# Patient Record
Sex: FEMALE | Race: BLACK OR AFRICAN AMERICAN | Employment: FULL TIME | URBAN - METROPOLITAN AREA
[De-identification: names, ages, dates, MRNs, and addresses within clinical notes are randomized per-mention and may not be internally consistent; named-entity substitution may affect disease eponyms.]

---

## 2017-01-03 ENCOUNTER — OFFICE VISIT (OUTPATIENT)
Dept: OBGYN CLINIC | Age: 42
End: 2017-01-03

## 2017-01-03 VITALS
BODY MASS INDEX: 31.01 KG/M2 | WEIGHT: 175 LBS | SYSTOLIC BLOOD PRESSURE: 136 MMHG | HEART RATE: 78 BPM | HEIGHT: 63 IN | DIASTOLIC BLOOD PRESSURE: 87 MMHG

## 2017-01-03 DIAGNOSIS — D25.1 INTRAMURAL LEIOMYOMA OF UTERUS: Primary | ICD-10-CM

## 2017-01-03 NOTE — MR AVS SNAPSHOT
Visit Information Date & Time Provider Department Dept. Phone Encounter #  
 1/3/2017  2:15 PM Jerome Bone, [facility] [phone] Follow-up Instructions Return in about 1 week (around 1/10/2017) for endometrial biopsy. Your Appointments 2/22/2017  8:20 AM  
[place] [address] [address]) Appt Note: 6 month follow up; Return in 6 months 3rd Hep, breast exam, nurse visit. [address] [facility] 83 [address] [place]  
  
   
 [address] [place] [address] [address] Upcoming Health Maintenance Date Due INFLUENZA AGE 9 TO ADULT 8/1/2016 PAP AKA CERVICAL CYTOLOGY 12/27/2019 Allergies as of 1/3/2017  Review Complete On: 1/3/2017 By: Jerome Bone MD  
 No Known Allergies Current Immunizations  Reviewed on 8/16/2016 Name Date Hep B Vaccine (Adult) 8/22/2016, 7/22/2016 MMR 8/16/2016 TB Skin Test (PPD) Intradermal 7/22/2016 Tdap 7/22/2016 Not reviewed this visit Vitals BP Pulse Height(growth percentile) Weight(growth percentile) LMP BMI  
 136/87 78 5' 3\" (1.6 m) 175 lb (79.4 kg) 12/11/2016 (Exact Date) 31 kg/m2 OB Status Smoking Status Having regular periods Never Smoker BMI and BSA Data Body Mass Index Body Surface Area  
 31 kg/m 2 1.88 m 2 Preferred Pharmacy Pharmacy Name Phone [facility] [address], [address] [place] [address] [phone] Your Updated Medication List  
  
   
This list is accurate as of: 1/3/17  3:58 PM.  Always use your most recent med list.  
  
  
  
  
 multivitamin with iron tablet Take 1 Tab by mouth daily. Follow-up Instructions Return in about 1 week (around 1/10/2017) for endometrial biopsy. Patient Instructions Endometrial Biopsy: About This Test 
What is it? An endometrial biopsy is a way for your doctor to take a small sample of the lining of the uterus (endometrium). The sample is looked at under a microscope for abnormal cells. An endometrial biopsy helps your doctor find problems in the endometrium. Why is this test done? An endometrial biopsy is done to check for cancer of the uterus. The test is also done if you have abnormal bleeding from your uterus or are having problems getting pregnant. The test results show how your body's hormones are affecting the lining of the uterus. How can you prepare for the test? 
Talk to your doctor about all your health conditions before the test. For example, tell your doctor if you: · Are or might be pregnant. An endometrial biopsy is not done during pregnancy. · Are taking any medicines. · Are allergic to any medicines. · Have had bleeding problems, or if you take aspirin or some other blood thinner. · Have been treated for an infection in your pelvic area. · Have any heart or lung problems. Other ways to prepare: · Do not douche, use tampons, or use vaginal medicines for 24 hours before the test. 
· Ask your doctor if you should take a pain reliever, such as ibuprofen (Advil or Motrin), 30 to 60 minutes before the test. This can help reduce any cramping pain that the test can cause. · Talk to your doctor if you have concerns about the need for the test, its risks, how it will be done, or what the results may mean. What happens before the test? 
· You will empty your bladder just before the test. 
· You will be asked to sign a consent form that says you understand the risks of the test and agree to have it done. What happens during the test? 
· You will lie on an exam table. Your feet will be in stirrups. · The doctor may use a spray or injection to numb your cervix. The cervix is the opening to the uterus. · The doctor will use a tool called a speculum to see the cervix. · Then the doctor will pass a thin tube through the cervix to take a sample of the uterus lining. You may feel a sharp cramp when the doctor collects the sample. · The sample is sent to a lab. How long does the test take? The test will take about 5 to 15 minutes. What happens after the test? 
· You will probably be able to go home right away. · You likely will have mild vaginal bleeding and may have cramps for a few days after the test. The cramps may feel like bad menstrual cramps. · Ask your doctor if you can take an over-the-counter pain medicine, such as acetaminophen (Tylenol), ibuprofen (Advil, Motrin), or naproxen (Aleve). Be safe with medicines. Read and follow all instructions on the label. · Do not have sex, use tampons, or douche until the spotting stops. Use pads for vaginal bleeding or discharge. · Do not do strenuous exercise or heavy lifting for one day after your biopsy. Follow-up care is a key part of your treatment and safety. Be sure to make and go to all appointments, and call your doctor if you are having problems. It's also a good idea to keep a list of the medicines you take. Ask your doctor when you can expect to have your test results. Where can you learn more? Go to http://cesia-spenser.info/. Enter [phone] in the search box to learn more about \"Endometrial Biopsy: About This Test.\" Current as of: May 2, 2016 Content Version: 11.1 © 2525-5815 [employer]. Care instructions adapted under license by Sustainatopia.[employer] (which disclaims liability or warranty for this information). If you have questions about a medical condition or this instruction, always ask your healthcare professional. [address] any warranty or liability for your use of this information. Introducing [facility] HEALTH SERVICES! Dear April: 
Thank you for requesting a Formotus account.   Our records indicate that you already have an active ExactCost account. You can access your account anytime at [email or web address] Did you know that you can access your hospital and ER discharge instructions at any time in ExactCost? You can also review all of your test results from your hospital stay or ER visit. Additional Information If you have questions, please visit the Frequently Asked Questions section of the ExactCost website at [email or web address] Remember, ExactCost is NOT to be used for urgent needs. For medical emergencies, dial 911. Now available from your iPhone and Android! Please provide this summary of care documentation to your next provider. Your primary care clinician is listed as Karin Esquivel. If you have any questions after today's visit, please call [phone].

## 2017-01-03 NOTE — PATIENT INSTRUCTIONS
Endometrial Biopsy: About This Test  What is it? An endometrial biopsy is a way for your doctor to take a small sample of the lining of the uterus (endometrium). The sample is looked at under a microscope for abnormal cells. An endometrial biopsy helps your doctor find problems in the endometrium. Why is this test done? An endometrial biopsy is done to check for cancer of the uterus. The test is also done if you have abnormal bleeding from your uterus or are having problems getting pregnant. The test results show how your body's hormones are affecting the lining of the uterus. How can you prepare for the test?  Talk to your doctor about all your health conditions before the test. For example, tell your doctor if you:  · Are or might be pregnant. An endometrial biopsy is not done during pregnancy. · Are taking any medicines. · Are allergic to any medicines. · Have had bleeding problems, or if you take aspirin or some other blood thinner. · Have been treated for an infection in your pelvic area. · Have any heart or lung problems. Other ways to prepare:  · Do not douche, use tampons, or use vaginal medicines for 24 hours before the test.  · Ask your doctor if you should take a pain reliever, such as ibuprofen (Advil or Motrin), 30 to 60 minutes before the test. This can help reduce any cramping pain that the test can cause. · Talk to your doctor if you have concerns about the need for the test, its risks, how it will be done, or what the results may mean. What happens before the test?  · You will empty your bladder just before the test.  · You will be asked to sign a consent form that says you understand the risks of the test and agree to have it done. What happens during the test?  · You will lie on an exam table. Your feet will be in stirrups. · The doctor may use a spray or injection to numb your cervix. The cervix is the opening to the uterus.   · The doctor will use a tool called a speculum to see the cervix. · Then the doctor will pass a thin tube through the cervix to take a sample of the uterus lining. You may feel a sharp cramp when the doctor collects the sample. · The sample is sent to a lab. How long does the test take? The test will take about 5 to 15 minutes. What happens after the test?  · You will probably be able to go home right away. · You likely will have mild vaginal bleeding and may have cramps for a few days after the test. The cramps may feel like bad menstrual cramps. · Ask your doctor if you can take an over-the-counter pain medicine, such as acetaminophen (Tylenol), ibuprofen (Advil, Motrin), or naproxen (Aleve). Be safe with medicines. Read and follow all instructions on the label. · Do not have sex, use tampons, or douche until the spotting stops. Use pads for vaginal bleeding or discharge. · Do not do strenuous exercise or heavy lifting for one day after your biopsy. Follow-up care is a key part of your treatment and safety. Be sure to make and go to all appointments, and call your doctor if you are having problems. It's also a good idea to keep a list of the medicines you take. Ask your doctor when you can expect to have your test results. Where can you learn more? Go to http://cesai-spenser.info/. Enter [phone] in the search box to learn more about \"Endometrial Biopsy: About This Test.\"  Current as of: May 2, 2016  Content Version: 11.1  © 4189-5816 Foodini, Incorporated. Care instructions adapted under license by OwnEnergy (which disclaims liability or warranty for this information). If you have questions about a medical condition or this instruction, always ask your healthcare professional. [address] any warranty or liability for your use of this information.

## 2017-01-03 NOTE — PROGRESS NOTES
40 y/o  presents to the office for results. She has abnormal bleeding with known fibroids. She describes the bleeding as heavy and she soaks pads hourly. She desires definitive management. Visit Vitals    /87    Pulse 78    Ht 5' 3\" (1.6 m)    Wt 175 lb (79.4 kg)    LMP 2016 (Exact Date)    BMI 31 kg/m2     Exam: deferred. Labs: pap normal.    Ultrasound: Findings: A real time sonographic examination of the pelvis was performed. Both  transabdominal and endovaginal images were obtained. The uterus is normal in  size. The uterus measured 12.7 x 6.1 x 7.2 cm. Two uterine masses are  measured. One measures 3.2 x 3.0 x 3.3 cm. The other measures 4.0 x 3.0 x 3.3  cm. Masses are characteristic of uterine leiomyoma. The endometrial stripe was  normal in thickness and measured 11 mm in double wall thickness. The right  ovary has been previously removed. The left ovary measured 3.8 x 4.0 x 2.1 cm. Doppler flow is demonstrated to the left ovary. There was a 1.6 cm follicular  cyst demonstrated. No adnexal masses or fluid collections were present. A/P:  Symptomatic uterine fibroids. Desires surgical managment. Will not need clearance. EMB at next visit. RTO 1 week or not on her cycle.

## 2017-01-17 ENCOUNTER — OFFICE VISIT (OUTPATIENT)
Dept: OBGYN CLINIC | Age: 42
End: 2017-01-17

## 2017-01-17 VITALS
HEIGHT: 63 IN | WEIGHT: 175 LBS | BODY MASS INDEX: 31.01 KG/M2 | DIASTOLIC BLOOD PRESSURE: 76 MMHG | HEART RATE: 84 BPM | SYSTOLIC BLOOD PRESSURE: 123 MMHG

## 2017-01-17 DIAGNOSIS — D25.9 UTERINE LEIOMYOMA, UNSPECIFIED LOCATION: ICD-10-CM

## 2017-01-17 DIAGNOSIS — N92.1 MENORRHAGIA WITH IRREGULAR CYCLE: Primary | ICD-10-CM

## 2017-01-17 NOTE — PROCEDURES
Pre-procedure   Pain: 0:10    Time out: 0940    Endometrial Biopsy Procedure Note    Endometrial biopsy was performed today. Indications: enlarged uterus    Procedure Details   Urine pregnancy test was done and result is negative. The risks (including infection, bleeding, pain, and uterine perforation) and benefits of the procedure were explained to the her and written informed consent was obtained. Antibiotic prophylaxis against endocarditis was not indicated. She was placed in the dorsal lithotomy position. Bimanual exam showed the uterus to be in the anteroflexed position. A speculum inserted in the vagina, and the cervix prepped with povidone iodine. A \"Pipelle endometrial aspirator\" was used to sample the endometrium. Sample was sent for pathologic examination. The patient tolerated the procedure well and there were no complications. Post procedure pain: 5:10    Plan:  Ms. Allison Martínez was advised to call for any fever or for prolonged or severe pain or bleeding. She was advised to use OTC ibuprofen as needed for mild to moderate pain. She was advised to avoid vaginal intercourse for 48 hours or until the bleeding has completely stopped. My office will get in touch with her as soon as we have the pathology report.

## 2017-01-17 NOTE — MR AVS SNAPSHOT
Visit Information Date & Time Provider Department Dept. Phone Encounter #  
 1/17/2017  9:15 AM [phone] [id number] Follow-up Instructions Return in about 8 days (around 1/25/2017) for pre-op exam.  
  
Your Appointments 1/25/2017 10:30 AM  
PRE OP CPE with Rex Deshpande MD  
[address] [place] [address] ([facility]) Appt Note: pre-op [facility] 83 [phone]  
[phone]  
  
   
 [facility] [phone]  
  
    
 2/16/2017 10:40 AM  
Follow Up with Eliel Jimenez DO [address] [place] [address] Appt Note: 6 month follow up; Return in 6 months 3rd Hep, breast exam, nurse visit. ; LM w/ lady to call office to r/s 1/11 de; r/s from 2/22 de  
 [address] [facility] [address] [facility] 83 222 [facility]  
  
   
 [address] [facility] [address] Upcoming Health Maintenance Date Due INFLUENZA AGE 9 TO ADULT 8/1/2016 PAP AKA CERVICAL CYTOLOGY 12/27/2019 Allergies as of 1/17/2017  Review Complete On: 1/3/2017 By: Rex Deshpande MD  
 No Known Allergies Current Immunizations  Reviewed on 8/16/2016 Name Date Hep B Vaccine (Adult) 8/22/2016, 7/22/2016 MMR 8/16/2016 TB Skin Test (PPD) Intradermal 7/22/2016 Tdap 7/22/2016 Not reviewed this visit You Were Diagnosed With   
  
 Codes Comments Menorrhagia with irregular cycle    -  Primary ICD-10-CM: N92.1 ICD-9-CM: 626.2 Uterine leiomyoma, unspecified location     ICD-10-CM: D25.9 ICD-9-CM: 218.9 Vitals BP Pulse Height(growth percentile) Weight(growth percentile) LMP BMI  
 123/76 84 5' 3\" (1.6 m) 175 lb (79.4 kg) 12/11/2016 (Exact Date) 31 kg/m2 OB Status Smoking Status Having regular periods Never Smoker BMI and BSA Data Body Mass Index Body Surface Area  
 31 kg/m 2 1.88 m 2 Preferred Pharmacy Pharmacy Name Phone [facility] [address], [place] [phone] Your Updated Medication List  
  
   
This list is accurate as of: 1/17/17 10:09 AM.  Always use your most recent med list.  
  
  
  
  
 multivitamin with iron tablet Take 1 Tab by mouth daily. Follow-up Instructions Return in about 8 days (around 1/25/2017) for pre-op exam.  
  
  
Patient Instructions Laparoscopically Assisted Vaginal Hysterectomy: Before Your Surgery What is a laparoscopically assisted vaginal hysterectomy? A hysterectomy is surgery to take out the uterus. In some cases, the ovaries and fallopian tubes are taken out at the same time. The doctor makes one or more small cuts in the belly. These cuts are called incisions. They let the doctor insert tools to do the surgery. One of these tools is a tube with a light on it. It's called a laparoscope, or scope. The scope and the other tools allow the doctor to free the uterus. Then the doctor makes a small cut in the vagina. The uterus is taken out through this cut. After the surgery, you will not have periods. You will not be able to get pregnant. If there is a chance that you want to have a baby, talk to your doctor about treatment options. Some women go home the day of surgery and some will stay in the hospital 1 to 2 days after surgery. You will need about 4 to 6 weeks to fully recover. The recovery time may be less for some patients. Follow-up care is a key part of your treatment and safety. Be sure to make and go to all appointments, and call your doctor if you are having problems. It's also a good idea to know your test results and keep a list of the medicines you take. What happens before surgery? Surgery can be stressful. This information will help you understand what you can expect. And it will help you safely prepare for surgery. Preparing for surgery · Understand exactly what surgery is planned, along with the risks, benefits, and other options. · Tell your doctors ALL the medicines, vitamins, supplements, and herbal remedies you take. Some of these can increase the risk of bleeding or interact with anesthesia. · If you take blood thinners, such as warfarin (Coumadin), clopidogrel (Plavix), or aspirin, be sure to talk to your doctor. He or she will tell you if you should stop taking these medicines before your surgery. Make sure that you understand exactly what your doctor wants you to do. · Your doctor will tell you which medicines to take or stop before your surgery. You may need to stop taking certain medicines a week or more before surgery. So talk to your doctor as soon as you can. · If you have an advance directive, let your doctor know. It may include a living will and a durable power of  for health care. Bring a copy to the hospital. If you don't have one, you may want to prepare one. It lets your doctor and loved ones know your health care wishes. Doctors advise that everyone prepare these papers before any type of surgery or procedure. What happens on the day of surgery? · Follow the instructions exactly about when to stop eating and drinking. If you don't, your surgery may be canceled. If your doctor told you to take your medicines on the day of surgery, please take them with only a sip of water. · Take a bath or shower before you come in for your surgery. Do not apply lotions, perfumes, deodorants, or nail polish. · Do not shave the surgical site yourself. · Take off all jewelry and piercings. And take out contact lenses, if you wear them. At the hospital or surgery center · Bring a picture ID. · You will be kept comfortable and safe by your anesthesia provider. You will be asleep during the surgery. · The surgery will take about 2 to 4 hours. Going home · Be sure you have someone to drive you home.  Anesthesia and pain medicine make it unsafe for you to drive. · You will be given more specific instructions about recovering from your surgery. They will cover things like diet, wound care, follow-up care, driving, and getting back to your normal routine. When should you call your doctor? · You have questions or concerns. · You do not understand how to prepare for your surgery. · You become ill before surgery (such as fever, flu, or a cold). · You need to reschedule or have changed your mind about having the surgery. Where can you learn more? Go to http://cesia-spenser.info/. Enter D669 in the search box to learn more about \"Laparoscopically Assisted Vaginal Hysterectomy: Before Your Surgery. \" Current as of: February 25, 2016 Content Version: 11.1 © 8415-3378 Healthwise, Incorporated. Care instructions adapted under license by [employer]tan (which disclaims liability or warranty for this information). If you have questions about a medical condition or this instruction, always ask your healthcare professional. [place] [address] any warranty or liability for your use of this information. Introducing [facility] & HEALTH SERVICES! Dear April: 
Thank you for requesting a GetMaid account. Our records indicate that you already have an active GetMaid account. You can access your account anytime at [email or web address] "[email or web address] [email or web address] Medical Did you know that you can access your hospital and ER discharge instructions at any time in GetMaid? You can also review all of your test results from your hospital stay or ER visit. Additional Information If you have questions, please visit the Frequently Asked Questions section of the GetMaid website at [email or web address] Remember, GetMaid is NOT to be used for urgent needs. For medical emergencies, dial 911. Now available from your iPhone and Android! Please provide this summary of care documentation to your next provider. Your primary care clinician is listed as Jeremie Sheikh. If you have any questions after today's visit, please call [phone].

## 2017-01-17 NOTE — PATIENT INSTRUCTIONS
Laparoscopically Assisted Vaginal Hysterectomy: Before Your Surgery  What is a laparoscopically assisted vaginal hysterectomy? A hysterectomy is surgery to take out the uterus. In some cases, the ovaries and fallopian tubes are taken out at the same time. The doctor makes one or more small cuts in the belly. These cuts are called incisions. They let the doctor insert tools to do the surgery. One of these tools is a tube with a light on it. It's called a laparoscope, or scope. The scope and the other tools allow the doctor to free the uterus. Then the doctor makes a small cut in the vagina. The uterus is taken out through this cut. After the surgery, you will not have periods. You will not be able to get pregnant. If there is a chance that you want to have a baby, talk to your doctor about treatment options. Some women go home the day of surgery and some will stay in the hospital 1 to 2 days after surgery. You will need about 4 to 6 weeks to fully recover. The recovery time may be less for some patients. Follow-up care is a key part of your treatment and safety. Be sure to make and go to all appointments, and call your doctor if you are having problems. It's also a good idea to know your test results and keep a list of the medicines you take. What happens before surgery? Surgery can be stressful. This information will help you understand what you can expect. And it will help you safely prepare for surgery. Preparing for surgery  · Understand exactly what surgery is planned, along with the risks, benefits, and other options. · Tell your doctors ALL the medicines, vitamins, supplements, and herbal remedies you take. Some of these can increase the risk of bleeding or interact with anesthesia. · If you take blood thinners, such as warfarin (Coumadin), clopidogrel (Plavix), or aspirin, be sure to talk to your doctor. He or she will tell you if you should stop taking these medicines before your surgery. Make sure that you understand exactly what your doctor wants you to do. · Your doctor will tell you which medicines to take or stop before your surgery. You may need to stop taking certain medicines a week or more before surgery. So talk to your doctor as soon as you can. · If you have an advance directive, let your doctor know. It may include a living will and a durable power of  for health care. Bring a copy to the hospital. If you don't have one, you may want to prepare one. It lets your doctor and loved ones know your health care wishes. Doctors advise that everyone prepare these papers before any type of surgery or procedure. What happens on the day of surgery? · Follow the instructions exactly about when to stop eating and drinking. If you don't, your surgery may be canceled. If your doctor told you to take your medicines on the day of surgery, please take them with only a sip of water. · Take a bath or shower before you come in for your surgery. Do not apply lotions, perfumes, deodorants, or nail polish. · Do not shave the surgical site yourself. · Take off all jewelry and piercings. And take out contact lenses, if you wear them. At the hospital or surgery center  · Bring a picture ID. · You will be kept comfortable and safe by your anesthesia provider. You will be asleep during the surgery. · The surgery will take about 2 to 4 hours. Going home  · Be sure you have someone to drive you home. Anesthesia and pain medicine make it unsafe for you to drive. · You will be given more specific instructions about recovering from your surgery. They will cover things like diet, wound care, follow-up care, driving, and getting back to your normal routine. When should you call your doctor? · You have questions or concerns. · You do not understand how to prepare for your surgery. · You become ill before surgery (such as fever, flu, or a cold).   · You need to reschedule or have changed your mind about having the surgery. Where can you learn more? Go to http://cesia-spenser.info/. Enter D669 in the search box to learn more about \"Laparoscopically Assisted Vaginal Hysterectomy: Before Your Surgery. \"  Current as of: February 25, 2016  Content Version: 11.1  © 2612-2543 [employer], [employer]. Care instructions adapted under license by [employer] (which disclaims liability or warranty for this information). If you have questions about a medical condition or this instruction, always ask your healthcare professional. [address] any warranty or liability for your use of this information.

## 2017-01-17 NOTE — PROGRESS NOTES
42 y/o  presents to the office for EMB. She has symptomatic fibroids and is being evaluated for hysterectomy. Her cycle was -12. She has no complaints at this time. Visit Vitals    /76    Pulse 84    Ht 5' 3\" (1.6 m)    Wt 175 lb (79.4 kg)    LMP 2016 (Exact Date)    BMI 31 kg/m2     See full procedure note for details of EMB. A/P:  EMB done today. Surgery scheduled for 17.

## 2017-01-19 LAB
DX ICD CODE: NORMAL
PATH REPORT.FINAL DX SPEC: NORMAL
PATH REPORT.GROSS SPEC: NORMAL
PATH REPORT.SITE OF ORIGIN SPEC: NORMAL
PATHOLOGIST NAME: NORMAL
PAYMENT PROCEDURE: NORMAL

## 2017-01-20 ENCOUNTER — OFFICE VISIT (OUTPATIENT)
Dept: OBGYN CLINIC | Age: 42
End: 2017-01-20

## 2017-01-25 ENCOUNTER — OFFICE VISIT (OUTPATIENT)
Dept: OBGYN CLINIC | Age: 42
End: 2017-01-25

## 2017-01-25 VITALS — HEIGHT: 63 IN

## 2017-01-25 DIAGNOSIS — D25.1 INTRAMURAL LEIOMYOMA OF UTERUS: Primary | ICD-10-CM

## 2017-01-25 DIAGNOSIS — N92.0 MENORRHAGIA WITH REGULAR CYCLE: ICD-10-CM

## 2017-01-25 NOTE — PROGRESS NOTES
Preoperative Evaluation    Date of Exam: 2017 FRANSISCA Prado is a 43 y.o. female (:1975) who presents for preoperative evaluation. Procedure/Surgery:Davinci Laparoscopic Hysterectomy/Salpingectomy; Ovarian preservation  Date of Procedure/Surgery: 2017  Surgeon: An Marrero MD  Hospital/Surgical Facility: [facility]  Primary Physician: Kyle Moeller DO  Latex Allergy: no    Problem List:     Patient Active Problem List    Diagnosis Date Noted    Fibroids 2016    H/O excision of dermoid cyst 2016    Anemia 10/13/2014    Menorrhagia 10/03/2014     Medical History:     Past Medical History   Diagnosis Date    Anemia 10/13/2014    Breast lump 08/15/2016     Right braest lump 2 oclock per MD    Fibroids 2016    H/O excision of dermoid cyst 2016     Allergies:   No Known Allergies   Medications:     Current Outpatient Prescriptions   Medication Sig    multivitamin with iron tablet Take 1 Tab by mouth daily. No current facility-administered medications for this visit. Surgical History:     Past Surgical History   Procedure Laterality Date    Hx cyst removal       right ovary    Hx  section  2001    Hx tubal ligation  2001     Social History:     Social History     Social History    Marital status: SINGLE     Spouse name: N/A    Number of children: N/A    Years of education: N/A     Social History Main Topics    Smoking status: Never Smoker    Smokeless tobacco: Never Used    Alcohol use Yes      Comment: occ once a month.      Drug use: No    Sexual activity: Yes     Partners: Male     Birth control/ protection: None     Other Topics Concern    Not on file     Social History Narrative       Recent use of: No recent use of aspirin (ASA), NSAIDS or steroids    Tetanus up to date: tetanus status unknown to the patient      Anesthesia Complications: None  History of abnormal bleeding : None  History of Blood Transfusions: no  Health Care Directive or Living Will: no    REVIEW OF SYSTEMS:  A comprehensive review of systems was negative except for that written in the HPI. EXAM:   Visit Vitals    Ht 5' 3\" (1.6 m)     General appearance - alert, well appearing, and in no distress and normal appearing weight  Mental status - alert, oriented to person, place, and time  Chest - clear to auscultation, no wheezes, rales or rhonchi, symmetric air entry  Heart - normal rate, regular rhythm, normal S1, S2, no murmurs, rubs, clicks or gallops  Abdomen - soft, nontender, nondistended, no masses or organomegaly  Pelvic - 12-14 week sized uterus. Mobile      DIAGNOSTICS:   1. EKG: EKG FINDINGS - pending  2. CXR: not indicated  3. Labs: pending     Ultrasound: 12 x 8x 6 cm multi-fibroid uterus. IMPRESSION:   44 y/o with symptomatic uterine fibroids. Desires definitive management. Pt. Doesn't have any known chronic health problems- labs are pending with EKG. Risks, benefits, and alternatives to this procedure were explained in detail, including risks of infection, damage to surrounding structures, and bleeding. No contraindications to planned surgery pending normal labs.     Jf Giron MD   1/25/2017

## 2017-01-27 ENCOUNTER — HOSPITAL ENCOUNTER (OUTPATIENT)
Dept: PREADMISSION TESTING | Age: 42
Discharge: HOME | End: 2017-01-27
Payer: COMMERCIAL

## 2017-01-27 ENCOUNTER — HOSPITAL ENCOUNTER (OUTPATIENT)
Dept: LAB | Age: 42
Discharge: HOME | End: 2017-01-27
Payer: COMMERCIAL

## 2017-01-27 ENCOUNTER — ANESTHESIA EVENT (OUTPATIENT)
Dept: SURGERY | Age: 42
End: 2017-01-27
Payer: COMMERCIAL

## 2017-01-27 DIAGNOSIS — N92.0 EXCESSIVE OR FREQUENT MENSTRUATION: ICD-10-CM

## 2017-01-27 DIAGNOSIS — D25.9 LEIOMYOMA OF UTERUS, UNSPECIFIED: ICD-10-CM

## 2017-01-27 DIAGNOSIS — Z01.818 PRE-OP TESTING: ICD-10-CM

## 2017-01-27 DIAGNOSIS — D64.9 ANEMIA, UNSPECIFIED: ICD-10-CM

## 2017-01-27 LAB
ALBUMIN SERPL BCP-MCNC: 3.7 G/DL (ref 3.4–5)
ALBUMIN/GLOB SERPL: 0.9 {RATIO} (ref 0.8–1.7)
ALP SERPL-CCNC: 82 U/L (ref 45–117)
ALT SERPL-CCNC: 56 U/L (ref 13–56)
ANION GAP BLD CALC-SCNC: 8 MMOL/L (ref 3–18)
AST SERPL W P-5'-P-CCNC: 17 U/L (ref 15–37)
BILIRUB SERPL-MCNC: 0.2 MG/DL (ref 0.2–1)
BUN SERPL-MCNC: 9 MG/DL (ref 7–18)
BUN/CREAT SERPL: 10 (ref 12–20)
CALCIUM SERPL-MCNC: 8.7 MG/DL (ref 8.5–10.1)
CHLORIDE SERPL-SCNC: 102 MMOL/L (ref 100–108)
CO2 SERPL-SCNC: 28 MMOL/L (ref 21–32)
CREAT SERPL-MCNC: 0.87 MG/DL (ref 0.6–1.3)
ERYTHROCYTE [DISTWIDTH] IN BLOOD BY AUTOMATED COUNT: 13.8 % (ref 11.6–14.5)
GLOBULIN SER CALC-MCNC: 3.9 G/DL (ref 2–4)
GLUCOSE SERPL-MCNC: 110 MG/DL (ref 74–99)
HBA1C MFR BLD: 5.2 % (ref 4.2–5.6)
HCG SERPL-ACNC: <2 MIU/ML (ref 0–10)
HCT VFR BLD AUTO: 38.2 % (ref 35–45)
HGB BLD-MCNC: 12.2 G/DL (ref 12–16)
MCH RBC QN AUTO: 29 PG (ref 24–34)
MCHC RBC AUTO-ENTMCNC: 31.9 G/DL (ref 31–37)
MCV RBC AUTO: 91 FL (ref 74–97)
PLATELET # BLD AUTO: 324 K/UL (ref 135–420)
PMV BLD AUTO: 10.2 FL (ref 9.2–11.8)
POTASSIUM SERPL-SCNC: 4.1 MMOL/L (ref 3.5–5.5)
PROT SERPL-MCNC: 7.6 G/DL (ref 6.4–8.2)
RBC # BLD AUTO: 4.2 M/UL (ref 4.2–5.3)
SODIUM SERPL-SCNC: 138 MMOL/L (ref 136–145)
WBC # BLD AUTO: 5.9 K/UL (ref 4.6–13.2)

## 2017-01-27 PROCEDURE — 83036 HEMOGLOBIN GLYCOSYLATED A1C: CPT | Performed by: OBSTETRICS & GYNECOLOGY

## 2017-01-27 PROCEDURE — 80053 COMPREHEN METABOLIC PANEL: CPT | Performed by: OBSTETRICS & GYNECOLOGY

## 2017-01-27 PROCEDURE — 93005 ELECTROCARDIOGRAM TRACING: CPT

## 2017-01-27 PROCEDURE — 36415 COLL VENOUS BLD VENIPUNCTURE: CPT | Performed by: OBSTETRICS & GYNECOLOGY

## 2017-01-27 PROCEDURE — 84702 CHORIONIC GONADOTROPIN TEST: CPT | Performed by: OBSTETRICS & GYNECOLOGY

## 2017-01-27 PROCEDURE — 85027 COMPLETE CBC AUTOMATED: CPT | Performed by: OBSTETRICS & GYNECOLOGY

## 2017-01-28 LAB
ATRIAL RATE: 73 BPM
CALCULATED P AXIS, ECG09: 17 DEGREES
CALCULATED R AXIS, ECG10: 65 DEGREES
CALCULATED T AXIS, ECG11: 48 DEGREES
DIAGNOSIS, 93000: NORMAL
P-R INTERVAL, ECG05: 128 MS
Q-T INTERVAL, ECG07: 366 MS
QRS DURATION, ECG06: 84 MS
QTC CALCULATION (BEZET), ECG08: 403 MS
VENTRICULAR RATE, ECG03: 73 BPM

## 2017-01-30 ENCOUNTER — HOSPITAL ENCOUNTER (OUTPATIENT)
Age: 42
Discharge: HOME | End: 2017-01-31
Attending: OBSTETRICS & GYNECOLOGY | Admitting: OBSTETRICS & GYNECOLOGY
Payer: COMMERCIAL

## 2017-01-30 ENCOUNTER — ANESTHESIA (OUTPATIENT)
Dept: SURGERY | Age: 42
End: 2017-01-30
Payer: COMMERCIAL

## 2017-01-30 LAB
ABO + RH BLD: NORMAL
BLOOD GROUP ANTIBODIES SERPL: NORMAL
HCG UR QL: NEGATIVE
SPECIMEN EXP DATE BLD: NORMAL

## 2017-01-30 PROCEDURE — 77030010517 HC APPL SEAL FLOSEL BAXT -B: Performed by: OBSTETRICS & GYNECOLOGY

## 2017-01-30 PROCEDURE — 76210000017 HC OR PH I REC 1.5 TO 2 HR: Performed by: OBSTETRICS & GYNECOLOGY

## 2017-01-30 PROCEDURE — 77030016151 HC PROTCTR LNS DFOG COVD -B: Performed by: OBSTETRICS & GYNECOLOGY

## 2017-01-30 PROCEDURE — 77030005520 HC CATH URETH FOL38 BARD -A: Performed by: OBSTETRICS & GYNECOLOGY

## 2017-01-30 PROCEDURE — 77030028268: Performed by: OBSTETRICS & GYNECOLOGY

## 2017-01-30 PROCEDURE — 77030014647 HC SEAL FBRN TISSL BAXT -D: Performed by: OBSTETRICS & GYNECOLOGY

## 2017-01-30 PROCEDURE — 88307 TISSUE EXAM BY PATHOLOGIST: CPT | Performed by: OBSTETRICS & GYNECOLOGY

## 2017-01-30 PROCEDURE — 88311 DECALCIFY TISSUE: CPT | Performed by: OBSTETRICS & GYNECOLOGY

## 2017-01-30 PROCEDURE — 76010000878 HC OR TIME 3 TO 3.5HR INTENSV - TIER 2: Performed by: OBSTETRICS & GYNECOLOGY

## 2017-01-30 PROCEDURE — 77030013079 HC BLNKT BAIR HGGR 3M -A: Performed by: ANESTHESIOLOGY

## 2017-01-30 PROCEDURE — 77030020255 HC SOL INJ LR 1000ML BG

## 2017-01-30 PROCEDURE — 74011250636 HC RX REV CODE- 250/636: Performed by: OBSTETRICS & GYNECOLOGY

## 2017-01-30 PROCEDURE — 77030034850: Performed by: OBSTETRICS & GYNECOLOGY

## 2017-01-30 PROCEDURE — 74011250636 HC RX REV CODE- 250/636

## 2017-01-30 PROCEDURE — 74011250636 HC RX REV CODE- 250/636: Performed by: NURSE ANESTHETIST, CERTIFIED REGISTERED

## 2017-01-30 PROCEDURE — 77030010545: Performed by: OBSTETRICS & GYNECOLOGY

## 2017-01-30 PROCEDURE — 77030018842 HC SOL IRR SOD CL 9% BAXT -A: Performed by: OBSTETRICS & GYNECOLOGY

## 2017-01-30 PROCEDURE — 86900 BLOOD TYPING SEROLOGIC ABO: CPT | Performed by: NURSE ANESTHETIST, CERTIFIED REGISTERED

## 2017-01-30 PROCEDURE — 77030035048 HC TRCR ENDOSC OPTCL COVD -B: Performed by: OBSTETRICS & GYNECOLOGY

## 2017-01-30 PROCEDURE — 77030021678 HC GLIDESCP STAT DISP VERT -B: Performed by: ANESTHESIOLOGY

## 2017-01-30 PROCEDURE — 77030029357 HC DEV CLSR FAC SYS EFX TELE -C: Performed by: OBSTETRICS & GYNECOLOGY

## 2017-01-30 PROCEDURE — 74011250637 HC RX REV CODE- 250/637: Performed by: NURSE ANESTHETIST, CERTIFIED REGISTERED

## 2017-01-30 PROCEDURE — C1727 CATH, BAL TIS DIS, NON-VAS: HCPCS | Performed by: OBSTETRICS & GYNECOLOGY

## 2017-01-30 PROCEDURE — 36415 COLL VENOUS BLD VENIPUNCTURE: CPT | Performed by: NURSE ANESTHETIST, CERTIFIED REGISTERED

## 2017-01-30 PROCEDURE — 77030035277 HC OBTRTR BLDELSS DISP INTU -B: Performed by: OBSTETRICS & GYNECOLOGY

## 2017-01-30 PROCEDURE — 77030032490 HC SLV COMPR SCD KNE COVD -B: Performed by: OBSTETRICS & GYNECOLOGY

## 2017-01-30 PROCEDURE — 77030002986 HC SUT PROL J&J -A: Performed by: OBSTETRICS & GYNECOLOGY

## 2017-01-30 PROCEDURE — 77030008518 HC TBNG INSUF ENDO STRY -B: Performed by: OBSTETRICS & GYNECOLOGY

## 2017-01-30 PROCEDURE — 77030008683 HC TU ET CUF COVD -A: Performed by: ANESTHESIOLOGY

## 2017-01-30 PROCEDURE — 77030003028 HC SUT VCRL J&J -A: Performed by: OBSTETRICS & GYNECOLOGY

## 2017-01-30 PROCEDURE — 77030011640 HC PAD GRND REM COVD -A: Performed by: OBSTETRICS & GYNECOLOGY

## 2017-01-30 PROCEDURE — 88312 SPECIAL STAINS GROUP 1: CPT | Performed by: OBSTETRICS & GYNECOLOGY

## 2017-01-30 PROCEDURE — 76060000037 HC ANESTHESIA 3 TO 3.5 HR: Performed by: OBSTETRICS & GYNECOLOGY

## 2017-01-30 PROCEDURE — 81025 URINE PREGNANCY TEST: CPT

## 2017-01-30 PROCEDURE — 74011000250 HC RX REV CODE- 250

## 2017-01-30 PROCEDURE — 77030022704 HC SUT VLOC COVD -B: Performed by: OBSTETRICS & GYNECOLOGY

## 2017-01-30 PROCEDURE — 74011000250 HC RX REV CODE- 250: Performed by: OBSTETRICS & GYNECOLOGY

## 2017-01-30 PROCEDURE — 77030027491 HC SHR ENDOSC COVD -B: Performed by: OBSTETRICS & GYNECOLOGY

## 2017-01-30 PROCEDURE — 77030010507 HC ADH SKN DERMBND J&J -B: Performed by: OBSTETRICS & GYNECOLOGY

## 2017-01-30 PROCEDURE — 77030002933 HC SUT MCRYL J&J -A: Performed by: OBSTETRICS & GYNECOLOGY

## 2017-01-30 PROCEDURE — 77030018778 HC MANIP UTER VCAR CNMD -B: Performed by: OBSTETRICS & GYNECOLOGY

## 2017-01-30 PROCEDURE — 77030035044 HC TRCR ENDOSC VRSPRT BLDLSS COVD -C: Performed by: OBSTETRICS & GYNECOLOGY

## 2017-01-30 PROCEDURE — 77030008771 HC TU NG SALEM SUMP -A: Performed by: ANESTHESIOLOGY

## 2017-01-30 PROCEDURE — 74011000272 HC RX REV CODE- 272: Performed by: OBSTETRICS & GYNECOLOGY

## 2017-01-30 RX ORDER — ONDANSETRON 2 MG/ML
INJECTION INTRAMUSCULAR; INTRAVENOUS AS NEEDED
Status: DISCONTINUED | OUTPATIENT
Start: 2017-01-30 | End: 2017-01-30 | Stop reason: HOSPADM

## 2017-01-30 RX ORDER — FENTANYL CITRATE 50 UG/ML
INJECTION, SOLUTION INTRAMUSCULAR; INTRAVENOUS AS NEEDED
Status: DISCONTINUED | OUTPATIENT
Start: 2017-01-30 | End: 2017-01-30 | Stop reason: HOSPADM

## 2017-01-30 RX ORDER — ONDANSETRON 2 MG/ML
4 INJECTION INTRAMUSCULAR; INTRAVENOUS ONCE
Status: COMPLETED | OUTPATIENT
Start: 2017-01-30 | End: 2017-01-30

## 2017-01-30 RX ORDER — SODIUM CHLORIDE 0.9 % (FLUSH) 0.9 %
5-10 SYRINGE (ML) INJECTION EVERY 8 HOURS
Status: DISCONTINUED | OUTPATIENT
Start: 2017-01-30 | End: 2017-01-31 | Stop reason: HOSPADM

## 2017-01-30 RX ORDER — MIDAZOLAM HYDROCHLORIDE 1 MG/ML
INJECTION, SOLUTION INTRAMUSCULAR; INTRAVENOUS AS NEEDED
Status: DISCONTINUED | OUTPATIENT
Start: 2017-01-30 | End: 2017-01-30 | Stop reason: HOSPADM

## 2017-01-30 RX ORDER — FAMOTIDINE 20 MG/1
TABLET, FILM COATED ORAL
Status: DISPENSED
Start: 2017-01-30 | End: 2017-01-30

## 2017-01-30 RX ORDER — CEFAZOLIN SODIUM 2 G/50ML
2 SOLUTION INTRAVENOUS
Status: DISCONTINUED | OUTPATIENT
Start: 2017-01-30 | End: 2017-01-30 | Stop reason: HOSPADM

## 2017-01-30 RX ORDER — KETOROLAC TROMETHAMINE 30 MG/ML
30 INJECTION, SOLUTION INTRAMUSCULAR; INTRAVENOUS
Status: ACTIVE | OUTPATIENT
Start: 2017-01-30 | End: 2017-01-31

## 2017-01-30 RX ORDER — DEXAMETHASONE SODIUM PHOSPHATE 4 MG/ML
INJECTION, SOLUTION INTRA-ARTICULAR; INTRALESIONAL; INTRAMUSCULAR; INTRAVENOUS; SOFT TISSUE AS NEEDED
Status: DISCONTINUED | OUTPATIENT
Start: 2017-01-30 | End: 2017-01-30 | Stop reason: HOSPADM

## 2017-01-30 RX ORDER — SODIUM CHLORIDE, SODIUM LACTATE, POTASSIUM CHLORIDE, CALCIUM CHLORIDE 600; 310; 30; 20 MG/100ML; MG/100ML; MG/100ML; MG/100ML
125 INJECTION, SOLUTION INTRAVENOUS CONTINUOUS
Status: DISCONTINUED | OUTPATIENT
Start: 2017-01-30 | End: 2017-01-30 | Stop reason: HOSPADM

## 2017-01-30 RX ORDER — SODIUM CHLORIDE, SODIUM LACTATE, POTASSIUM CHLORIDE, CALCIUM CHLORIDE 600; 310; 30; 20 MG/100ML; MG/100ML; MG/100ML; MG/100ML
125 INJECTION, SOLUTION INTRAVENOUS CONTINUOUS
Status: DISCONTINUED | OUTPATIENT
Start: 2017-01-30 | End: 2017-01-31 | Stop reason: HOSPADM

## 2017-01-30 RX ORDER — HYDROMORPHONE HYDROCHLORIDE 1 MG/ML
INJECTION, SOLUTION INTRAMUSCULAR; INTRAVENOUS; SUBCUTANEOUS AS NEEDED
Status: DISCONTINUED | OUTPATIENT
Start: 2017-01-30 | End: 2017-01-30 | Stop reason: HOSPADM

## 2017-01-30 RX ORDER — HYDROCODONE BITARTRATE AND ACETAMINOPHEN 5; 325 MG/1; MG/1
1 TABLET ORAL
Status: DISCONTINUED | OUTPATIENT
Start: 2017-01-30 | End: 2017-01-31 | Stop reason: HOSPADM

## 2017-01-30 RX ORDER — PROPOFOL 10 MG/ML
INJECTION, EMULSION INTRAVENOUS AS NEEDED
Status: DISCONTINUED | OUTPATIENT
Start: 2017-01-30 | End: 2017-01-30 | Stop reason: HOSPADM

## 2017-01-30 RX ORDER — KETOROLAC TROMETHAMINE 30 MG/ML
INJECTION, SOLUTION INTRAMUSCULAR; INTRAVENOUS AS NEEDED
Status: DISCONTINUED | OUTPATIENT
Start: 2017-01-30 | End: 2017-01-30 | Stop reason: HOSPADM

## 2017-01-30 RX ORDER — LIDOCAINE HYDROCHLORIDE 20 MG/ML
INJECTION, SOLUTION EPIDURAL; INFILTRATION; INTRACAUDAL; PERINEURAL AS NEEDED
Status: DISCONTINUED | OUTPATIENT
Start: 2017-01-30 | End: 2017-01-30 | Stop reason: HOSPADM

## 2017-01-30 RX ORDER — HYDROMORPHONE HYDROCHLORIDE 1 MG/ML
1 INJECTION, SOLUTION INTRAMUSCULAR; INTRAVENOUS; SUBCUTANEOUS
Status: DISCONTINUED | OUTPATIENT
Start: 2017-01-30 | End: 2017-01-31 | Stop reason: HOSPADM

## 2017-01-30 RX ORDER — BUPIVACAINE HYDROCHLORIDE AND EPINEPHRINE 5; 5 MG/ML; UG/ML
INJECTION, SOLUTION EPIDURAL; INTRACAUDAL; PERINEURAL AS NEEDED
Status: DISCONTINUED | OUTPATIENT
Start: 2017-01-30 | End: 2017-01-30 | Stop reason: HOSPADM

## 2017-01-30 RX ORDER — ROCURONIUM BROMIDE 10 MG/ML
INJECTION, SOLUTION INTRAVENOUS AS NEEDED
Status: DISCONTINUED | OUTPATIENT
Start: 2017-01-30 | End: 2017-01-30 | Stop reason: HOSPADM

## 2017-01-30 RX ORDER — GLYCOPYRROLATE 0.2 MG/ML
INJECTION INTRAMUSCULAR; INTRAVENOUS AS NEEDED
Status: DISCONTINUED | OUTPATIENT
Start: 2017-01-30 | End: 2017-01-30 | Stop reason: HOSPADM

## 2017-01-30 RX ORDER — ONDANSETRON 2 MG/ML
4 INJECTION INTRAMUSCULAR; INTRAVENOUS
Status: DISCONTINUED | OUTPATIENT
Start: 2017-01-30 | End: 2017-01-31 | Stop reason: HOSPADM

## 2017-01-30 RX ORDER — SCOLOPAMINE TRANSDERMAL SYSTEM 1 MG/1
1.5 PATCH, EXTENDED RELEASE TRANSDERMAL
Status: DISCONTINUED | OUTPATIENT
Start: 2017-01-30 | End: 2017-01-31 | Stop reason: HOSPADM

## 2017-01-30 RX ORDER — NEOSTIGMINE METHYLSULFATE 5 MG/5 ML
SYRINGE (ML) INTRAVENOUS AS NEEDED
Status: DISCONTINUED | OUTPATIENT
Start: 2017-01-30 | End: 2017-01-30 | Stop reason: HOSPADM

## 2017-01-30 RX ORDER — FAMOTIDINE 20 MG/1
20 TABLET, FILM COATED ORAL ONCE
Status: COMPLETED | OUTPATIENT
Start: 2017-01-31 | End: 2017-01-30

## 2017-01-30 RX ORDER — SUCCINYLCHOLINE CHLORIDE 20 MG/ML
INJECTION INTRAMUSCULAR; INTRAVENOUS AS NEEDED
Status: DISCONTINUED | OUTPATIENT
Start: 2017-01-30 | End: 2017-01-30 | Stop reason: HOSPADM

## 2017-01-30 RX ORDER — HYDROMORPHONE HYDROCHLORIDE 2 MG/ML
0.5 INJECTION, SOLUTION INTRAMUSCULAR; INTRAVENOUS; SUBCUTANEOUS
Status: DISCONTINUED | OUTPATIENT
Start: 2017-01-30 | End: 2017-01-30 | Stop reason: HOSPADM

## 2017-01-30 RX ORDER — FENTANYL CITRATE 50 UG/ML
50 INJECTION, SOLUTION INTRAMUSCULAR; INTRAVENOUS AS NEEDED
Status: DISCONTINUED | OUTPATIENT
Start: 2017-01-30 | End: 2017-01-30 | Stop reason: HOSPADM

## 2017-01-30 RX ORDER — SODIUM CHLORIDE 0.9 % (FLUSH) 0.9 %
5-10 SYRINGE (ML) INJECTION AS NEEDED
Status: DISCONTINUED | OUTPATIENT
Start: 2017-01-30 | End: 2017-01-31 | Stop reason: HOSPADM

## 2017-01-30 RX ORDER — SODIUM CHLORIDE, SODIUM LACTATE, POTASSIUM CHLORIDE, CALCIUM CHLORIDE 600; 310; 30; 20 MG/100ML; MG/100ML; MG/100ML; MG/100ML
INJECTION, SOLUTION INTRAVENOUS
Status: DISCONTINUED | OUTPATIENT
Start: 2017-01-30 | End: 2017-01-30 | Stop reason: HOSPADM

## 2017-01-30 RX ORDER — SODIUM CHLORIDE, SODIUM LACTATE, POTASSIUM CHLORIDE, CALCIUM CHLORIDE 600; 310; 30; 20 MG/100ML; MG/100ML; MG/100ML; MG/100ML
50 INJECTION, SOLUTION INTRAVENOUS CONTINUOUS
Status: DISCONTINUED | OUTPATIENT
Start: 2017-01-31 | End: 2017-01-30 | Stop reason: HOSPADM

## 2017-01-30 RX ADMIN — HYDROMORPHONE HYDROCHLORIDE 0.4 MG: 1 INJECTION, SOLUTION INTRAMUSCULAR; INTRAVENOUS; SUBCUTANEOUS at 12:03

## 2017-01-30 RX ADMIN — ROCURONIUM BROMIDE 10 MG: 10 INJECTION, SOLUTION INTRAVENOUS at 12:53

## 2017-01-30 RX ADMIN — FENTANYL CITRATE 50 MCG: 50 INJECTION, SOLUTION INTRAMUSCULAR; INTRAVENOUS at 12:06

## 2017-01-30 RX ADMIN — MIDAZOLAM HYDROCHLORIDE 2 MG: 1 INJECTION, SOLUTION INTRAMUSCULAR; INTRAVENOUS at 11:18

## 2017-01-30 RX ADMIN — ONDANSETRON 4 MG: 2 INJECTION INTRAMUSCULAR; INTRAVENOUS at 19:15

## 2017-01-30 RX ADMIN — SODIUM CHLORIDE, SODIUM LACTATE, POTASSIUM CHLORIDE, AND CALCIUM CHLORIDE 50 ML/HR: 600; 310; 30; 20 INJECTION, SOLUTION INTRAVENOUS at 11:10

## 2017-01-30 RX ADMIN — HYDROMORPHONE HYDROCHLORIDE 0.6 MG: 1 INJECTION, SOLUTION INTRAMUSCULAR; INTRAVENOUS; SUBCUTANEOUS at 12:36

## 2017-01-30 RX ADMIN — DEXAMETHASONE SODIUM PHOSPHATE 4 MG: 4 INJECTION, SOLUTION INTRA-ARTICULAR; INTRALESIONAL; INTRAMUSCULAR; INTRAVENOUS; SOFT TISSUE at 11:51

## 2017-01-30 RX ADMIN — FENTANYL CITRATE 50 MCG: 50 INJECTION, SOLUTION INTRAMUSCULAR; INTRAVENOUS at 13:49

## 2017-01-30 RX ADMIN — SODIUM CHLORIDE, SODIUM LACTATE, POTASSIUM CHLORIDE, CALCIUM CHLORIDE: 600; 310; 30; 20 INJECTION, SOLUTION INTRAVENOUS at 11:40

## 2017-01-30 RX ADMIN — FENTANYL CITRATE 50 MCG: 50 INJECTION, SOLUTION INTRAMUSCULAR; INTRAVENOUS at 12:36

## 2017-01-30 RX ADMIN — HYDROMORPHONE HYDROCHLORIDE 0.5 MG: 2 INJECTION INTRAMUSCULAR; INTRAVENOUS; SUBCUTANEOUS at 15:38

## 2017-01-30 RX ADMIN — ONDANSETRON 4 MG: 2 INJECTION INTRAMUSCULAR; INTRAVENOUS at 13:42

## 2017-01-30 RX ADMIN — Medication 3 MG: at 14:08

## 2017-01-30 RX ADMIN — HYDROMORPHONE HYDROCHLORIDE 1 MG: 1 INJECTION, SOLUTION INTRAMUSCULAR; INTRAVENOUS; SUBCUTANEOUS at 19:14

## 2017-01-30 RX ADMIN — ROCURONIUM BROMIDE 5 MG: 10 INJECTION, SOLUTION INTRAVENOUS at 11:25

## 2017-01-30 RX ADMIN — FAMOTIDINE 20 MG: 20 TABLET ORAL at 10:59

## 2017-01-30 RX ADMIN — FENTANYL CITRATE 50 MCG: 50 INJECTION, SOLUTION INTRAMUSCULAR; INTRAVENOUS at 13:06

## 2017-01-30 RX ADMIN — HYDROMORPHONE HYDROCHLORIDE 1 MG: 1 INJECTION, SOLUTION INTRAMUSCULAR; INTRAVENOUS; SUBCUTANEOUS at 23:31

## 2017-01-30 RX ADMIN — ROCURONIUM BROMIDE 25 MG: 10 INJECTION, SOLUTION INTRAVENOUS at 11:30

## 2017-01-30 RX ADMIN — SODIUM CHLORIDE, SODIUM LACTATE, POTASSIUM CHLORIDE, AND CALCIUM CHLORIDE 125 ML/HR: 600; 310; 30; 20 INJECTION, SOLUTION INTRAVENOUS at 18:15

## 2017-01-30 RX ADMIN — ONDANSETRON 4 MG: 2 INJECTION INTRAMUSCULAR; INTRAVENOUS at 15:03

## 2017-01-30 RX ADMIN — ROCURONIUM BROMIDE 10 MG: 10 INJECTION, SOLUTION INTRAVENOUS at 12:25

## 2017-01-30 RX ADMIN — ROCURONIUM BROMIDE 10 MG: 10 INJECTION, SOLUTION INTRAVENOUS at 11:40

## 2017-01-30 RX ADMIN — FENTANYL CITRATE 50 MCG: 50 INJECTION, SOLUTION INTRAMUSCULAR; INTRAVENOUS at 11:47

## 2017-01-30 RX ADMIN — KETOROLAC TROMETHAMINE 4 MG: 30 INJECTION, SOLUTION INTRAMUSCULAR; INTRAVENOUS at 14:06

## 2017-01-30 RX ADMIN — SUCCINYLCHOLINE CHLORIDE 100 MG: 20 INJECTION INTRAMUSCULAR; INTRAVENOUS at 11:25

## 2017-01-30 RX ADMIN — GLYCOPYRROLATE 0.4 MG: 0.2 INJECTION INTRAMUSCULAR; INTRAVENOUS at 14:08

## 2017-01-30 RX ADMIN — FENTANYL CITRATE 100 MCG: 50 INJECTION, SOLUTION INTRAMUSCULAR; INTRAVENOUS at 11:25

## 2017-01-30 RX ADMIN — LIDOCAINE HYDROCHLORIDE 50 MG: 20 INJECTION, SOLUTION EPIDURAL; INFILTRATION; INTRACAUDAL; PERINEURAL at 11:25

## 2017-01-30 RX ADMIN — Medication 10 ML: at 21:15

## 2017-01-30 RX ADMIN — PROPOFOL 150 MG: 10 INJECTION, EMULSION INTRAVENOUS at 11:25

## 2017-01-30 NOTE — PROGRESS NOTES
conducted an initial consultation and Spiritual Assessment for Kanwal Yen, who is a 43 y.o.,female. Patients Primary Language is: Rom Santos. According to the patients EMR [employer] Affiliation is: Karson Gauthier. The reason the Patient came to the hospital is:   Patient Active Problem List    Diagnosis Date Noted    Fibroids 12/20/2016    S/P laparoscopic assisted vaginal hysterectomy (LAVH) 12/20/2016    Anemia 10/13/2014    Menorrhagia 10/03/2014        The  provided the following Interventions:  Initiated a relationship of care and support. Explored issues of aye, belief, spirituality and Orthodox/ritual needs while hospitalized. Listened empathically. Provided information about Spiritual Care Services. Offered prayer and assurance of continued prayers on patient's behalf. Chart reviewed. The following outcomes were achieved:  Patient shared limited information about both their medical narrative and spiritual journey/beliefs. Patient processed feeling about current hospitalization. Patient expressed gratitude for 's visit. Assessment:  Patient does not have any Orthodox/cultural needs that will affect patients preferences in health care. There are no further spiritual or Orthodox issues which require intervention at this time. Plan:  Chaplains will continue to follow and will provide pastoral care on an as needed/requested basis.  recommends bedside caregivers page  on duty if patient shows signs of acute spiritual or emotional distress. Laura Chavez M.Div.   [facility] [address]  [phone]

## 2017-01-30 NOTE — OP NOTES
OPERATIVE REPORT    Preop Diagnosis:   Patient Active Problem List   Diagnosis Code    Menorrhagia N92.0    Anemia D64.9    Fibroids D25.9    H/O excision of dermoid cyst Z98.890, Z86.018       Postop Diagnosis: Same  Procedure: 1. Robotic Assisted Total Laparoscopic Hysterectomy. 2.  Bilateral salpingectomy. 3.  Observational Cystoscopy. Surgeon: Corina Saha MD  Assistant: Harjit Schultz SA  Anesthesia: GETA  EBL: 50 mL  UO:  200 mL  IVF:  1200 mL  Indication:  Ms. Kanwal Beck is a 43 y. o. BLACK OR  with history of   Patient Active Problem List   Diagnosis Code    Menorrhagia N92.0    Anemia D64.9    Fibroids D25.9    H/O excision of dermoid cyst Z98.890, Z86.018     After considering all her therapeutic options, she opted to proceed with the above-mentioned procedure. Risks, benefits, and alternatives were reviewed preoperatively. Findings:  12 wk size, multifibroid, bulky uterus. Normal appearing cervix. Moderate left uterine/bladder peritoneum adhesions noted requiring lysis of adhesions. Left ovary adherent to posterior right lower uterine segment. Right ovary previously removed. Noted left fallopian tube ligation. Paratubal cysts on right. Intact bladder and normal right ureteral efflux of urine. Sluggish left ureteral jets noted. Specimens: Fibroid uterus and fallopian tube to permanent pathology. Intra-op consult done with Dr. Linda Cruz who was not worried about the sluggish left ureteral jet. Procedure: Ms. Kanwal Beck was brought to the operating room where patient and surgery identification were completed with the patient and the OR team. She was placed in dorsal supine position and general anesthesia was administered. Both arms were padded and tucked. SCD's were on and activated. She was placed in what was felt to be neurologically safe lithotomy position in yellow fin stirrups and prepped in the normal sterile fashion. Exam under anesthesia was performed. Indwelling solis catheter was inserted into the bladder. A speculum was placed vaginally and the anterior lip of the cervix was grasped with a single toothed tenaculum. The cervix was serially dilated and the uterus sounded to [address] [place]. An appropriately sized uterine manipulator was selected and inserted without difficulty. Once secured in place, all other instruments were removed. Gloves were changed. Attention was turned to the abdomen. Entry to the abdomen was via optiview utilizing a 10 mm umbilical incision. The abdomen insufflated. The lateral trocars were inserted under direct vision. Inspection of the abdomen and pelvis showed findings as above. The patient was placed in appropriate amount of [place] to allow retraction of the bowels from the operative field. The vLexi robotic system was then docked without difficulty. Hysterectomy proceeded in the usual manner using the surgeon's console. The pelvis was examined with the above noted findings. The course of the ureters was identified. The left fallopian tube, uteroovarian ligament and round ligament were cautarized and transected. There were moderated adhesions of the left lower uterine segment that required lysis for approximately 30 minutes. The posterior and anterior broad ligament was serially dissected toward the colpotomizer cup. The uterine vessels were skeletonized. The bladder flap was carefully created. There was also moderate adhesive disease at the lower uterine segment. The bladder was back filled to locate the margins. The uterine vessels were serially cautarized and transected. The same steps performed contralaterally. Colpotomy was initiated posteriory and carried circumferentially until completion. The specimen was removed vaginally without difficulty. Vaginal cuff closure was completed using V-loc suture. The left ureter was 1 cm distal to the vaginal cuff. Careful sutures were placed.   The pelvis was copiously irrigated and suctioned. Floseal was used at the margin of the bladder and cuff. Hemostasis was noted under low pressure. Observational cystoscopy was performed revealing intact bladder and normal ureteral efflux of urine on the right. The left was again noted to be sluggish. The umbilical incision closed using endoclosure device. The incisions were closed with subcuticular 3-0 Monocryl suture and dermabond applied. Sponge, lap, needle and instrument counts were correct x 2. The patient was transferred to recovery extubated and in stable condition.    Alveta Blizzard, MD  January 30, 2017

## 2017-01-30 NOTE — PROGRESS NOTES
Paged Dr. Ellis Gruber. Received telephone orders for LR at 125mL/hr for Dr. Ellis Gruber. Updated on pt's nausea and what is being done for it. 1935 Pt states that she is not nauseous and would like to eat. Changed diet to regular.

## 2017-01-30 NOTE — ANESTHESIA POSTPROCEDURE EVALUATION
Post-Anesthesia Evaluation and Assessment    Patient: April C Paulino Rodriguez MRN: [id number]  SSN: [id number]    YOB: 1975  Age: 43 y.o. Sex: female      Data from PACU flowsheet    Cardiovascular Function/Vital Signs  Visit Vitals    /62    Pulse (!) 102    Temp 36.4 °C (97.5 °F)    Resp 18    Ht 5' 2\" (1.575 m)    Wt 78 kg (172 lb)    SpO2 100%    BMI 31.46 kg/m2       Patient is status post general anesthesia for Procedure(s):  davinci total laparoscopic HYSTERECTOMY, left salpingectomy observational cystoscopy ROBOTIC ASSISTED. Nausea/Vomiting: controlled    Postoperative hydration reviewed and adequate. Pain:  Pain Scale 1: Numeric (0 - 10) (01/30/17 1107)  Pain Intensity 1: 0 (01/30/17 1107)   Managed      Mental Status and Level of Consciousness: Alert and oriented     Pulmonary Status:   O2 Device: Oxygen mask (01/30/17 1424)   Adequate oxygenation and airway patent    Complications related to anesthesia: None    Post-anesthesia assessment completed.  No concerns    Signed By: Liliya Lundy MD     January 30, 2017

## 2017-01-30 NOTE — PROGRESS NOTES
Pt arrived to room 2219 on stretcher. IVF running,Gutierrez's present,incision c/d/i,SCDs ordered,IV site c/d/i,explain call bell,white board filled out,bed in low position. 1700 Skin assessment done with MOLLY Prieto. Skin is intact,no drainage. 1800 Family at bedside,having nausea and pain,explain her when she can have her next pain and nausea med. 1900 Pain and nausea med given,pt resting in bed. Bedside and Verbal shift change report given to MOLLY Bhatt (oncoming nurse) by Markell Gray (offgoing nurse). Report included the following information SBAR, Kardex, Intake/Output, MAR and Recent Results.

## 2017-01-30 NOTE — IP AVS SNAPSHOT
Doe Becker 
 
 
 [address] [place] [address] Patient: Eliane Saucedo MRN: [id number] XXD:[id number]/23/[id number] You are allergic to the following No active allergies Recent Documentation Height Weight Breastfeeding? BMI OB Status Smoking Status 1.575 m 78 kg No 31.46 kg/m2 Having regular periods Never Smoker Emergency Contacts Name Discharge Info Relation Home Work [employer] [facility] DISCHARGE CAREGIVER [3] Son [22] [phone] About your hospitalization You were admitted on:  January 30, 2017 You last received care in the:  [address] [place] [address],[address] You were discharged on:  January 31, 2017 Unit phone number:  [phone] Why you were hospitalized Your primary diagnosis was:  Not on File Providers Seen During Your Hospitalizations Provider Role Specialty Primary office phone Alveta Blizzard, MD Attending Provider Obstetrics & Gynecology [phone] Your Primary Care Physician (PCP) Primary Care Physician Office Phone Office Fax [facility] [phone] Follow-up Information Follow up With Details Comments Contact Info Belle Saleh DO   [id number] [place] [address] [address] [address] [address] [address] [phone] Your Appointments Thursday February 16, 2017 10:40 AM EST Follow Up with Belle Saleh DO [address] [place] [address]) [id number] [place] [address] [address] [facility] [phone] Current Discharge Medication List  
  
START taking these medications Dose & Instructions Dispensing Information Comments Morning Noon Evening Bedtime HYDROcodone-acetaminophen 5-325 mg per tablet Commonly known as:  Opal Lisbeth Your next dose is: Today, Tomorrow Other:  _________ Dose:  1 Tab Take 1 Tab by mouth every four (4) hours as needed. Max Daily Amount: 6 Tabs. Indications: PAIN Quantity:  40 Tab Refills:  0  
     
   
   
   
  
 ibuprofen 800 mg tablet Commonly known as:  MOTRIN Your next dose is: Today, Tomorrow Other:  _________ Dose:  800 mg Take 1 Tab by mouth every eight (8) hours as needed for Pain. Indications: PAIN Quantity:  30 Tab Refills:  0  
     
   
   
   
  
 simethicone 80 mg chewable tablet Commonly known as:  Duard Lessen Your next dose is: Today, Tomorrow Other:  _________ Dose:  80 mg Take 1 Tab by mouth every six (6) hours as needed for Flatulence. Indications: FLATULENCE Quantity:  40 Tab Refills:  1 CONTINUE these medications which have NOT CHANGED Dose & Instructions Dispensing Information Comments Morning Noon Evening Bedtime  
 multivitamin with iron tablet Your next dose is: Today, Tomorrow Other:  _________ Dose:  1 Tab Take 1 Tab by mouth daily. Refills:  0 Where to Get Your Medications These medications were sent to [facility], [address] [place] [address]  [address] [facility], [address] [place] [address] Hours:  24-hours Phone:  [phone]  
  ibuprofen 800 mg tablet  
 simethicone 80 mg chewable tablet Information on where to get these meds will be given to you by the nurse or doctor. ! Ask your nurse or doctor about these medications HYDROcodone-acetaminophen 5-325 mg per tablet Discharge Instructions DISCHARGE SUMMARY from Nurse The following personal items are in your possession at time of discharge: 
 
Dental Appliances: None Visual Aid: None Home Medications: None Jewelry: None Clothing: None Other Valuables: None PATIENT INSTRUCTIONS: 
 
 
F-face looks uneven A-arms unable to move or move unevenly S-speech slurred or non-existent T-time-call 911 as soon as signs and symptoms begin-DO NOT go Back to bed or wait to see if you get better-TIME IS BRAIN. Warning Signs of HEART ATTACK Call 911 if you have these symptoms: 
? Chest discomfort. Most heart attacks involve discomfort in the center of the chest that lasts more than a few minutes, or that goes away and comes back. It can feel like uncomfortable pressure, squeezing, fullness, or pain. ? Discomfort in other areas of the upper body. Symptoms can include pain or discomfort in one or both arms, the back, neck, jaw, or stomach. ? Shortness of breath with or without chest discomfort. ? Other signs may include breaking out in a cold sweat, nausea, or lightheadedness. Don't wait more than five minutes to call [address]! Fast action can save your life. Calling 911 is almost always the fastest way to get lifesaving treatment. Emergency Medical Services staff can begin treatment when they arrive  up to an hour sooner than if someone gets to the hospital by car. Patient armband removed and shredded MyChart Activation Thank you for requesting access to Weiju. Please follow the instructions below to securely access and download your online medical record. Weiju allows you to send messages to your doctor, view your test results, renew your prescriptions, schedule appointments, and more. How Do I Sign Up? 1. In your internet browser, go to [email or web address] 
2. Click on the First Time User? Click Here link in the Sign In box. You will be redirect to the New Member Sign Up page. 3. Enter your Eduorat Access Code exactly as it appears below. You will not need to use this code after youve completed the sign-up process. If you do not sign up before the expiration date, you must request a new code. Antrad Medicalhart Access Code: Activation code not generated Current Breezie Status: Active (This is the date your Eduorat access code will ) 4. Enter the last four digits of your Social Security Number (xxxx) and Date of Birth (mm/dd/yyyy) as indicated and click Submit. You will be taken to the next sign-up page. 5. Create a Eduorat ID. This will be your Breezie login ID and cannot be changed, so think of one that is secure and easy to remember. 6. Create a Eduorat password. You can change your password at any time. 7. Enter your Password Reset Question and Answer. This can be used at a later time if you forget your password. 8. Enter your e-mail address. You will receive e-mail notification when new information is available in [address] 9. Click Sign Up. You can now view and download portions of your medical record. 10. Click the Download Summary menu link to download a portable copy of your medical information. Additional Information If you have questions, please visit the Frequently Asked Questions section of the Breezie website at [email or web address] Remember, Breezie is NOT to be used for urgent needs. For medical emergencies, dial 911. The discharge information has been reviewed with the patient and spouse. The patient and spouse verbalized understanding. Discharge medications reviewed with the patient and spouse and appropriate educational materials and side effects teaching were provided. Laparoscopically Assisted Vaginal Hysterectomy: What to Expect at [facility] Your Recovery You can expect to feel better and stronger each day, although you may need pain medicine for a week or two.  You may get tired easily or have less energy than usual. This may last for several weeks after surgery. You will probably notice that your belly is swollen and puffy. This is common. The swelling will take several weeks to go down. It may take about 4 to 6 weeks to fully recover. The recovery time may be less for some patients. You may have some light vaginal bleeding. Don't have sex until the doctor says it is okay. Don't douche or put anything into your vagina, such as a tampon, until your doctor says it is okay. It is important to avoid lifting while you are recovering so that you can heal. 
This care sheet gives you a general idea about how long it will take for you to recover. But each person recovers at a different pace. Follow the steps below to get better as quickly as possible. How can you care for yourself at home? Activity · Rest when you feel tired. Getting enough sleep will help you recover. · Try to walk each day. Start by walking a little more than you did the day before. Bit by bit, increase the amount you walk. Walking boosts blood flow and helps prevent pneumonia and constipation. · Avoid lifting anything that would make you strain. This may include heavy grocery bags and milk containers, a heavy briefcase or backpack, cat litter or dog food bags, a vacuum , or a child. · Avoid strenuous activities, such as biking, jogging, weight lifting, or aerobic exercise, until your doctor says it is okay. · You may shower. Pat the cut (incision) dry. Do not take a bath for the first 2 weeks, or until your doctor tells you it is okay. · Ask your doctor when you can drive again. · You will probably need to take about 2 weeks off from work. It depends on the type of work you do and how you feel. · Your doctor will tell you when you can have sex again. Diet · You can eat your normal diet. If your stomach is upset, try bland, low-fat foods like plain rice, broiled chicken, toast, and yogurt. · Drink plenty of fluids (unless your doctor tells you not to). · You may notice that your bowel movements are not regular right after your surgery. This is common. Try to avoid constipation and straining with bowel movements. You may want to take a fiber supplement every day. If you have not had a bowel movement after a couple of days, ask your doctor about taking a mild laxative. Medicines · Your doctor will tell you if and when you can restart your medicines. He or she will also give you instructions about taking any new medicines. · If you take blood thinners, such as warfarin (Coumadin), clopidogrel (Plavix), or aspirin, be sure to talk to your doctor. He or she will tell you if and when to start taking those medicines again. Make sure that you understand exactly what your doctor wants you to do. · Be safe with medicines. Take pain medicines exactly as directed. ¨ If the doctor gave you a prescription medicine for pain, take it as prescribed. ¨ If you are not taking a prescription pain medicine, ask your doctor if you can take an over-the-counter medicine. · If you think your pain medicine is making you sick to your stomach: 
¨ Take your medicine after meals (unless your doctor has told you not to). ¨ Ask your doctor for a different pain medicine. · If your doctor prescribed antibiotics, take them as directed. Do not stop taking them just because you feel better. You need to take the full course of antibiotics. Incision care · You may have stitches over the cuts (incisions) the doctor made in your belly. If you have strips of tape on the incisions the doctor made, leave the tape on for a week or until it falls off. Or follow your doctor's instructions for removing the tape. · Wash the area daily with warm, soapy water, and pat it dry. Don't use hydrogen peroxide or alcohol, which can slow healing. You may cover the area with a gauze bandage if it weeps or rubs against clothing.  Change the bandage every day. · Keep the area clean and dry. Other instructions · You may have some light vaginal bleeding. Wear sanitary pads if needed. Do not douche or use tampons. Follow-up care is a key part of your treatment and safety. Be sure to make and go to all appointments, and call your doctor if you are having problems. It's also a good idea to know your test results and keep a list of the medicines you take. When should you call for help? Call 911 anytime you think you may need emergency care. For example, call if: 
· You passed out (lost consciousness). · You have severe trouble breathing. · You have sudden chest pain and shortness of breath, or you cough up blood. Call your doctor now or seek immediate medical care if: 
· You have bright red vaginal bleeding that soaks one or more pads in an hour, or you have large clots. · You have foul-smelling discharge from your vagina. · You are sick to your stomach or cannot keep fluids down. · You have pain that does not get better after you take pain medicine. · You have loose stitches, or your incision comes open. · You have signs of infection, such as: 
¨ Increased pain, swelling, warmth, or redness. ¨ Red streaks leading from the incision. ¨ Pus draining from the incision. ¨ A fever. · You have signs of a blood clot, such as: 
¨ Pain in your calf, back of the knee, thigh, or groin. ¨ Redness and swelling in your leg or groin. · You have trouble passing urine or stool, especially if you have pain or swelling in your lower belly. Watch closely for changes in your health, and be sure to contact your doctor if: 
· You do not have a bowel movement after taking a laxative. · You have hot flashes, sweating, flushing, or a fast heartbeat, but no fever. Where can you learn more? Go to http://cesia-spenser.info/.  
Enter L535 in the search box to learn more about \"Laparoscopically Assisted Vaginal Hysterectomy: What to Expect at Home. \" Current as of: February 25, 2016 Content Version: 11.1 © 8194-3493 [employer]. Care instructions adapted under license by PromoJam (which disclaims liability or warranty for this information). If you have questions about a medical condition or this instruction, always ask your healthcare professional. [address] any warranty or liability for your use of this information. Discharge Orders None i.Sec Announcement We are excited to announce that we are making your provider's discharge notes available to you in i.Sec. You will see these notes when they are completed and signed by the physician that discharged you from your recent hospital stay. If you have any questions or concerns about any information you see in i.Sec, please call the Health Information Department where you were seen or reach out to your Primary Care Provider for more information about your plan of care. Introducing [facility] & HEALTH SERVICES! Dear April: 
Thank you for requesting a i.Sec account. Our records indicate that you already have an active i.Sec account. You can access your account anytime at [email or web address] Did you know that you can access your hospital and ER discharge instructions at any time in i.Sec? You can also review all of your test results from your hospital stay or ER visit. Additional Information If you have questions, please visit the Frequently Asked Questions section of the i.Sec website at [email or web address] Remember, i.Sec is NOT to be used for urgent needs. For medical emergencies, dial 911. Now available from your iPhone and Android! General Information Please provide this summary of care documentation to your next provider.  
  
  
    
    
 Patient Signature: ____________________________________________________________ Date:  ____________________________________________________________  
  
Earnstine Reece Provider Signature:  ____________________________________________________________ Date:  ____________________________________________________________

## 2017-01-30 NOTE — IP AVS SNAPSHOT
Current Discharge Medication List  
  
Take these medications at their scheduled times Dose & Instructions Dispensing Information Comments Morning Noon Evening Bedtime  
 multivitamin with iron tablet Your next dose is: Today, Tomorrow Other:  ____________ Dose:  1 Tab Take 1 Tab by mouth daily. Refills:  0 Take these medications as needed Dose & Instructions Dispensing Information Comments Morning Noon Evening Bedtime HYDROcodone-acetaminophen 5-325 mg per tablet Commonly known as:  Phil Phillip Your next dose is: Today, Tomorrow Other:  ____________ Dose:  1 Tab Take 1 Tab by mouth every four (4) hours as needed. Max Daily Amount: 6 Tabs. Indications: PAIN Quantity:  40 Tab Refills:  0  
     
   
   
   
  
 ibuprofen 800 mg tablet Commonly known as:  MOTRIN Your next dose is: Today, Tomorrow Other:  ____________ Dose:  800 mg Take 1 Tab by mouth every eight (8) hours as needed for Pain. Indications: PAIN Quantity:  30 Tab Refills:  0  
     
   
   
   
  
 simethicone 80 mg chewable tablet Commonly known as:  Anahy Rich Your next dose is: Today, Tomorrow Other:  ____________ Dose:  80 mg Take 1 Tab by mouth every six (6) hours as needed for Flatulence. Indications: FLATULENCE Quantity:  40 Tab Refills:  1 Where to Get Your Medications These medications were sent to [address] [place] [address]  [address] [facility], [address] [place] [address] Hours:  24-hours Phone:  [phone]  
  ibuprofen 800 mg tablet  
 simethicone 80 mg chewable tablet Information about where to get these medications is not yet available ! Ask your nurse or doctor about these medications HYDROcodone-acetaminophen 5-325 mg per tablet

## 2017-01-30 NOTE — H&P (VIEW-ONLY)
Preoperative Evaluation    Date of Exam: 2017 C Marko  is a 43 y.o. female (:1975) who presents for preoperative evaluation. Procedure/Surgery:Davinci Laparoscopic Hysterectomy/Salpingectomy; Ovarian preservation  Date of Procedure/Surgery: 2017  Surgeon: Madalyn Ferguson MD  Hospital/Surgical Facility: [facility]  Primary Physician: Jennifer Ibanez DO  Latex Allergy: no    Problem List:     Patient Active Problem List    Diagnosis Date Noted    Fibroids 2016    H/O excision of dermoid cyst 2016    Anemia 10/13/2014    Menorrhagia 10/03/2014     Medical History:     Past Medical History   Diagnosis Date    Anemia 10/13/2014    Breast lump 08/15/2016     Right braest lump 2 oclock per MD    Fibroids 2016    H/O excision of dermoid cyst 2016     Allergies:   No Known Allergies   Medications:     Current Outpatient Prescriptions   Medication Sig    multivitamin with iron tablet Take 1 Tab by mouth daily. No current facility-administered medications for this visit. Surgical History:     Past Surgical History   Procedure Laterality Date    Hx cyst removal       right ovary    Hx  section  2001    Hx tubal ligation  2001     Social History:     Social History     Social History    Marital status: SINGLE     Spouse name: N/A    Number of children: N/A    Years of education: N/A     Social History Main Topics    Smoking status: Never Smoker    Smokeless tobacco: Never Used    Alcohol use Yes      Comment: occ once a month.      Drug use: No    Sexual activity: Yes     Partners: Male     Birth control/ protection: None     Other Topics Concern    Not on file     Social History Narrative       Recent use of: No recent use of aspirin (ASA), NSAIDS or steroids    Tetanus up to date: tetanus status unknown to the patient      Anesthesia Complications: None  History of abnormal bleeding : None  History of Blood Transfusions: no  Health Care Directive or Living Will: no    REVIEW OF SYSTEMS:  A comprehensive review of systems was negative except for that written in the HPI. EXAM:   Visit Vitals    Ht 5' 3\" (1.6 m)     General appearance - alert, well appearing, and in no distress and normal appearing weight  Mental status - alert, oriented to person, place, and time  Chest - clear to auscultation, no wheezes, rales or rhonchi, symmetric air entry  Heart - normal rate, regular rhythm, normal S1, S2, no murmurs, rubs, clicks or gallops  Abdomen - soft, nontender, nondistended, no masses or organomegaly  Pelvic - 12-14 week sized uterus. Mobile      DIAGNOSTICS:   1. EKG: EKG FINDINGS - pending  2. CXR: not indicated  3. Labs: pending     Ultrasound: 12 x 8x 6 cm multi-fibroid uterus. IMPRESSION:   44 y/o with symptomatic uterine fibroids. Desires definitive management. Pt. Doesn't have any known chronic health problems- labs are pending with EKG. Risks, benefits, and alternatives to this procedure were explained in detail, including risks of infection, damage to surrounding structures, and bleeding. No contraindications to planned surgery pending normal labs.     Saira Winters MD   1/25/2017

## 2017-01-30 NOTE — INTERVAL H&P NOTE
H&P Update:  April C Laura Christianson was seen and examined. Reviewed recent labs and EKG- all within normal limits. History and physical has been reviewed. The patient has been examined.  There have been no significant clinical changes since the completion of the originally dated History and Physical.    Signed By: Yu Murrell MD     January 30, 2017 10:48 AM

## 2017-01-30 NOTE — ANESTHESIA PREPROCEDURE EVALUATION
Anesthetic History               Review of Systems / Medical History  Patient summary reviewed, nursing notes reviewed and pertinent labs reviewed    Pulmonary                   Neuro/Psych              Cardiovascular                       GI/Hepatic/Renal                Endo/Other        Anemia    Comments: Uterine fibroids, menorrhagia Other Findings   Comments: Current Smoker? NO       Elective Surgery? Yes       Abstained from smoking 24 hours prior to anesthesia? N/A    Risk Factors for Postoperative nausea/vomiting:       History of postoperative nausea/vomiting? NO       Female? NO       Motion sickness? NO       Intended opioid administration for postoperative analgesia?   YES           Physical Exam    Airway  Mallampati: II  TM Distance: > 6 cm  Neck ROM: normal range of motion   Mouth opening: Normal     Cardiovascular    Rhythm: regular  Rate: normal         Dental    Dentition: Poor dentition and Upper partial plate     Pulmonary  Breath sounds clear to auscultation               Abdominal  GI exam deferred       Other Findings            Anesthetic Plan    ASA: 2  Anesthesia type: general          Induction: Intravenous  Anesthetic plan and risks discussed with: Patient

## 2017-01-31 VITALS
WEIGHT: 172 LBS | OXYGEN SATURATION: 100 % | DIASTOLIC BLOOD PRESSURE: 73 MMHG | RESPIRATION RATE: 14 BRPM | TEMPERATURE: 98.8 F | BODY MASS INDEX: 31.65 KG/M2 | HEART RATE: 73 BPM | HEIGHT: 62 IN | SYSTOLIC BLOOD PRESSURE: 119 MMHG

## 2017-01-31 LAB
BASOPHILS # BLD AUTO: 0 K/UL (ref 0–0.06)
BASOPHILS # BLD: 0 % (ref 0–2)
DIFFERENTIAL METHOD BLD: ABNORMAL
EOSINOPHIL # BLD: 0 K/UL (ref 0–0.4)
EOSINOPHIL NFR BLD: 0 % (ref 0–5)
ERYTHROCYTE [DISTWIDTH] IN BLOOD BY AUTOMATED COUNT: 13.5 % (ref 11.6–14.5)
HCT VFR BLD AUTO: 32.1 % (ref 35–45)
HGB BLD-MCNC: 10.3 G/DL (ref 12–16)
LYMPHOCYTES # BLD AUTO: 14 % (ref 21–52)
LYMPHOCYTES # BLD: 1.6 K/UL (ref 0.9–3.6)
MCH RBC QN AUTO: 28.5 PG (ref 24–34)
MCHC RBC AUTO-ENTMCNC: 32.1 G/DL (ref 31–37)
MCV RBC AUTO: 88.7 FL (ref 74–97)
MONOCYTES # BLD: 1.2 K/UL (ref 0.05–1.2)
MONOCYTES NFR BLD AUTO: 10 % (ref 3–10)
NEUTS SEG # BLD: 8.5 K/UL (ref 1.8–8)
NEUTS SEG NFR BLD AUTO: 76 % (ref 40–73)
PLATELET # BLD AUTO: 269 K/UL (ref 135–420)
PMV BLD AUTO: 9.8 FL (ref 9.2–11.8)
RBC # BLD AUTO: 3.62 M/UL (ref 4.2–5.3)
WBC # BLD AUTO: 11.4 K/UL (ref 4.6–13.2)

## 2017-01-31 PROCEDURE — 74011250636 HC RX REV CODE- 250/636: Performed by: OBSTETRICS & GYNECOLOGY

## 2017-01-31 PROCEDURE — 85025 COMPLETE CBC W/AUTO DIFF WBC: CPT | Performed by: OBSTETRICS & GYNECOLOGY

## 2017-01-31 PROCEDURE — 74011250637 HC RX REV CODE- 250/637: Performed by: OBSTETRICS & GYNECOLOGY

## 2017-01-31 PROCEDURE — 77030020255 HC SOL INJ LR 1000ML BG

## 2017-01-31 PROCEDURE — 36415 COLL VENOUS BLD VENIPUNCTURE: CPT | Performed by: OBSTETRICS & GYNECOLOGY

## 2017-01-31 RX ORDER — SIMETHICONE 80 MG
80 TABLET,CHEWABLE ORAL
Qty: 40 TAB | Refills: 1 | Status: SHIPPED | OUTPATIENT
Start: 2017-01-31 | End: 2017-02-16 | Stop reason: ALTCHOICE

## 2017-01-31 RX ORDER — IBUPROFEN 800 MG/1
800 TABLET ORAL
Qty: 30 TAB | Refills: 0 | Status: SHIPPED | OUTPATIENT
Start: 2017-01-31 | End: 2017-07-17 | Stop reason: ALTCHOICE

## 2017-01-31 RX ORDER — IBUPROFEN 800 MG/1
800 TABLET ORAL
Qty: 30 TAB | Refills: 0 | Status: SHIPPED | OUTPATIENT
Start: 2017-01-31 | End: 2017-01-31

## 2017-01-31 RX ORDER — SIMETHICONE 80 MG
80 TABLET,CHEWABLE ORAL
Status: DISCONTINUED | OUTPATIENT
Start: 2017-01-31 | End: 2017-01-31 | Stop reason: HOSPADM

## 2017-01-31 RX ORDER — SIMETHICONE 80 MG
80 TABLET,CHEWABLE ORAL
Qty: 30 TAB | Refills: 0 | Status: SHIPPED | OUTPATIENT
Start: 2017-01-31 | End: 2017-01-31

## 2017-01-31 RX ORDER — HYDROCODONE BITARTRATE AND ACETAMINOPHEN 5; 325 MG/1; MG/1
1 TABLET ORAL
Qty: 40 TAB | Refills: 0 | Status: SHIPPED | OUTPATIENT
Start: 2017-01-31 | End: 2017-07-17 | Stop reason: ALTCHOICE

## 2017-01-31 RX ADMIN — SODIUM CHLORIDE, SODIUM LACTATE, POTASSIUM CHLORIDE, AND CALCIUM CHLORIDE 125 ML/HR: 600; 310; 30; 20 INJECTION, SOLUTION INTRAVENOUS at 02:45

## 2017-01-31 RX ADMIN — HYDROMORPHONE HYDROCHLORIDE 1 MG: 1 INJECTION, SOLUTION INTRAMUSCULAR; INTRAVENOUS; SUBCUTANEOUS at 04:06

## 2017-01-31 RX ADMIN — Medication 10 ML: at 05:18

## 2017-01-31 RX ADMIN — SIMETHICONE CHEW TAB 80 MG 80 MG: 80 TABLET ORAL at 14:01

## 2017-01-31 RX ADMIN — HYDROCODONE BITARTRATE AND ACETAMINOPHEN 1 TABLET: 5; 325 TABLET ORAL at 09:27

## 2017-01-31 RX ADMIN — SODIUM CHLORIDE, SODIUM LACTATE, POTASSIUM CHLORIDE, AND CALCIUM CHLORIDE 125 ML/HR: 600; 310; 30; 20 INJECTION, SOLUTION INTRAVENOUS at 02:49

## 2017-01-31 RX ADMIN — HYDROCODONE BITARTRATE AND ACETAMINOPHEN 1 TABLET: 5; 325 TABLET ORAL at 19:35

## 2017-01-31 RX ADMIN — SODIUM CHLORIDE, SODIUM LACTATE, POTASSIUM CHLORIDE, AND CALCIUM CHLORIDE 125 ML/HR: 600; 310; 30; 20 INJECTION, SOLUTION INTRAVENOUS at 09:27

## 2017-01-31 NOTE — ACP (ADVANCE CARE PLANNING)
Patient has designated ________________________ to participate in his/her discharge plan and to receive any needed information.      Name:   Address:  Phone number:    Lucita Lennox

## 2017-01-31 NOTE — PROGRESS NOTES
[facility]   Discharge Planning/ Assessment    Reasons for Intervention: Chart reviewed. Met with pt., verified all demographics. [facility] has BC ins. [facility] Dr. Derrick Puentes is her PCP. NOK: Baldemar Savage, son: [phone]. [facility] lives with fiancee & 2 children, ages 25 & 13. Uses no DME. Independent with ADL's prior to admit. PLAN: home. Available as needed. Pat [address] [facility] [address]. [address].       High Risk Criteria  [] Yes  [x]No   Physician Referral  [] Yes  [x]No        Date    Nursing Referral  [] Yes  [x]No        Date    Patient/Family Request  [] Yes  [x]No        Date       Resources:    Medicare  [] Yes  [x]No   Medicaid  [] Yes  [x]No   No Resources  [] Yes  [x]No   Private Insurance  [x] Yes  []No    Name/Phone Number    Other  [] Yes  [x]No        (i.e. Workman's Comp)         Prior Services:    Prior Services  [] Yes  [x]No   Home Health  [] Yes  [x]No   [facility]  [] Yes  [x]No        Number of [address] Program  [] Yes  [x]No       Meals on Wheels  [] Yes  [x]No   Office on Aging  [] Yes  [x]No   Transportation Services  [] Yes  [x]No   Nursing Home  [] Yes  [x]No        Nursing Home Name    [facility]  [] Yes  [x]No        [address] Name    Other       Information Source:      Information obtained from  [x] Patient  [] Parent   [] [address]   [] Child  [] Spouse   [] Significant Other/Partner   [] Friend      [] EMS    [] Nursing Home Chart          [] Other:   Chart Review  [x] Yes  []No     Family/Support System:    Patient lives with  [] Alone    [] Spouse   [x] Significant Other  [x] Children  [] Caretaker   [] Parent  [] Sibling     [] Other       Other Support System:    Is the patient responsible for care of others  [x] Yes  []No   Information of person caring for patient on  discharge    Managers financial affairs independently  [x] Yes  []No   If no, explain:      Status Prior to Admission:    Mental Status  [x] Awake [x] Alert  [x] Oriented  [x] Quiet/Calm [] Lethargic/Sedated   [] Disoriented  [] Restless/Anxious  [] Combative   Personal Care  [] Dependent  [x] Independent Personal Care  [] Requires Assistance   Meal Preparation Ability  [x] Independent   [] Standby Assistance   [] Minimal Assistance   [] Moderate Assistance  [] Maximum Assistance     [] Total Assistance   Chores  [x] Independent with Chores   [] N/A Nursing Home Resident   [] Requires Assistance   Bowel/Bladder  [x] Continent  [] Catheter  [] Incontinent  [] Ostomy Self-Care    [] Urine Diversion Self-Care  [] Maximum Assistance     [] Total Assistance   Number of Persons needed for assistance    DME at home  [] [address], Sherral Cord  [] [address], Straight   [] Commode    [] Bathroom/Grab Bars  [] Hospital Bed  [] Nebulizer  [] Oxygen           [] Raised Toilet Seat  [] Shower Chair  [] Side Rails for Bed   [] Tub Transfer Bench   [] Dionisio Temple  [] Angy Gates, Standard      [] Other:   Vendor      Treatment Presently Receiving:    Current Treatments  [] Chemotherapy  [] Dialysis  [] Insulin  [] IVAB [x] IVF   [] O2  [] PCA   [] PT   [] RT   [] Tube Feedings   [] Wound Care     Psychosocial Evaluation:    Verbalized Knowledge of Disease Process  [] Patient  []Family   Coping with Disease Process  [] Patient  []Family   Requires Further Counseling Coping with Disease Process  [] Patient  []Family     Identified Projected Needs:    Home Health Aid  [] Yes  [x]No   Transportation  [] Yes  [x]No   Education  [] Yes  [x]No        Specific Education     Financial Counseling  [] Yes  [x]No   Inability to Care for Self/Will Require 24 hour care  [] Yes  [x]No   Pain Management  [] Yes  [x]No   Home Infusion Therapy  [] Yes  [x]No   Oxygen Therapy  [] Yes  [x]No   DME  [] Yes  [x]No   Long Term Care Placement  [] Yes  [x]No   Rehab  [] Yes  [x]No   Physical Therapy  [] Yes  [x]No   Needs Anticipated At This Time  [] Yes  [x]No     Intra-Hospital Referral:    [address]  [] Yes  [x]No     [] Yes  [x]No   Patient Representative  [] Yes  [x]No   Staff for Teaching Needs  [] Yes  [x]No   Specialty Teaching Needs     Diabetic Educator  [] Yes  [x]No   Referral for Diabetic Educator Needed  [] Yes  [x]No  If Yes, place order for Nutritionist or Diabetic Consult     Tentative Discharge Plan:    Home with No Services  [x] Yes  []No   Home with [address]  [] Yes  [x]No        If Yes, specify type    Home Care Program  [] Yes  [x]No        If Yes, specify type    Meals on Wheels  [] Yes  [x]No   Office of Aging  [] Yes  [x]No   NHP  [] Yes  [x]No   Return to the Nursing Home  [] Yes  [x]No   Rehab Therapy  [] Yes  [x]No   Acute Rehab  [] Yes  [x]No   Subacute Rehab  [] Yes  [x]No   Private Care  [] Yes  [x]No   Substance Abuse Referral  [] Yes  [x]No   Transportation  [] Yes  [x]No   Chore Service  [] Yes  [x]No   Inpatient Hospice  [] Yes  [x]No   OP RT  [] Yes  [x] No   OP Hemo  [] Yes  [x] No   OP PT  [] Yes  [x]No   Support Group  [] Yes  [x]No   Reach to Recovery  [] Yes  [x]No   OP Oncology Clinic  [] Yes  [x]No   Clinic Appointment  [] Yes  [x]No   DME  [] Yes  [x]No   Comments    Name of D/C Planner or  Given to Patient or Family Beatriceluis m Tena   Phone Number Pager: [phone]        [address] [facility] [phone]   Date 1-   Time    If you are discharged home, whom do you designate to participate in your discharge plan and receive any information needed?      Enter name of designee         Phone # of designee         Address of designee         Updated         Patient refused to designate any           individual Declined

## 2017-01-31 NOTE — PROGRESS NOTES
Gynecology Progress Note    Patient doing well post-op day 1 from Procedure(s):  davinci total laparoscopic HYSTERECTOMY, left salpingectomy observational cystoscopy ROBOTIC ASSISTED without significant complaints. Pain controlled on current medication. Patient has not voided- solis was removed this am. Patient is passing flatus. Vitals:  Blood pressure 112/72, pulse 72, temperature 98.1 °F (36.7 °C), resp. rate 14, height 5' 2\" (1.575 m), weight 172 lb (78 kg), last menstrual period 2017, SpO2 93 %, not currently breastfeeding. Temp (24hrs), Av °F (36.7 °C), Min:97.5 °F (36.4 °C), Max:98.5 °F (36.9 °C)        Exam:  Patient without distress. Abdomen soft,  nontender. Incision dry and clean without erythema. Lower extremities are negative for swelling, cords, or tenderness. Lab/Data Review:  CBC:   Lab Results   Component Value Date/Time    WBC 11.4 2017 04:35 AM    HGB 10.3 (L) 2017 04:35 AM    HCT 32.1 (L) 2017 04:35 AM     2017 04:35 AM       Assessment and Plan:  Patient appears to be having uncomplicated post Procedure(s):  davinci total laparoscopic HYSTERECTOMY, left salpingectomy observational cystoscopy ROBOTIC ASSISTED course. Continue routine post-op care. 1.  OOB with assist.  2. Advance diet.

## 2017-01-31 NOTE — DISCHARGE SUMMARY
Gynecologic Discharge Summary       [facility]  [address] [address]              Patient ID:  Gentry Hernandez  [id number]  43 y.o.  1975    Admit Date: 1/30/2017    Discharge Date: 1/31/2017     Admitting Physician: Huey Banks MD     Admission Diagnoses: n92.0,d25.9,d64.9;n92.0,d25.9,d64.9    Discharge Diagnoses: same as above      Additional Diagnoses: none        Hospital Course:  Pt. Had a [address] hysterectomy. She has done well post-operative. She is afebrile, vitals are stable. She is ambulatory, passing flatus and has minimal appetite, but able to tolerate PO. She has controlled pain and is stable for discharge. This patient has no babies on file. Immunizations:    Immunization History   Administered Date(s) Administered    Hep B Vaccine (Adult) 07/22/2016, 08/22/2016    MMR 08/16/2016    TB Skin Test (PPD) Intradermal 07/22/2016    Tdap 07/22/2016       Group Beta Strep: No results found for: GRBSEXT     Visit Vitals    /73 (BP 1 Location: Right arm, BP Patient Position: At rest)    Pulse 73    Temp 98.8 °F (37.1 °C)    Resp 14    Ht 5' 2\" (1.575 m)    Wt 172 lb (78 kg)    LMP 01/06/2017    SpO2 100%    Breastfeeding No    BMI 31.46 kg/m2       Vital signs stable, afebrile. Exam:  Patient without distress. Abdomen soft, fundus firm at level of umbilicus, non tender               Perineum with normal lochia noted. Lower extremities are negative for swelling, cords or tenderness. Patient Instructions:   Current Discharge Medication List      START taking these medications    Details   HYDROcodone-acetaminophen (NORCO) 5-325 mg per tablet Take 1 Tab by mouth every four (4) hours as needed. Max Daily Amount: 6 Tabs.  Indications: PAIN  Qty: 40 Tab, Refills: 0      simethicone (MYLICON) 80 mg chewable tablet Take 1 Tab by mouth four (4) times daily as needed. Qty: 30 Tab, Refills: 0      ibuprofen (MOTRIN) 800 mg tablet Take 1 Tab by mouth every eight (8) hours as needed for Pain. Indications: PAIN  Qty: 30 Tab, Refills: 0         CONTINUE these medications which have NOT CHANGED    Details   multivitamin with iron tablet Take 1 Tab by mouth daily. See discharge instructions provided by nursing. Follow-up in 2 weeks. The plan of care has been discussed with the patient. She has been given the opportunity to ask questions, which seem to have been answered satisfactorily.       Signed:  Kevin Billy MD  1/31/2017  6:37 PM

## 2017-01-31 NOTE — ROUTINE PROCESS
Bedside and Verbal shift change report given to Tomas Syed RN (oncoming nurse) by Judson Oconnor RN   (offgoing nurse). Report included the following information SBAR, Kardex and MAR.

## 2017-01-31 NOTE — PROGRESS NOTES
Patient and/or next of kin has been given the Outpatient Observation Information and Notification letter and all questions answered. Pat [address] [place] [address]. [address].

## 2017-01-31 NOTE — PROGRESS NOTES
0831  Tolerated her diet, due to void, triflo  Been used, bell with in reach. 1100  Up  Walking, triflo used, voiding well    1400  Pt said she is cold, temp 99.2, triflo been  Used and when pain eases, she will walk in the hallway. 1900  Tolerated her diet, pain under control. 1940  Discharge instruction given, verbalized understanding. , medicated for pain prior to discharge.

## 2017-02-01 NOTE — DISCHARGE INSTRUCTIONS
DISCHARGE SUMMARY from Nurse    The following personal items are in your possession at time of discharge:    Dental Appliances: None  Visual Aid: None     Home Medications: None  Jewelry: None  Clothing: None  Other Valuables: None             PATIENT INSTRUCTIONS:    After general anesthesia or intravenous sedation, for 24 hours or while taking prescription Narcotics:  · Limit your activities  · Do not drive and operate hazardous machinery  · Do not make important personal or business decisions  · Do  not drink alcoholic beverages  · If you have not urinated within 8 hours after discharge, please contact your surgeon on call. Report the following to your surgeon:  · Excessive pain, swelling, redness or odor of or around the surgical area  · Temperature over 100.5  · Nausea and vomiting lasting longer than 4 hours or if unable to take medications  · Any signs of decreased circulation or nerve impairment to extremity: change in color, persistent  numbness, tingling, coldness or increase pain  · Any questions        What to do at Home:  Recommended activity: Activity as tolerated and no driving for today and Ambulate in house,     If you experience any of the following symptoms like increasing pain  , please follow up with Dr Severa Ni  . *  Please give a list of your current medications to your Primary Care Provider. *  Please update this list whenever your medications are discontinued, doses are      changed, or new medications (including over-the-counter products) are added. *  Please carry medication information at all times in case of emergency situations. These are general instructions for a healthy lifestyle:    No smoking/ No tobacco products/ Avoid exposure to second hand smoke    Surgeon General's Warning:  Quitting smoking now greatly reduces serious risk to your health.     Obesity, smoking, and sedentary lifestyle greatly increases your risk for illness    A healthy diet, regular physical exercise & weight monitoring are important for maintaining a healthy lifestyle    You may be retaining fluid if you have a history of heart failure or if you experience any of the following symptoms:  Weight gain of 3 pounds or more overnight or 5 pounds in a week, increased swelling in our hands or feet or shortness of breath while lying flat in bed. Please call your doctor as soon as you notice any of these symptoms; do not wait until your next office visit. Recognize signs and symptoms of STROKE:    F-face looks uneven    A-arms unable to move or move unevenly    S-speech slurred or non-existent    T-time-call 911 as soon as signs and symptoms begin-DO NOT go       Back to bed or wait to see if you get better-TIME IS BRAIN. Warning Signs of HEART ATTACK     Call 911 if you have these symptoms:   Chest discomfort. Most heart attacks involve discomfort in the center of the chest that lasts more than a few minutes, or that goes away and comes back. It can feel like uncomfortable pressure, squeezing, fullness, or pain.  Discomfort in other areas of the upper body. Symptoms can include pain or discomfort in one or both arms, the back, neck, jaw, or stomach.  Shortness of breath with or without chest discomfort.  Other signs may include breaking out in a cold sweat, nausea, or lightheadedness. Don't wait more than five minutes to call 911 - MINUTES MATTER! Fast action can save your life. Calling 911 is almost always the fastest way to get lifesaving treatment. Emergency Medical Services staff can begin treatment when they arrive -- up to an hour sooner than if someone gets to the hospital by car. Patient armband removed and shredded  [employer] Activation    Thank you for requesting access to [facility]. Please follow the instructions below to securely access and download your online medical record.  [facility] allows you to send messages to your doctor, view your test results, renew your prescriptions, schedule appointments, and more. How Do I Sign Up? 1. In your internet browser, go to www.[facility]  2. Click on the First Time User? Click Here link in the Sign In box. You will be redirect to the New Member Sign Up page. 3. Enter your [employer] Access Code exactly as it appears below. You will not need to use this code after youve completed the sign-up process. If you do not sign up before the expiration date, you must request a new code. MyChart Access Code: Activation code not generated  Current [employer]on Status: Active (This is the date your [employer] access code will )    4. Enter the last four digits of your Social Security Number (xxxx) and Date of Birth (mm/dd/yyyy) as indicated and click Submit. You will be taken to the next sign-up page. 5. Create a [employer]on ID. This will be your [employer]on login ID and cannot be changed, so think of one that is secure and easy to remember. 6. Create a [employer] password. You can change your password at any time. 7. Enter your Password Reset Question and Answer. This can be used at a later time if you forget your password. 8. Enter your e-mail address. You will receive e-mail notification when new information is available in [address] 9. Click Sign Up. You can now view and download portions of your medical record. 10. Click the Download Summary menu link to download a portable copy of your medical information. Additional Information    If you have questions, please visit the Frequently Asked Questions section of the [employer] website at [email or web address] Remember, [employer] is NOT to be used for urgent needs. For medical emergencies, dial 911. The discharge information has been reviewed with the patient and spouse. The patient and spouse verbalized understanding.     Discharge medications reviewed with the patient and spouse and appropriate educational materials and side effects teaching were provided. Laparoscopically Assisted Vaginal Hysterectomy: What to Expect at [address] [place] [address] can expect to feel better and stronger each day, although you may need pain medicine for a week or two. You may get tired easily or have less energy than usual. This may last for several weeks after surgery. You will probably notice that your belly is swollen and puffy. This is common. The swelling will take several weeks to go down. It may take about 4 to 6 weeks to fully recover. The recovery time may be less for some patients. You may have some light vaginal bleeding. Don't have sex until the doctor says it is okay. Don't douche or put anything into your vagina, such as a tampon, until your doctor says it is okay. It is important to avoid lifting while you are recovering so that you can heal.  This care sheet gives you a general idea about how long it will take for you to recover. But each person recovers at a different pace. Follow the steps below to get better as quickly as possible. How can you care for yourself at home? Activity  · Rest when you feel tired. Getting enough sleep will help you recover. · Try to walk each day. Start by walking a little more than you did the day before. Bit by bit, increase the amount you walk. Walking boosts blood flow and helps prevent pneumonia and constipation. · Avoid lifting anything that would make you strain. This may include heavy grocery bags and milk containers, a heavy briefcase or backpack, cat litter or dog food bags, a vacuum , or a child. · Avoid strenuous activities, such as biking, jogging, weight lifting, or aerobic exercise, until your doctor says it is okay. · You may shower. Pat the cut (incision) dry. Do not take a bath for the first 2 weeks, or until your doctor tells you it is okay. · Ask your doctor when you can drive again. · You will probably need to take about 2 weeks off from work.  It depends on the type of work you do and how you feel. · Your doctor will tell you when you can have sex again. Diet  · You can eat your normal diet. If your stomach is upset, try bland, low-fat foods like plain rice, broiled chicken, toast, and yogurt. · Drink plenty of fluids (unless your doctor tells you not to). · You may notice that your bowel movements are not regular right after your surgery. This is common. Try to avoid constipation and straining with bowel movements. You may want to take a fiber supplement every day. If you have not had a bowel movement after a couple of days, ask your doctor about taking a mild laxative. Medicines  · Your doctor will tell you if and when you can restart your medicines. He or she will also give you instructions about taking any new medicines. · If you take blood thinners, such as warfarin (Coumadin), clopidogrel (Plavix), or aspirin, be sure to talk to your doctor. He or she will tell you if and when to start taking those medicines again. Make sure that you understand exactly what your doctor wants you to do. · Be safe with medicines. Take pain medicines exactly as directed. ¨ If the doctor gave you a prescription medicine for pain, take it as prescribed. ¨ If you are not taking a prescription pain medicine, ask your doctor if you can take an over-the-counter medicine. · If you think your pain medicine is making you sick to your stomach:  ¨ Take your medicine after meals (unless your doctor has told you not to). ¨ Ask your doctor for a different pain medicine. · If your doctor prescribed antibiotics, take them as directed. Do not stop taking them just because you feel better. You need to take the full course of antibiotics. Incision care  · You may have stitches over the cuts (incisions) the doctor made in your belly. If you have strips of tape on the incisions the doctor made, leave the tape on for a week or until it falls off. Or follow your doctor's instructions for removing the tape.   · Wash the area daily with warm, soapy water, and pat it dry. Don't use hydrogen peroxide or alcohol, which can slow healing. You may cover the area with a gauze bandage if it weeps or rubs against clothing. Change the bandage every day. · Keep the area clean and dry. Other instructions  · You may have some light vaginal bleeding. Wear sanitary pads if needed. Do not douche or use tampons. Follow-up care is a key part of your treatment and safety. Be sure to make and go to all appointments, and call your doctor if you are having problems. It's also a good idea to know your test results and keep a list of the medicines you take. When should you call for help? Call 911 anytime you think you may need emergency care. For example, call if:  · You passed out (lost consciousness). · You have severe trouble breathing. · You have sudden chest pain and shortness of breath, or you cough up blood. Call your doctor now or seek immediate medical care if:  · You have bright red vaginal bleeding that soaks one or more pads in an hour, or you have large clots. · You have foul-smelling discharge from your vagina. · You are sick to your stomach or cannot keep fluids down. · You have pain that does not get better after you take pain medicine. · You have loose stitches, or your incision comes open. · You have signs of infection, such as:  ¨ Increased pain, swelling, warmth, or redness. ¨ Red streaks leading from the incision. ¨ Pus draining from the incision. ¨ A fever. · You have signs of a blood clot, such as:  ¨ Pain in your calf, back of the knee, thigh, or groin. ¨ Redness and swelling in your leg or groin. · You have trouble passing urine or stool, especially if you have pain or swelling in your lower belly. Watch closely for changes in your health, and be sure to contact your doctor if:  · You do not have a bowel movement after taking a laxative.   · You have hot flashes, sweating, flushing, or a fast heartbeat, but no fever. Where can you learn more? Go to http://cesia-spenser.info/. Enter Z701 in the search box to learn more about \"Laparoscopically Assisted Vaginal Hysterectomy: What to Expect at Home. \"  Current as of: February 25, 2016  Content Version: 11.1  © 3193-7006 [employer], [employer]. Care instructions adapted under license by [employer] (which disclaims liability or warranty for this information). If you have questions about a medical condition or this instruction, always ask your healthcare professional. [address] any warranty or liability for your use of this information.

## 2017-02-08 RX ORDER — FLUCONAZOLE 150 MG/1
150 TABLET ORAL DAILY
Qty: 1 TAB | Refills: 0 | Status: SHIPPED | OUTPATIENT
Start: 2017-02-08 | End: 2017-02-09

## 2017-02-13 ENCOUNTER — OFFICE VISIT (OUTPATIENT)
Dept: OBGYN CLINIC | Age: 42
End: 2017-02-13

## 2017-02-13 VITALS
HEART RATE: 91 BPM | BODY MASS INDEX: 31.65 KG/M2 | SYSTOLIC BLOOD PRESSURE: 113 MMHG | HEIGHT: 62 IN | WEIGHT: 172 LBS | DIASTOLIC BLOOD PRESSURE: 75 MMHG

## 2017-02-13 DIAGNOSIS — N76.0 VAGINITIS AND VULVOVAGINITIS: ICD-10-CM

## 2017-02-13 DIAGNOSIS — Z09 POSTOP CHECK: Primary | ICD-10-CM

## 2017-02-13 RX ORDER — FLUCONAZOLE 150 MG/1
150 TABLET ORAL DAILY
Qty: 1 TAB | Refills: 0 | Status: SHIPPED | OUTPATIENT
Start: 2017-02-13 | End: 2017-02-14

## 2017-02-13 NOTE — PROGRESS NOTES
2 weeks post-op from a TLH/ILANA. Pt. States she is doing well. Denies vaginal discharge, bleeding or pain. Pt. Is tolerating a full diet, ambulating and has normal toiliet habits. Minimal constipation- encouraged increased fiber. Pt. Noted low grade temp of 99 on Saturday. Recommend monitoring it closely. No sick contacts. RTO 4 weeks or prn.

## 2017-02-16 ENCOUNTER — OFFICE VISIT (OUTPATIENT)
Dept: FAMILY MEDICINE CLINIC | Age: 42
End: 2017-02-16

## 2017-02-16 VITALS
BODY MASS INDEX: 31.65 KG/M2 | SYSTOLIC BLOOD PRESSURE: 112 MMHG | TEMPERATURE: 97.6 F | DIASTOLIC BLOOD PRESSURE: 72 MMHG | HEART RATE: 63 BPM | OXYGEN SATURATION: 96 % | RESPIRATION RATE: 16 BRPM | HEIGHT: 62 IN | WEIGHT: 172 LBS

## 2017-02-16 DIAGNOSIS — Z23 ENCOUNTER FOR IMMUNIZATION: ICD-10-CM

## 2017-02-16 DIAGNOSIS — R92.8 ABNORMAL MAMMOGRAM OF RIGHT BREAST: Primary | ICD-10-CM

## 2017-02-16 NOTE — MR AVS SNAPSHOT
Visit Information Date & Time Provider Department Dept. Phone Encounter #  
 2/16/2017 10:40 AM Dineshelanageri Velasquez, [address] [place] [address]  [id number] Follow-up Instructions Return in about 5 months (around 7/16/2017). Your Appointments 3/13/2017 10:30 AM  
Follow Up with Saira Winters MD  
[facility] ([facility]) Appt Note: follw-up [address] [phone]  
  
   
 [address] [phone] Upcoming Health Maintenance Date Due INFLUENZA AGE 9 TO ADULT 8/1/2016 PAP AKA CERVICAL CYTOLOGY 12/27/2019 DTaP/Tdap/Td series (2 - Td) 7/22/2026 Allergies as of 2/16/2017  Review Complete On: 2/16/2017 By: Jd Kennedy LPN No Known Allergies Current Immunizations  Reviewed on 8/16/2016 Name Date Hep B Vaccine (Adult)  Incomplete, 8/22/2016, 7/22/2016 MMR 8/16/2016 TB Skin Test (PPD) Intradermal 7/22/2016 Tdap 7/22/2016 Not reviewed this visit You Were Diagnosed With   
  
 Codes Comments Abnormal mammogram of right breast    -  Primary ICD-10-CM: R92.8 ICD-9-CM: 793.80 Encounter for immunization     ICD-10-CM: H76 ICD-9-CM: V03.89 Vitals BP Pulse Temp Resp Height(growth percentile) Weight(growth percentile) 112/72 (BP 1 Location: Right arm, BP Patient Position: Sitting) 63 97.6 °F (36.4 °C) (Oral) 16 5' 2\" (1.575 m) 172 lb (78 kg) LMP SpO2 BMI OB Status Smoking Status 01/06/2017 96% 31.46 kg/m2 Hysterectomy Never Smoker BMI and BSA Data Body Mass Index Body Surface Area  
 31.46 kg/m 2 1.85 m 2 Preferred Pharmacy Pharmacy Name Phone [id number], [address] [place] [address] [phone] Your Updated Medication List  
  
   
This list is accurate as of: 2/16/17 11:29 AM.  Always use your most recent med list.  
  
  
  
  
 HYDROcodone-acetaminophen 5-325 mg per tablet Commonly known as:  Birda Williams Take 1 Tab by mouth every four (4) hours as needed. Max Daily Amount: 6 Tabs. Indications: PAIN  
  
 ibuprofen 800 mg tablet Commonly known as:  MOTRIN Take 1 Tab by mouth every eight (8) hours as needed for Pain. Indications: PAIN  
  
 multivitamin with iron tablet Take 1 Tab by mouth daily. We Performed the Following HEPATITIS B VACCINE, ADULT DOSAGE, IM [56733 CPT(R)] Follow-up Instructions Return in about 5 months (around 7/16/2017). To-Do List   
 02/16/2017 Imaging:  CUCA MAMMO RT DX INCL CAD   
  
 02/16/2017 Imaging:  US BREAST RT LIMITED=<3 QUAD Introducing [facility] SERVICES! Dear April: 
Thank you for requesting a [email or web address] account. Our records indicate that you already have an active [email or web address] account. You can access your account anytime at [email or web address] Did you know that you can access your hospital and ER discharge instructions at any time in [email or web address]? You can also review all of your test results from your hospital stay or ER visit. Additional Information If you have questions, please visit the Frequently Asked Questions section of the [email or web address] website at [email or web address] Remember, [email or web address] is NOT to be used for urgent needs. For medical emergencies, dial 911. Now available from your iPhone and Android! Please provide this summary of care documentation to your next provider. Your primary care clinician is listed as Nathaniel White. If you have any questions after today's visit, please call [phone].

## 2017-02-16 NOTE — PROGRESS NOTES
1. Have you been to the ER, urgent care clinic since your last visit? Hospitalized since your last visit? No    2. Have you seen or consulted any other health care providers outside of the [address] [place] [address] since your last visit? Include any pap smears or colon screening.  No

## 2017-02-16 NOTE — PROGRESS NOTES
Subjective:     HPI:  Kanwal Bellamy is a 43 y.o. female who presents to the office for follow up on breast mass and 3rd Hepatitis B immunization. Breast Mass: Patient had a tender right breast mass at 2 o'clock noted on 16. Patient states she was told she needed to repeat breast exam in 6 months. US BREAST BI (8/15/16)   IMPRESSION:   1. No cystic or solid masses corresponding to the palpable abnormality. 2. Probable lymph node in the right upper outer quadrant. 3. No mammographic abnormality corresponding to small nodule in the left upper   outer quadrant. 4. Recommend short interval follow-up with mammogram and ultrasound in 6  months. Hepatitis B Immunization:   Kanwal Bellamy is a 39 y.o. female who presents for her Hepatitis B #3 immunization. She denies any symptoms , reactions or allergies that would exclude them from being immunized today. Discussed the dose regimen of vaccination and will proceed with vaccine today. Of note, patient underwent hysterectomy on 17. Current Outpatient Prescriptions   Medication Sig Dispense Refill    HYDROcodone-acetaminophen (NORCO) 5-325 mg per tablet Take 1 Tab by mouth every four (4) hours as needed. Max Daily Amount: 6 Tabs. Indications: PAIN 40 Tab 0    multivitamin with iron tablet Take 1 Tab by mouth daily.  ibuprofen (MOTRIN) 800 mg tablet Take 1 Tab by mouth every eight (8) hours as needed for Pain.  Indications: PAIN 30 Tab 0        No Known Allergies    Past Medical History   Diagnosis Date    Anemia 10/13/2014    Breast lump 08/15/2016     Right braest lump 2 oclock per MD    Fibroids 2016    H/O excision of dermoid cyst 2016        Past Surgical History   Procedure Laterality Date    Hx cyst removal       right ovary    Hx  section  2001    Hx tubal ligation  2001       Family History   Problem Relation Age of Onset    Diabetes Mother     Hypertension Mother     Heart Disease Mother     Heart Disease Father     Diabetes Maternal Grandmother        Social History     Social History    Marital status: SINGLE     Spouse name: N/A    Number of children: N/A    Years of education: N/A     Occupational History    Not on file. Social History Main Topics    Smoking status: Never Smoker    Smokeless tobacco: Never Used    Alcohol use Yes      Comment: occ once a month.  Drug use: No    Sexual activity: Yes     Partners: Male     Birth control/ protection: None     Other Topics Concern    Not on file     Social History Narrative       REVIEW OF SYSTEM:  Review of Systems   Constitutional: Negative for chills and fever. Eyes: Negative for blurred vision. Respiratory: Negative for shortness of breath. Cardiovascular: Negative for chest pain, palpitations and leg swelling. Gastrointestinal: Negative for constipation, diarrhea, nausea and vomiting. Musculoskeletal: Negative for joint pain. Neurological: Negative for headaches. Objective:     Visit Vitals    /72 (BP 1 Location: Right arm, BP Patient Position: Sitting)    Pulse 63    Temp 97.6 °F (36.4 °C) (Oral)    Resp 16    Ht 5' 2\" (1.575 m)    Wt 172 lb (78 kg)    LMP 01/06/2017    SpO2 96%    BMI 31.46 kg/m2       PHYSICAL EXAM:  Physical Exam   Constitutional: She is oriented to person, place, and time and well-developed, well-nourished, and in no distress. HENT:   Right Ear: Tympanic membrane, external ear and ear canal normal.   Left Ear: Tympanic membrane, external ear and ear canal normal.   Nose: Nose normal.   Mouth/Throat: Oropharynx is clear and moist.   Eyes: Pupils are equal, round, and reactive to light. Neck: Normal range of motion. Neck supple. No thyromegaly present. Cardiovascular: Normal rate, regular rhythm, normal heart sounds and intact distal pulses. No murmur heard. Pulmonary/Chest: Effort normal and breath sounds normal. She has no wheezes. Neurological: She is alert and oriented to person, place, and time. Skin: Skin is warm and dry. Vitals reviewed. Assessment/Plan:       ICD-10-CM ICD-9-CM    1. Abnormal mammogram of right breast R92.8 793.80 US BREAST RT LIMITED=<3 QUAD      CUCA MAMMO RT DX INCL CAD   2. Encounter for immunization Z23 V03.89 HEPATITIS B VACCINE, ADULT DOSAGE, IM      Breast imaging ordered. Patient given 3rd Hepatitis B immunization in office today. Patient given opportunity to ask questions. Questions answered. Patient understands plan of care. Follow-up Disposition:  Return in about 5 months (around 7/16/2017).         Written by Kasey Main, as dictated by Ammy Miller DO.

## 2017-02-23 ENCOUNTER — HOSPITAL ENCOUNTER (OUTPATIENT)
Dept: MAMMOGRAPHY | Age: 42
Discharge: HOME | End: 2017-02-23
Attending: FAMILY MEDICINE
Payer: COMMERCIAL

## 2017-02-23 ENCOUNTER — HOSPITAL ENCOUNTER (OUTPATIENT)
Dept: ULTRASOUND IMAGING | Age: 42
Discharge: HOME | End: 2017-02-23
Attending: FAMILY MEDICINE
Payer: COMMERCIAL

## 2017-02-23 DIAGNOSIS — R92.8 ABNORMAL MAMMOGRAM OF RIGHT BREAST: ICD-10-CM

## 2017-02-23 PROCEDURE — 77065 DX MAMMO INCL CAD UNI: CPT

## 2017-02-23 PROCEDURE — 76642 ULTRASOUND BREAST LIMITED: CPT

## 2017-03-13 ENCOUNTER — OFFICE VISIT (OUTPATIENT)
Dept: OBGYN CLINIC | Age: 42
End: 2017-03-13

## 2017-03-13 VITALS
HEIGHT: 62 IN | SYSTOLIC BLOOD PRESSURE: 135 MMHG | HEART RATE: 73 BPM | BODY MASS INDEX: 32.39 KG/M2 | DIASTOLIC BLOOD PRESSURE: 79 MMHG | WEIGHT: 176 LBS

## 2017-03-13 DIAGNOSIS — Z09 POSTOP CHECK: Primary | ICD-10-CM

## 2017-03-13 NOTE — LETTER
NOTIFICATION OF RETURN TO WORK / SCHOOL 
 
3/13/2017 10:38 AM 
 
Ms. Plata Alan [address] [place] [phone] Mckenna James To Whom It May Concern: 
 
April C Fernando Prado was under the care of [facility] from 1- to 3- She will be able to return to work/school on 3- with no restrictions. If there are questions or concerns please have the patient contact our office. Sincerely, Cristin Christopher MD

## 2017-05-15 ENCOUNTER — OFFICE VISIT (OUTPATIENT)
Dept: OBGYN CLINIC | Age: 42
End: 2017-05-15

## 2017-05-15 VITALS
DIASTOLIC BLOOD PRESSURE: 86 MMHG | HEART RATE: 83 BPM | SYSTOLIC BLOOD PRESSURE: 135 MMHG | WEIGHT: 174 LBS | HEIGHT: 62 IN | BODY MASS INDEX: 32.02 KG/M2

## 2017-05-15 DIAGNOSIS — N76.0 VAGINITIS AND VULVOVAGINITIS: Primary | ICD-10-CM

## 2017-05-15 RX ORDER — FLUCONAZOLE 150 MG/1
150 TABLET ORAL DAILY
Qty: 1 TAB | Refills: 0 | Status: SHIPPED | OUTPATIENT
Start: 2017-05-15 | End: 2017-05-16

## 2017-05-15 NOTE — PROGRESS NOTES
Subjective:   43 y.o. female complains of clear and thin vaginal discharge for 1 week. She used scented toilet paper and noted the external itching and irritation. She also used paper towel to avoid the toilet paper. Denies abnormal vaginal bleeding or significant pelvic pain or  fever. No UTI symptoms. Denies history of known exposure to STD. Patient's last menstrual period was 01/06/2017. Objective:   She appears well, afebrile. Abdomen: benign, soft, nontender, no masses. Pelvic Exam: VULVA: normal appearing vulva with no masses, tenderness or lesions, VAGINA: normal appearing vagina with normal color and discharge, no lesions, CERVIX: WET MOUNT done - results: hyphae, lactobacilli, surgically absent, UTERUS: surgically absent, vaginal cuff well healed. Microscopic wet-mount exam shows hyphae, lactobacilli. .    Assessment/Plan:   monilia vaginitis  Treatment: OTC yeast cream such as Monistat or Gyne-Lotrimin and abstain from coitus during course of treatment  ROV prn if symptoms persist or worsen. ICD-10-CM ICD-9-CM    1. Vaginitis and vulvovaginitis N76.0 616.10 fluconazole (DIFLUCAN) 150 mg tablet   .

## 2017-05-15 NOTE — MR AVS SNAPSHOT
Visit Information Date & Time Provider Department Dept. Phone Encounter #  
 5/15/2017  5:00 PM Pedro Gates, [facility] [phone] [id number] Follow-up Instructions Return if symptoms worsen or fail to improve. Your Appointments 7/17/2017  8:20 AM  
[place] [address] [address]) Appt Note: Return in about 5 months (around 7/16/2017). [address]  
  
   
 [address] [facility] Upcoming Health Maintenance Date Due INFLUENZA AGE 9 TO ADULT 8/1/2017 PAP AKA CERVICAL CYTOLOGY 12/27/2019 Allergies as of 5/15/2017  Review Complete On: 5/15/2017 By: Pedro Gates MD  
 No Known Allergies Current Immunizations  Reviewed on 8/16/2016 Name Date Hep B Vaccine (Adult) 2/16/2017, 8/22/2016, 7/22/2016 MMR 8/16/2016 TB Skin Test (PPD) Intradermal 7/22/2016 Tdap 7/22/2016 Not reviewed this visit You Were Diagnosed With   
  
 Codes Comments Vaginitis and vulvovaginitis    -  Primary ICD-10-CM: N76.0 ICD-9-CM: 616.10 Vitals BP Pulse Height(growth percentile) Weight(growth percentile) LMP BMI  
 (!) 146/98 88 5' 2\" (1.575 m) 174 lb (78.9 kg) 01/06/2017 31.83 kg/m2 OB Status Smoking Status Hysterectomy Never Smoker BMI and BSA Data Body Mass Index Body Surface Area  
 31.83 kg/m 2 1.86 m 2 Preferred Pharmacy [facility]acy Name Phone [id number], [address] [place] [address] [phone] Your Updated Medication List  
  
   
This list is accurate as of: 5/15/17  5:31 PM.  Always use your most recent med list.  
  
  
  
  
 fluconazole 150 mg tablet Commonly known as:  DIFLUCAN Take 1 Tab by mouth daily for 1 day. Indications: VULVOVAGINAL CANDIDIASIS HYDROcodone-acetaminophen 5-325 mg per tablet Commonly known as:  Erwinerasmo Kennedy Take 1 Tab by mouth every four (4) hours as needed. Max Daily Amount: 6 Tabs. Indications: PAIN  
  
 ibuprofen 800 mg tablet Commonly known as:  MOTRIN Take 1 Tab by mouth every eight (8) hours as needed for Pain. Indications: PAIN  
  
 multivitamin with iron tablet Take 1 Tab by mouth daily. Prescriptions Sent to Pharmacy Refills  
 fluconazole (DIFLUCAN) 150 mg tablet 0 Sig: Take 1 Tab by mouth daily for 1 day. Indications: VULVOVAGINAL CANDIDIASIS Class: Normal  
 Pharmacy: [address] [place] [address], [address] [place] [address], 29  #: [phone] Route: Oral  
  
Follow-up Instructions Return if symptoms worsen or fail to improve. Patient Instructions Avoid scented products in vaginal area. Vaginal Yeast Infection: Care Instructions Your Care Instructions A vaginal yeast infection is caused by too many yeast cells in the vagina. This is common in women of all ages. Itching, vaginal discharge and irritation, and other symptoms can bother you. But yeast infections don't often cause other health problems. Some medicines can increase your risk of getting a yeast infection. These include antibiotics, birth control pills, hormones, and steroids. You may also be more likely to get a yeast infection if you are pregnant, have diabetes, douche, or wear tight clothes. With treatment, most yeast infections get better in 2 to 3 days. Follow-up care is a key part of your treatment and safety. Be sure to make and go to all appointments, and call your doctor if you are having problems. It's also a good idea to know your test results and keep a list of the medicines you take. How can you care for yourself at home? · Take your medicines exactly as prescribed. Call your doctor if you think you are having a problem with your medicine.  
· Ask your doctor about over-the-counter (OTC) medicines for yeast infections. They may cost less than prescription medicines. If you use an OTC treatment, read and follow all instructions on the label. · Do not use tampons while using a vaginal cream or suppository. The tampons can absorb the medicine. Use pads instead. · Wear loose cotton clothing. Do not wear nylon or other fabric that holds body heat and moisture close to the skin. · Try sleeping without underwear. · Do not scratch. Relieve itching with a cold pack or a cool bath. · Do not wash your vaginal area more than once a day. Use plain water or a mild, unscented soap. Air-dry the vaginal area. · Change out of wet swimsuits after swimming. · Do not have sex until you have finished your treatment. · Do not douche. When should you call for help? Call your doctor now or seek immediate medical care if: 
· You have unexpected vaginal bleeding. · You have new or increased pain in your vagina or pelvis. Watch closely for changes in your health, and be sure to contact your doctor if: 
· You have a fever. · You are not getting better after 2 days. · Your symptoms come back after you finish your medicines. Where can you learn more? Go to http://cesia-spenser.info/. Enter Q984 in the search box to learn more about \"Vaginal Yeast Infection: Care Instructions. \" Current as of: October 13, 2016 Content Version: 11.2 © 1691-4281 [employer]. Care instructions adapted under license by [employer] (which disclaims liability or warranty for this information). If you have questions about a medical condition or this instruction, always ask your healthcare professional. [place] [address] any warranty or liability for your use of this information. Introducing [facility] HE[facility] SERVICES! Dear April: 
Thank you for requesting a [employer] account. Our records indicate that you already have an active [employer] account.   You can access your account anytime at [email or web address] Did you know that you can access your hospital and ER discharge instructions at any time in [employer]ubit? You can also review all of your test results from your hospital stay or ER visit. Additional Information If you have questions, please visit the Frequently Asked Questions section of the [employer] website at [email or web address] Remember, [employer] is NOT to be used for urgent needs. For medical emergencies, dial 911. Now available from your iPhone and Android! Please provide this summary of care documentation to your next provider. Your primary care clinician is listed as Patt Goodpasture. If you have any questions after today's visit, please call [phone].

## 2017-05-15 NOTE — PATIENT INSTRUCTIONS
Avoid scented products in vaginal area. Vaginal Yeast Infection: Care Instructions  Your Care Instructions  A vaginal yeast infection is caused by too many yeast cells in the vagina. This is common in women of all ages. Itching, vaginal discharge and irritation, and other symptoms can bother you. But yeast infections don't often cause other health problems. Some medicines can increase your risk of getting a yeast infection. These include antibiotics, birth control pills, hormones, and steroids. You may also be more likely to get a yeast infection if you are pregnant, have diabetes, douche, or wear tight clothes. With treatment, most yeast infections get better in 2 to 3 days. Follow-up care is a key part of your treatment and safety. Be sure to make and go to all appointments, and call your doctor if you are having problems. It's also a good idea to know your test results and keep a list of the medicines you take. How can you care for yourself at home? · Take your medicines exactly as prescribed. Call your doctor if you think you are having a problem with your medicine. · Ask your doctor about over-the-counter (OTC) medicines for yeast infections. They may cost less than prescription medicines. If you use an OTC treatment, read and follow all instructions on the label. · Do not use tampons while using a vaginal cream or suppository. The tampons can absorb the medicine. Use pads instead. · Wear loose cotton clothing. Do not wear nylon or other fabric that holds body heat and moisture close to the skin. · Try sleeping without underwear. · Do not scratch. Relieve itching with a cold pack or a cool bath. · Do not wash your vaginal area more than once a day. Use plain water or a mild, unscented soap. Air-dry the vaginal area. · Change out of wet swimsuits after swimming. · Do not have sex until you have finished your treatment. · Do not douche. When should you call for help?   Call your doctor now or seek immediate medical care if:  · You have unexpected vaginal bleeding. · You have new or increased pain in your vagina or pelvis. Watch closely for changes in your health, and be sure to contact your doctor if:  · You have a fever. · You are not getting better after 2 days. · Your symptoms come back after you finish your medicines. Where can you learn more? Go to http://cesia-spenser.info/. Enter U241 in the search box to learn more about \"Vaginal Yeast Infection: Care Instructions. \"  Current as of: October 13, 2016  Content Version: 11.2  © 5449-3895 Muse. Care instructions adapted under license by [employer] (which disclaims liability or warranty for this information). If you have questions about a medical condition or this instruction, always ask your healthcare professional. [address] any warranty or liability for your use of this information.

## 2017-07-17 ENCOUNTER — OFFICE VISIT (OUTPATIENT)
Dept: FAMILY MEDICINE CLINIC | Age: 42
End: 2017-07-17

## 2017-07-17 ENCOUNTER — HOSPITAL ENCOUNTER (OUTPATIENT)
Dept: LAB | Age: 42
Discharge: HOME | End: 2017-07-17

## 2017-07-17 VITALS
HEIGHT: 62 IN | OXYGEN SATURATION: 98 % | DIASTOLIC BLOOD PRESSURE: 77 MMHG | RESPIRATION RATE: 16 BRPM | HEART RATE: 75 BPM | SYSTOLIC BLOOD PRESSURE: 124 MMHG | BODY MASS INDEX: 30.73 KG/M2 | WEIGHT: 167 LBS | TEMPERATURE: 98.1 F

## 2017-07-17 DIAGNOSIS — D50.0 IRON DEFICIENCY ANEMIA DUE TO CHRONIC BLOOD LOSS: ICD-10-CM

## 2017-07-17 DIAGNOSIS — Z12.39 SCREENING FOR BREAST CANCER: ICD-10-CM

## 2017-07-17 DIAGNOSIS — Z13.220 SCREENING CHOLESTEROL LEVEL: ICD-10-CM

## 2017-07-17 DIAGNOSIS — Z00.00 WELL WOMAN EXAM (NO GYNECOLOGICAL EXAM): Primary | ICD-10-CM

## 2017-07-17 PROCEDURE — 99001 SPECIMEN HANDLING PT-LAB: CPT | Performed by: FAMILY MEDICINE

## 2017-07-17 NOTE — PROGRESS NOTES
1. Have you been to the ER, urgent care clinic since your last visit? Hospitalized since your last visit? No    2. Have you seen or consulted any other health care providers outside of the [address] [place] [address] since your last visit? Include any pap smears or colon screening.  No

## 2017-07-17 NOTE — MR AVS SNAPSHOT
Visit Information Date & Time Provider Department Dept. Phone Encounter #  
 7/17/2017  8:20 AM Aleena Love[address]  [id number] Follow-up Instructions Return in about 1 year (around 7/17/2018) for well woman physical.  as needed if any problems arise. Sophie Heredia Upcoming Health Maintenance Date Due INFLUENZA AGE 9 TO ADULT 8/1/2017 PAP AKA CERVICAL CYTOLOGY 12/27/2019 DTaP/Tdap/Td series (2 - Td) 7/22/2026 Allergies as of 7/17/2017  Review Complete On: 7/17/2017 By: Aleena Love, DO No Known Allergies Current Immunizations  Reviewed on 8/16/2016 Name Date Hep B Vaccine (Adult) 2/16/2017, 8/22/2016, 7/22/2016 MMR 8/16/2016 TB Skin Test (PPD) Intradermal 7/22/2016 Tdap 7/22/2016 Not reviewed this visit You Were Diagnosed With   
  
 Codes Comments Well woman exam (no gynecological exam)    -  Primary ICD-10-CM: Z00.00 ICD-9-CM: V70.0 Iron deficiency anemia due to chronic blood loss     ICD-10-CM: D50.0 ICD-9-CM: 280.0 Screening for breast cancer     ICD-10-CM: Z12.39 
ICD-9-CM: V76.10 Screening cholesterol level     ICD-10-CM: G71.654 ICD-9-CM: V77.91 Vitals BP Pulse Temp Resp Height(growth percentile) Weight(growth percentile) 124/77 (BP 1 Location: Right arm, BP Patient Position: Sitting) 75 98.1 °F (36.7 °C) (Oral) 16 5' 2\" (1.575 m) 167 lb (75.8 kg) LMP SpO2 BMI OB Status Smoking Status 01/06/2017 98% 30.54 kg/m2 Hysterectomy Never Smoker BMI and BSA Data Body Mass Index Body Surface Area 30.54 kg/m 2 1.82 m 2 Preferred Pharmacy Pharmacy Name Phone [id number]- [address] [phone] Your Updated Medication List  
  
   
This list is accurate as of: 7/17/17  8:37 AM.  Always use your most recent med list.  
  
  
  
  
 multivitamin with iron tablet Take 1 Tab by mouth daily. Follow-up Instructions Return in about 1 year (around 7/17/2018) for well woman physical.  as needed if any problems arise. Simone Richmond To-Do List   
 07/17/2017 Lab:  CBC WITH AUTOMATED DIFF   
  
 07/17/2017 Lab:  LIPID PANEL   
  
 07/17/2017 Lab:  METABOLIC PANEL, COMPREHENSIVE   
  
 08/16/2017 Imaging:  [facility] MAMMO BI SCREENING INCL CAD Patient Instructions Well Visit, Ages 25 to 48: Care Instructions Your Care Instructions Physical exams can help you stay healthy. Your doctor has checked your overall health and may have suggested ways to take good care of yourself. He or she also may have recommended tests. At home, you can help prevent illness with healthy eating, regular exercise, and other steps. Follow-up care is a key part of your treatment and safety. Be sure to make and go to all appointments, and call your doctor if you are having problems. It's also a good idea to know your test results and keep a list of the medicines you take. How can you care for yourself at home? · Reach and stay at a healthy weight. This will lower your risk for many problems, such as obesity, diabetes, heart disease, and high blood pressure. · Get at least 30 minutes of physical activity on most days of the week. Walking is a good choice. You also may want to do other activities, such as running, swimming, cycling, or playing tennis or team sports. Discuss any changes in your exercise program with your doctor. · Do not smoke or allow others to smoke around you. If you need help quitting, talk to your doctor about stop-smoking programs and medicines. These can increase your chances of quitting for good. · Talk to your doctor about whether you have any risk factors for sexually transmitted infections (STIs). Having one sex partner (who does not have STIs and does not have sex with anyone else) is a good way to avoid these infections. · Use birth control if you do not want to have children at this time. Talk with your doctor about the choices available and what might be best for you. · Protect your skin from too much sun. When you're outdoors from 10 a.m. to 4 p.m., stay in the shade or cover up with clothing and a hat with a wide brim. Wear sunglasses that block UV rays. Even when it's cloudy, put broad-spectrum sunscreen (SPF 30 or higher) on any exposed skin. · See a dentist one or two times a year for checkups and to have your teeth cleaned. · Wear a seat belt in the car. · Drink alcohol in moderation, if at all. That means no more than 2 drinks a day for men and 1 drink a day for women. Follow your doctor's advice about when to have certain tests. These tests can spot problems early. For everyone · Cholesterol. Have the fat (cholesterol) in your blood tested after age 21. Your doctor will tell you how often to have this done based on your age, family history, or other things that can increase your risk for heart disease. · Blood pressure. Have your blood pressure checked during a routine doctor visit. Your doctor will tell you how often to check your blood pressure based on your age, your blood pressure results, and other factors. · Vision. Talk with your doctor about how often to have a glaucoma test. 
· Diabetes. Ask your doctor whether you should have tests for diabetes. · Colon cancer. Have a test for colon cancer at age 48. You may have one of several tests. If you are younger than 48, you may need a test earlier if you have any risk factors. Risk factors include whether you already had a precancerous polyp removed from your colon or whether your parent, brother, sister, or child has had colon cancer. For women · Breast exam and mammogram. Talk to your doctor about when you should have a clinical breast exam and a mammogram. Medical experts differ on whether and how often women under 50 should have these tests.  Your doctor can help you decide what is right for you. · Pap test and pelvic exam. Begin Pap tests at age 24. A Pap test is the best way to find cervical cancer. The test often is part of a pelvic exam. Ask how often to have this test. 
· Tests for sexually transmitted infections (STIs). Ask whether you should have tests for STIs. You may be at risk if you have sex with more than one person, especially if your partners do not wear condoms. For men · Tests for sexually transmitted infections (STIs). Ask whether you should have tests for STIs. You may be at risk if you have sex with more than one person, especially if you do not wear a condom. · Testicular cancer exam. Ask your doctor whether you should check your testicles regularly. · Prostate exam. Talk to your doctor about whether you should have a blood test (called a PSA test) for prostate cancer. Experts differ on whether and when men should have this test. Some experts suggest it if you are older than 39 and are -American or have a father or brother who got prostate cancer when he was younger than 72. When should you call for help? Watch closely for changes in your health, and be sure to contact your doctor if you have any problems or symptoms that concern you. Where can you learn more? Go to http://cesia-spenser.info/. Enter P072 in the search box to learn more about \"Well Visit, Ages 25 to 48: Care Instructions. \" Current as of: July 19, 2016 Content Version: 11.3 © 7495-2110 [employer]. Care instructions adapted under license by [employer] (which disclaims liability or warranty for this information). If you have questions about a medical condition or this instruction, always ask your healthcare professional. [place] [address] any warranty or liability for your use of this information. Introducing [facility]! Dear April: Thank you for requesting a Perfect Price account. Our records indicate that you already have an active Perfect Price account. You can access your account anytime at [email or web address] Did you know that you can access your hospital and ER discharge instructions at any time in Perfect Price? You can also review all of your test results from your hospital stay or ER visit. Additional Information If you have questions, please visit the Frequently Asked Questions section of the Perfect Price website at [email or web address] Remember, Perfect Price is NOT to be used for urgent needs. For medical emergencies, dial 911. Now available from your iPhone and Android! Please provide this summary of care documentation to your next provider. Your primary care clinician is listed as [facility]. If you have any questions after today's visit, please call [phone].

## 2017-07-17 NOTE — PROGRESS NOTES
Subjective:    Dominguez Lopez is a 43 y.o. female who presents for annual routine checkup. Labs Reviewed: New labs ordered in office today. LMP: Patient's last menstrual period was 2017. Last PAP?: 2017, normal   History of abnormal PAP: None   Family history for GYN cancer: none  Family history breast cancer: none  Mammogram: 08/15/2016  Family history for colon cancer: none  Colonoscopy: none   Flu shot: none    Of note,    Pt is weight lifting, running, and going to the gym on the weekends. Pt denies any abnormal vaginal bleeding or issues with intimacy. Overall pt reports that she is doing well. Current Outpatient Prescriptions   Medication Sig Dispense Refill    multivitamin with iron tablet Take 1 Tab by mouth daily. No Known Allergies    Past Medical History:   Diagnosis Date    Anemia 10/13/2014    Breast lump 08/15/2016    Right braest lump 2 oclock per MD    Fibroids 2016    H/O excision of dermoid cyst 2016       Past Surgical History:   Procedure Laterality Date    HX  SECTION  ,     HX CYST REMOVAL      right ovary    HX OOPHORECTOMY Right 2013    HX TUBAL LIGATION  2001       Family History   Problem Relation Age of Onset    Diabetes Mother     Hypertension Mother     Heart Disease Mother     Heart Disease Father     Diabetes Maternal Grandmother        Social History     Social History    Marital status: SINGLE     Spouse name: N/A    Number of children: N/A    Years of education: N/A     Occupational History    Not on file. Social History Main Topics    Smoking status: Never Smoker    Smokeless tobacco: Never Used    Alcohol use Yes      Comment: occ once a month.  Drug use: No    Sexual activity: Yes     Partners: Male     Birth control/ protection: None     Other Topics Concern    Not on file     Social History Narrative       Review of Systems   Constitutional: Negative for chills and fever. Eyes: Negative for blurred vision. Respiratory: Negative for shortness of breath. Cardiovascular: Negative for chest pain, palpitations and leg swelling. Gastrointestinal: Negative for constipation, diarrhea, nausea and vomiting. Musculoskeletal: Negative for joint pain. Neurological: Negative for headaches. Objective:     Visit Vitals    /77 (BP 1 Location: Right arm, BP Patient Position: Sitting)    Pulse 75    Temp 98.1 °F (36.7 °C) (Oral)    Resp 16    Ht 5' 2\" (1.575 m)    Wt 167 lb (75.8 kg)    LMP 01/06/2017    SpO2 98%    BMI 30.54 kg/m2       Physical Exam   Constitutional: She is oriented to person, place, and time and well-developed, well-nourished, and in no distress. HENT:   Right Ear: Tympanic membrane, external ear and ear canal normal.   Left Ear: Tympanic membrane, external ear and ear canal normal.   Nose: Nose normal.   Mouth/Throat: Oropharynx is clear and moist.   Eyes: Pupils are equal, round, and reactive to light. Neck: Normal range of motion. Neck supple. No thyromegaly present. Cardiovascular: Normal rate, regular rhythm, normal heart sounds and intact distal pulses. No murmur heard. Pulmonary/Chest: Effort normal and breath sounds normal. She has no wheezes. Neurological: She is alert and oriented to person, place, and time. GCS score is 15. Skin: Skin is warm and dry. Vitals reviewed. Assessment/Plan:   well woman  Mammogram - ordered today. pap smear - not needed due to previous hysterectomy  counseled on mammography screening  return annually or prn    ICD-10-CM ICD-9-CM    1. Well woman exam (no gynecological exam) Z00.00 V70.0 LIPID PANEL      METABOLIC PANEL, COMPREHENSIVE   2. Iron deficiency anemia due to chronic blood loss D50.0 280.0 CBC WITH AUTOMATED DIFF   3. Screening for breast cancer Z12.39 V76.10 [facility] MAMMO BI SCREENING INCL CAD   4. Screening cholesterol level Z13.220 V77.91 LIPID PANEL   .   Patient given opportunity to ask questions. Questions answered. Fasting labs ordered in office today. Patient understands plan of care. Follow-up Disposition:  Return in about 1 year (around 7/17/2018) for well woman physical.  as needed if any problems arise. .        Written by Cata Mcqueen, as dictated by Jessica Phillips DO.    I, Dr. Jessica Phillips, confirm that all documentation is accurate.

## 2017-07-17 NOTE — PATIENT INSTRUCTIONS

## 2017-07-18 LAB
ALBUMIN SERPL-MCNC: 4.2 G/DL (ref 3.5–5.5)
ALBUMIN/GLOB SERPL: 1.4 {RATIO} (ref 1.2–2.2)
ALP SERPL-CCNC: 61 IU/L (ref 39–117)
ALT SERPL-CCNC: 23 IU/L (ref 0–32)
AST SERPL-CCNC: 24 IU/L (ref 0–40)
BASOPHILS # BLD AUTO: 0 X10E3/UL (ref 0–0.2)
BASOPHILS NFR BLD AUTO: 1 %
BILIRUB SERPL-MCNC: 0.2 MG/DL (ref 0–1.2)
BUN SERPL-MCNC: 13 MG/DL (ref 6–24)
BUN/CREAT SERPL: 16 (ref 9–23)
CALCIUM SERPL-MCNC: 9.3 MG/DL (ref 8.7–10.2)
CHLORIDE SERPL-SCNC: 99 MMOL/L (ref 96–106)
CHOLEST SERPL-MCNC: 160 MG/DL (ref 100–199)
CO2 SERPL-SCNC: 23 MMOL/L (ref 18–29)
CREAT SERPL-MCNC: 0.79 MG/DL (ref 0.57–1)
EOSINOPHIL # BLD AUTO: 0.2 X10E3/UL (ref 0–0.4)
EOSINOPHIL NFR BLD AUTO: 4 %
ERYTHROCYTE [DISTWIDTH] IN BLOOD BY AUTOMATED COUNT: 14.1 % (ref 12.3–15.4)
GLOBULIN SER CALC-MCNC: 2.9 G/DL (ref 1.5–4.5)
GLUCOSE SERPL-MCNC: 75 MG/DL (ref 65–99)
HCT VFR BLD AUTO: 41 % (ref 34–46.6)
HDLC SERPL-MCNC: 65 MG/DL
HGB BLD-MCNC: 13.6 G/DL (ref 11.1–15.9)
IMM GRANULOCYTES # BLD: 0 X10E3/UL (ref 0–0.1)
IMM GRANULOCYTES NFR BLD: 0 %
INTERPRETATION, 910389: NORMAL
LDLC SERPL CALC-MCNC: 78 MG/DL (ref 0–99)
LYMPHOCYTES # BLD AUTO: 1.6 X10E3/UL (ref 0.7–3.1)
LYMPHOCYTES NFR BLD AUTO: 29 %
MCH RBC QN AUTO: 30.8 PG (ref 26.6–33)
MCHC RBC AUTO-ENTMCNC: 33.2 G/DL (ref 31.5–35.7)
MCV RBC AUTO: 93 FL (ref 79–97)
MONOCYTES # BLD AUTO: 0.5 X10E3/UL (ref 0.1–0.9)
MONOCYTES NFR BLD AUTO: 8 %
NEUTROPHILS # BLD AUTO: 3.3 X10E3/UL (ref 1.4–7)
NEUTROPHILS NFR BLD AUTO: 58 %
PLATELET # BLD AUTO: 270 X10E3/UL (ref 150–379)
POTASSIUM SERPL-SCNC: 4.8 MMOL/L (ref 3.5–5.2)
PROT SERPL-MCNC: 7.1 G/DL (ref 6–8.5)
RBC # BLD AUTO: 4.42 X10E6/UL (ref 3.77–5.28)
SODIUM SERPL-SCNC: 138 MMOL/L (ref 134–144)
TRIGL SERPL-MCNC: 83 MG/DL (ref 0–149)
VLDLC SERPL CALC-MCNC: 17 MG/DL (ref 5–40)
WBC # BLD AUTO: 5.6 X10E3/UL (ref 3.4–10.8)

## 2017-08-17 ENCOUNTER — HOSPITAL ENCOUNTER (OUTPATIENT)
Dept: MAMMOGRAPHY | Age: 42
Discharge: HOME | End: 2017-08-17
Attending: FAMILY MEDICINE
Payer: COMMERCIAL

## 2017-08-17 DIAGNOSIS — Z12.39 SCREENING FOR BREAST CANCER: ICD-10-CM

## 2017-08-17 PROCEDURE — 77067 SCR MAMMO BI INCL CAD: CPT

## 2017-08-21 ENCOUNTER — OFFICE VISIT (OUTPATIENT)
Dept: OBGYN CLINIC | Age: 42
End: 2017-08-21

## 2017-08-21 VITALS
HEIGHT: 62 IN | WEIGHT: 165 LBS | BODY MASS INDEX: 30.36 KG/M2 | DIASTOLIC BLOOD PRESSURE: 66 MMHG | SYSTOLIC BLOOD PRESSURE: 119 MMHG | HEART RATE: 65 BPM

## 2017-08-21 DIAGNOSIS — R10.2 PELVIC PAIN IN FEMALE: Primary | ICD-10-CM

## 2017-08-21 NOTE — PATIENT INSTRUCTIONS
Constipation: Care Instructions  Your Care Instructions  Constipation means that you have a hard time passing stools (bowel movements). People pass stools from 3 times a day to once every 3 days. What is normal for you may be different. Constipation may occur with pain in the rectum and cramping. The pain may get worse when you try to pass stools. Sometimes there are small amounts of bright red blood on toilet paper or the surface of stools. This is because of enlarged veins near the rectum (hemorrhoids). A few changes in your diet and lifestyle may help you avoid ongoing constipation. Your doctor may also prescribe medicine to help loosen your stool. Some medicines can cause constipation. These include pain medicines and antidepressants. Tell your doctor about all the medicines you take. Your doctor may want to make a medicine change to ease your symptoms. Follow-up care is a key part of your treatment and safety. Be sure to make and go to all appointments, and call your doctor if you are having problems. It's also a good idea to know your test results and keep a list of the medicines you take. How can you care for yourself at home? · Drink plenty of fluids, enough so that your urine is light yellow or clear like water. If you have kidney, heart, or liver disease and have to limit fluids, talk with your doctor before you increase the amount of fluids you drink. · Include high-fiber foods in your diet each day. These include fruits, vegetables, beans, and whole grains. · Get at least 30 minutes of exercise on most days of the week. Walking is a good choice. You also may want to do other activities, such as running, swimming, cycling, or playing tennis or team sports. · Take a fiber supplement, such as Citrucel or Metamucil, every day. Read and follow all instructions on the label. · Schedule time each day for a bowel movement. A daily routine may help.  Take your time having your bowel movement. · Support your feet with a small step stool when you sit on the toilet. This helps flex your hips and places your pelvis in a squatting position. · Your doctor may recommend an over-the-counter laxative to relieve your constipation. Examples are Milk of Magnesia and MiraLax. Read and follow all instructions on the label. Do not use laxatives on a long-term basis. When should you call for help? Call your doctor now or seek immediate medical care if:  · You have new or worse belly pain. · You have new or worse nausea or vomiting. · You have blood in your stools. Watch closely for changes in your health, and be sure to contact your doctor if:  · Your constipation is getting worse. · You do not get better as expected. Where can you learn more? Go to http://cesia-spenser.info/. Enter [phone] in the search box to learn more about \"Constipation: Care Instructions. \"  Current as of: March 20, 2017  Content Version: 11.3  © 2839-3319 [employer]. Care instructions adapted under license by [employer] (which disclaims liability or warranty for this information). If you have questions about a medical condition or this instruction, always ask your healthcare professional. [address] any warranty or liability for your use of this information. Pelvic Pain: Care Instructions  Your Care Instructions    Pelvic pain, or pain in the lower belly, can have many causes. Often pelvic pain is not serious and gets better in a few days. If your pain continues or gets worse, you may need tests and treatment. Tell your doctor about any new symptoms. These may be signs of a serious problem. Follow-up care is a key part of your treatment and safety. Be sure to make and go to all appointments, and call your doctor if you are having problems. It's also a good idea to know your test results and keep a list of the medicines you take.   How can you care for yourself at home? · Rest until you feel better. Lie down, and raise your legs by placing a pillow under your knees. · Drink plenty of fluids. You may find that small, frequent sips are easier on your stomach than if you drink a lot at once. Avoid drinks with carbonation or caffeine, such as soda pop, tea, or coffee. · Try eating several small meals instead of 2 or 3 large ones. Eat mild foods, such as rice, dry toast or crackers, bananas, and applesauce. Avoid fatty and spicy foods, other fruits, and alcohol until 48 hours after your symptoms have gone away. · Take an over-the-counter pain medicine, such as acetaminophen (Tylenol), ibuprofen (Advil, Motrin), or naproxen (Aleve). Read and follow all instructions on the label. · Do not take two or more pain medicines at the same time unless the doctor told you to. Many pain medicines have acetaminophen, which is Tylenol. Too much acetaminophen (Tylenol) can be harmful. · You can put a heating pad, a warm cloth, or moist heat on your belly to relieve pain. When should you call for help? Call 911 anytime you think you may need emergency care. For example, call if:  · You passed out (lost consciousness). Call your doctor now or seek immediate medical care if:  · Your pain is getting worse. · Your pain becomes focused in one area of your belly. · You have severe vaginal bleeding. Severe means that you are soaking through your usual pads or tampons every hour for 2 or more hours and passing clots of blood. · You have new symptoms such as fever, urinary problems or unexpected vaginal bleeding. · You are dizzy or lightheaded, or you feel like you may faint. Watch closely for changes in your health, and be sure to contact your doctor if:  · You do not get better as expected. Where can you learn more? Go to http://cesia-spenser.info/. Enter [phone] in the search box to learn more about \"Pelvic Pain: Care Instructions. \"  Current as of: October 13, 2016  Content Version: 11.3  © 0448-9587 [employer]. Care instructions adapted under license by [employer] (which disclaims liability or warranty for this information). If you have questions about a medical condition or this instruction, always ask your healthcare professional. [address] any warranty or liability for your use of this information.

## 2018-01-25 ENCOUNTER — HOSPITAL ENCOUNTER (OUTPATIENT)
Dept: ULTRASOUND IMAGING | Age: 43
Discharge: HOME | End: 2018-01-25
Attending: OBSTETRICS & GYNECOLOGY
Payer: COMMERCIAL

## 2018-01-25 DIAGNOSIS — R10.2 PELVIC PAIN IN FEMALE: ICD-10-CM

## 2018-01-25 PROCEDURE — 76830 TRANSVAGINAL US NON-OB: CPT

## 2018-01-31 ENCOUNTER — OFFICE VISIT (OUTPATIENT)
Dept: OBGYN CLINIC | Age: 43
End: 2018-01-31

## 2018-01-31 VITALS
BODY MASS INDEX: 30.36 KG/M2 | HEART RATE: 68 BPM | WEIGHT: 165 LBS | HEIGHT: 62 IN | SYSTOLIC BLOOD PRESSURE: 117 MMHG | DIASTOLIC BLOOD PRESSURE: 62 MMHG

## 2018-01-31 DIAGNOSIS — N83.202 HEMORRHAGIC CYST OF LEFT OVARY: ICD-10-CM

## 2018-01-31 DIAGNOSIS — R30.9 PAIN PASSING URINE: Primary | ICD-10-CM

## 2018-01-31 LAB
BILIRUB UR QL STRIP: NEGATIVE
GLUCOSE UR-MCNC: NEGATIVE MG/DL
KETONES P FAST UR STRIP-MCNC: NEGATIVE MG/DL
PH UR STRIP: 6 [PH] (ref 4.6–8)
PROT UR QL STRIP: NEGATIVE
SP GR UR STRIP: 1.02 (ref 1–1.03)
UA UROBILINOGEN AMB POC: NORMAL (ref 0.2–1)
URINALYSIS CLARITY POC: CLEAR
URINALYSIS COLOR POC: YELLOW
URINE BLOOD POC: NEGATIVE
URINE LEUKOCYTES POC: NEGATIVE
URINE NITRITES POC: NEGATIVE

## 2018-01-31 RX ORDER — CIPROFLOXACIN 500 MG/1
500 TABLET ORAL 2 TIMES DAILY
Qty: 6 TAB | Refills: 0 | Status: SHIPPED | OUTPATIENT
Start: 2018-01-31 | End: 2018-02-03

## 2018-01-31 NOTE — MR AVS SNAPSHOT
[address] [facility] 83 [phone] Patient: Jenny Osborne MRN: [id number] [facility]:[birth year] Visit Information Date & Time Provider Department Dept. Phone Encounter #  
 1/31/2018  4:45 PM Aaliyah Aj Evaristo [place]lig[place] [phone] [id number] Your Appointments 2/5/2018  1:45 PM  
Office Visit with Aaliyah Aj MD  
[address] [place] [address] ([address] [place] [address]) Appt Note: discuss ultrasound results per pt  
 [facility] 83 [phone]  
[phone]  
  
   
 [facility] [address] [phone] Upcoming Health Maintenance Date Due Influenza Age 5 to Adult 8/1/2017 PAP AKA CERVICAL CYTOLOGY 12/27/2019 Allergies as of 1/31/2018  Review Complete On: 1/31/2018 By: Aaliyah Aj MD  
 No Known Allergies Current Immunizations  Reviewed on 8/16/2016 Name Date Hep B Vaccine (Adult) 2/16/2017, 8/22/2016, 7/22/2016 MMR 8/16/2016 TB Skin Test (PPD) Intradermal 7/22/2016 Tdap 7/22/2016 Not reviewed this visit You Were Diagnosed With   
  
 Codes Comments Pain passing urine    -  Primary ICD-10-CM: R30.9 ICD-9-CM: 788.1 Hemorrhagic cyst of left ovary     ICD-10-CM: O20.710 ICD-9-CM: 620.2 Vitals BP Pulse Height(growth percentile) Weight(growth percentile) LMP BMI  
 117/62 68 5' 2\" (1.575 m) 165 lb (74.8 kg) 01/06/2017 30.18 kg/m2 OB Status Smoking Status Hysterectomy Never Smoker BMI and BSA Data Body Mass Index Body Surface Area  
 30.18 kg/m 2 1.81 m 2 Preferred Pharmacy Pharmacy Name Phone [id number]- [address] [phone] Your Updated Medication List  
  
   
This list is accurate as of: 1/31/18  4:47 PM.  Always use your most recent med list.  
  
  
  
  
 ciprofloxacin HCl 500 mg tablet Commonly known as:  CIPRO Take 1 Tab by mouth two (2) times a day for 3 days. Indications: BACTERIAL URINARY TRACT INFECTION  
  
 multivitamin with iron tablet Take 1 Tab by mouth daily. Prescriptions Sent to Pharmacy Refills  
 ciprofloxacin HCl (CIPRO) 500 mg tablet 0 Sig: Take 1 Tab by mouth two (2) times a day for 3 days. Indications: BACTERIAL URINARY TRACT INFECTION Class: Normal  
 Pharmacy: [address] [place] [address], [address] [facility]  #: [phone] Route: Oral  
  
We Performed the Following AMB POC URINALYSIS DIP STICK AUTO W/ MICRO [18660 CPT(R)] To-Do List   
 01/31/2018 Microbiology:  CULTURE, URINE   
  
 03/21/2018 Imaging:  US PELV NON OB W TV Introducing [address]! Dear April: 
Thank you for requesting a SpotOn account. Our records indicate that you already have an active SpotOn account. You can access your account anytime at [email or web address] Did you know that you can access your hospital and ER discharge instructions at any time in SpotOn? You can also review all of your test results from your hospital stay or ER visit. Additional Information If you have questions, please visit the Frequently Asked Questions section of the SpotOn website at [email or web address] Remember, SpotOn is NOT to be used for urgent needs. For medical emergencies, dial 911. Now available from your iPhone and Android! Please provide this summary of care documentation to your next provider. Your primary care clinician is listed as Ayleen Salcido. If you have any questions after today's visit, please call [phone].

## 2018-02-02 LAB — BACTERIA UR CULT: NO GROWTH

## 2018-02-06 NOTE — PROGRESS NOTES
38 y/o well known to me presents to the office for urinary pain. She states when she voids, it is painful for the past 1-2 weeks. She denies any concern for STD, but is concerned about non-resolution of a hemorrhagic cyst. She notes she intermittently has pain on her side. She was previously diagnosed with left hemorrhagic cyst.    Visit Vitals    /62    Pulse 68    Ht 5' 2\" (1.575 m)    Wt 165 lb (74.8 kg)    LMP 01/06/2017    BMI 30.18 kg/m2     Gen: A&OX3, NAD  Exam deferred    Ultrasound:    Abnormal appearance of the left ovary with a thick walled complex,  presumably hemorrhagic cysts largely replacing the ovary, ovary measures overall  5.6 x 5.1 x 5.3 cm. The dominant hemorrhagic cyst measures 4.0 x 4.3 x 3.6 cm. Left ovary also contains a simple appearing adjacent cyst 2.8 x 2.0 x 1.5 cm.     2. There is a moderate to large amount of minimally complex fluid within the  left adnexa and pelvic cul-de-sac.      3.  Small echogenic reflectors-debris are also noted within the partially  visualized urinary bladder. Consider correlation with urinalysis. U/A:    A/P: UTI- Rx sent. Noted left ovarian cyst. Discussed usual resolution. Pain is minimal at this point. Will re-tacho lute in 3 months. The plan of care has been discussed with the patient. She has been given the opportunity to ask questions, which seem to have been answered satisfactorily.

## 2018-03-08 ENCOUNTER — HOSPITAL ENCOUNTER (EMERGENCY)
Age: 43
Discharge: HOME | End: 2018-03-08
Attending: EMERGENCY MEDICINE
Payer: COMMERCIAL

## 2018-03-08 VITALS
OXYGEN SATURATION: 100 % | WEIGHT: 170 LBS | RESPIRATION RATE: 14 BRPM | TEMPERATURE: 98.2 F | DIASTOLIC BLOOD PRESSURE: 99 MMHG | HEART RATE: 82 BPM | BODY MASS INDEX: 31.09 KG/M2 | SYSTOLIC BLOOD PRESSURE: 149 MMHG

## 2018-03-08 DIAGNOSIS — R09.81 NASAL CONGESTION: ICD-10-CM

## 2018-03-08 DIAGNOSIS — J02.9 PHARYNGITIS, UNSPECIFIED ETIOLOGY: Primary | ICD-10-CM

## 2018-03-08 PROCEDURE — 99283 EMERGENCY DEPT VISIT LOW MDM: CPT

## 2018-03-08 PROCEDURE — 74011250637 HC RX REV CODE- 250/637: Performed by: EMERGENCY MEDICINE

## 2018-03-08 RX ORDER — DEXAMETHASONE SODIUM PHOSPHATE 4 MG/ML
10 INJECTION, SOLUTION INTRA-ARTICULAR; INTRALESIONAL; INTRAMUSCULAR; INTRAVENOUS; SOFT TISSUE
Status: COMPLETED | OUTPATIENT
Start: 2018-03-08 | End: 2018-03-08

## 2018-03-08 RX ADMIN — DEXAMETHASONE SODIUM PHOSPHATE 10 MG: 4 INJECTION, SOLUTION INTRAMUSCULAR; INTRAVENOUS at 04:07

## 2018-03-08 NOTE — ED PROVIDER NOTES
EMERGENCY DEPARTMENT HISTORY AND PHYSICAL EXAM    4:02 AM      Date: 3/8/2018  Patient Name: Shin Dodge    History of Presenting Illness     Chief Complaint   Patient presents with    Sore Throat    Ear Pain         History Provided By: Patient    Chief Complaint: Sore throat, cough, right ear pain, nasal congestion  Duration:  sore throat and cough onset two days ago and right ear pain and nasal congestion onset today  Timing:  Worsening  Location: Chest, nasal sinus  Quality: N/A  Severity: N/A  Modifying Factors: No relief with sudafed, sharri perls, BC powder, and Tylenol. Associated Symptoms: denies CP, abdominal pain, nausea, and emesis      Additional History (Context): April FRANSISCA Jordan is a 37 y.o. female with anemia who presents to the ED c/o sore throat and cough onset two days ago and right ear pain and nasal congestion onset today. Pt states she went to an urgent care center two days ago following the onset of sore throat and cough; pt produced a negative strep and flu test at the visit and was prescribed sudafed and tessalon perls that have not provided relief to the pt's sx. Pt has taken BC powder and Tylenol which have also not provided relief to her sx. Pt denies CP, abdominal pain, nausea, and emesis. PCP: Lynda Kennedy, DO    Current Outpatient Prescriptions   Medication Sig Dispense Refill    multivitamin with iron tablet Take 1 Tab by mouth daily.          Past History     Past Medical History:  Past Medical History:   Diagnosis Date    Anemia 10/13/2014    Fibroids 2016    H/O excision of dermoid cyst 2016       Past Surgical History:  Past Surgical History:   Procedure Laterality Date    HX  SECTION  2001    HX CYST REMOVAL      right ovary    HX OOPHORECTOMY Right 2013    HX TUBAL LIGATION  2001       Family History:  Family History   Problem Relation Age of Onset    Diabetes Mother     Hypertension Mother     Heart Disease Mother    Scott Benigno Heart Disease Father     Diabetes Maternal Grandmother        Social History:  Social History   Substance Use Topics    Smoking status: Never Smoker    Smokeless tobacco: Never Used    Alcohol use Yes      Comment: occ once a month. Allergies:  No Known Allergies      Review of Systems     Review of Systems   Constitutional: Negative for chills. HENT: Positive for congestion, ear pain (RIGHT) and sore throat. Respiratory: Positive for cough. Negative for shortness of breath. Cardiovascular: Negative for chest pain. Gastrointestinal: Negative for abdominal pain, diarrhea, nausea and vomiting. Genitourinary: Negative for dysuria. Musculoskeletal: Negative for back pain. Skin: Negative for rash. Neurological: Negative for dizziness and headaches. All other systems reviewed and are negative. Physical Exam     Visit Vitals    BP (!) 149/99 (BP 1 Location: Right arm, BP Patient Position: At rest)    Pulse 82    Temp 98.2 °F (36.8 °C)    Resp 14    Wt 77.1 kg (170 lb)    LMP 01/06/2017    SpO2 100%    BMI 31.09 kg/m2       Physical Exam   Constitutional: She is oriented to person, place, and time. She appears well-developed and well-nourished. Non-toxic appearance. She does not appear ill. No distress. HENT:   Head: Normocephalic and atraumatic. Right Ear: Tympanic membrane normal.   Left Ear: Tympanic membrane normal.   Mouth/Throat: Uvula is midline. Posterior oropharyngeal erythema present. No oropharyngeal exudate or tonsillar abscesses. No oral swelling or abscess   Eyes: Conjunctivae are normal.   Cardiovascular: Normal rate, regular rhythm, normal heart sounds and intact distal pulses. Pulmonary/Chest: Effort normal and breath sounds normal. No respiratory distress. She has no wheezes. She has no rales. Abdominal: Soft. She exhibits no distension. There is no tenderness. There is no rebound and no guarding.    Neurological: She is alert and oriented to person, place, and time. Skin: Skin is warm and dry. Psychiatric: She has a normal mood and affect. Nursing note and vitals reviewed. Diagnostic Study Results     Labs -  No results found for this or any previous visit (from the past 12 hour(s)). Radiologic Studies -   No orders to display         Medical Decision Making   I am the first provider for this patient. I reviewed the vital signs, available nursing notes, past medical history, past surgical history, family history and social history. Vital Signs-Reviewed the patient's vital signs. Pulse Oximetry Analysis -  100% on room air     Cardiac Monitor:  Rate: 82  Rhythm:  Normal Sinus Rhythm     Records Reviewed: Nursing Notes and Old Medical Records (Time of Review: 4:02 AM)    ED Course: Progress Notes, Reevaluation, and Consults:    Provider Notes (Medical Decision Making): Pt with URI symptoms. Flu and strep from urgent care negative. Exam reassuring with no oral exudate or swelling. Symptoms are likely viral. Do not feel she needs repeat strep swab today. Lungs clear and cough likely from post nasal drainage. Agreeable to decadron PO for inflammation. Discussed treatment plan for continued supportive care, need for follow up and return precautions. Comfortable with plan for discharge. Diagnosis     Clinical Impression:   1. Pharyngitis, unspecified etiology    2. Nasal congestion        Disposition: discharge      Follow-up Information     Follow up With Details Comments [facility] [address], DO In 3 days  [address] [address] [address] [phone]      [facility] EMERGENCY DEPT  If symptoms worsen [address]  [phone]           Discharge Medication List as of 3/8/2018  3:56 AM      CONTINUE these medications which have NOT CHANGED    Details   multivitamin with iron tablet Take 1 Tab by mouth daily. , Historical Med _______________________________    Attestations:  Scribe [address] [place] [address] acting as a scribe for and in the presence of Dirk Can MD      March 08, 2018 at 4:02 AM       Provider Attestation:      I personally performed the services described in the documentation, reviewed the documentation, as recorded by the scribe in my presence, and it accurately and completely records my words and actions.  March 08, 2018 at 4:02 AM - Dirk Can MD    _______________________________

## 2018-03-08 NOTE — LETTER
[facility] EMERGENCY DEPT 
[address] [place] [id number] [phone] Work/School Note Date: 3/8/2018 To Whom It May concern: 
 
Kanwal Barth was seen and treated today in the emergency room by the following provider(s): 
Attending Provider: Mamta Celestin MD. Kanwal Barth may return to work on 3.12.18. Sincerely, Yadira Hernandez RN

## 2018-03-13 ENCOUNTER — OFFICE VISIT (OUTPATIENT)
Dept: FAMILY MEDICINE CLINIC | Age: 43
End: 2018-03-13

## 2018-03-13 VITALS
SYSTOLIC BLOOD PRESSURE: 146 MMHG | HEART RATE: 81 BPM | OXYGEN SATURATION: 98 % | BODY MASS INDEX: 33.13 KG/M2 | HEIGHT: 62 IN | WEIGHT: 180 LBS | TEMPERATURE: 96.3 F | DIASTOLIC BLOOD PRESSURE: 98 MMHG | RESPIRATION RATE: 18 BRPM

## 2018-03-13 DIAGNOSIS — J02.0 STREP PHARYNGITIS: ICD-10-CM

## 2018-03-13 DIAGNOSIS — R03.0 ELEVATED BLOOD PRESSURE READING: ICD-10-CM

## 2018-03-13 DIAGNOSIS — J02.9 SORE THROAT: Primary | ICD-10-CM

## 2018-03-13 LAB
S PYO AG THROAT QL: POSITIVE
VALID INTERNAL CONTROL?: YES

## 2018-03-13 RX ORDER — AMOXICILLIN 500 MG/1
500 CAPSULE ORAL 3 TIMES DAILY
Qty: 30 CAP | Refills: 0 | Status: SHIPPED | OUTPATIENT
Start: 2018-03-13 | End: 2018-03-23

## 2018-03-13 RX ORDER — OXYCODONE AND ACETAMINOPHEN 5; 325 MG/1; MG/1
1 TABLET ORAL
Qty: 10 TAB | Refills: 0 | Status: SHIPPED | OUTPATIENT
Start: 2018-03-13 | End: 2018-12-18 | Stop reason: ALTCHOICE

## 2018-03-13 NOTE — MR AVS SNAPSHOT
[address] [address] 
[phone] Patient: Vince Steven MRN: [id number] UQR:3/90/5393 Visit Information Date & Time Provider Department Dept. Phone Encounter #  
 3/13/2018 11:00  [address] [place] [address]  [id number] Follow-up Instructions Return in about 2 weeks (around 3/27/2018). Upcoming Health Maintenance Date Due Influenza Age 5 to Adult 8/1/2017 PAP AKA CERVICAL CYTOLOGY 12/27/2019 DTaP/Tdap/Td series (2 - Td) 7/22/2026 Allergies as of 3/13/2018  Review Complete On: 3/13/2018 By: [address] [place] [address], DO No Known Allergies Current Immunizations  Reviewed on 8/16/2016 Name Date Hep B Vaccine (Adult) 2/16/2017, 8/22/2016, 7/22/2016 MMR 8/16/2016 TB Skin Test (PPD) Intradermal 7/22/2016 Tdap 7/22/2016 Not reviewed this visit You Were Diagnosed With   
  
 Codes Comments Sore throat    -  Primary ICD-10-CM: J02.9 ICD-9-CM: 751 Strep pharyngitis     ICD-10-CM: J02.0 ICD-9-CM: 034.0 Elevated blood pressure reading     ICD-10-CM: R03.0 ICD-9-CM: 796.2 Vitals BP Pulse Temp Resp Height(growth percentile) Weight(growth percentile) (!) 146/98 (BP 1 Location: Right arm, BP Patient Position: Sitting) 81 96.3 °F (35.7 °C) (Oral) 18 5' 2\" (1.575 m) 180 lb (81.6 kg) LMP SpO2 BMI OB Status Smoking Status 01/06/2017 98% 32.92 kg/m2 Hysterectomy Never Smoker BMI and BSA Data Body Mass Index Body Surface Area  
 32.92 kg/m 2 1.89 m 2 Preferred Pharmacy Pharmacy Name Phone [id number] [phone], [address] [place] [address] [phone] Your Updated Medication List  
  
   
This list is accurate as of 3/13/18 12:16 PM.  Always use your most recent med list.  
  
  
  
  
 amoxicillin 500 mg capsule Commonly known as:  AMOXIL Take 1 Cap by mouth three (3) times daily for 10 days. multivitamin with iron tablet Take 1 Tab by mouth daily. oxyCODONE-acetaminophen 5-325 mg per tablet Commonly known as:  PERCOCET Take 1 Tab by mouth every four (4) hours as needed for Pain. Max Daily Amount: 6 Tabs. Prescriptions Printed Refills  
 oxyCODONE-acetaminophen (PERCOCET) 5-325 mg per tablet 0 Sig: Take 1 Tab by mouth every four (4) hours as needed for Pain. Max Daily Amount: 6 Tabs. Class: Print Route: Oral  
  
Prescriptions Sent to Pharmacy Refills  
 amoxicillin (AMOXIL) 500 mg capsule 0 Sig: Take 1 Cap by mouth three (3) times daily for 10 days. Class: Normal  
 Pharmacy: [address] [place] [address], [address] [place] [address], 29  #: [phone] Route: Oral  
  
We Performed the Following AMB POC RAPID STREP A [64389 CPT(R)] Follow-up Instructions Return in about 2 weeks (around 3/27/2018). To-Do List   
 03/21/2018 12:30 PM  
  Appointment with [address] [place] [address] at [facility] ([phone]) OUTSIDE FILMS  - Any outside films related to the study being scheduled should be brought with you on the day of the exam.  If this cannot be done there may be a delay in the reading of the study. MEDICATIONS  - Patient must bring a complete list of all medications currently taking to include prescriptions, over-the-counter meds, herbals, vitamins & any dietary supplements  GENERAL -Patient must drink 32 ounces of water 1 hour prior to the exam.  
  
  
Patient Instructions Strep Throat: Care Instructions Your Care Instructions Strep throat is a bacterial infection that causes sudden, severe sore throat and fever. Strep throat, which is caused by bacteria called streptococcus, is treated with antibiotics. Sometimes a strep test is necessary to tell if the sore throat is caused by strep bacteria. Treatment can help ease symptoms and may prevent future problems. Follow-up care is a key part of your treatment and safety. Be sure to make and go to all appointments, and call your doctor if you are having problems. It's also a good idea to know your test results and keep a list of the medicines you take. How can you care for yourself at home? · Take your antibiotics as directed. Do not stop taking them just because you feel better. You need to take the full course of antibiotics. · Strep throat can spread to others until 24 hours after you begin taking antibiotics. During this time, you should avoid contact with other people at work or home, especially infants and children. Do not sneeze or cough on others, and wash your hands often. Keep your drinking glass and eating utensils separate from those of others, and wash these items well in hot, soapy water. · Gargle with warm salt water at least once each hour to help reduce swelling and make your throat feel better. Use 1 teaspoon of salt mixed in 8 fluid ounces of warm water. · Take an over-the-counter pain medication, such as acetaminophen (Tylenol), ibuprofen (Advil, Motrin), or naproxen (Aleve). Read and follow all instructions on the label. · Try an over-the-counter anesthetic throat spray or throat lozenges, which may help relieve throat pain. · Drink plenty of fluids. Fluids may help soothe an irritated throat. Hot fluids, such as tea or soup, may help your throat feel better. · Eat soft solids and drink plenty of clear liquids. Flavored ice pops, ice cream, scrambled eggs, sherbet, and gelatin dessert (such as Jell-O) may also soothe the throat. · Get lots of rest. 
· Do not smoke, and avoid secondhand smoke. If you need help quitting, talk to your doctor about stop-smoking programs and medicines. These can increase your chances of quitting for good.  
· Use a vaporizer or humidifier to add moisture to the air in your bedroom. Follow the directions for cleaning the machine. When should you call for help? Call your doctor now or seek immediate medical care if: 
? · You have a new or higher fever. ? · You have a fever with a stiff neck or severe headache. ? · You have new or worse trouble swallowing. ? · Your sore throat gets much worse on one side. ? · Your pain becomes much worse on one side of your throat. ? Watch closely for changes in your health, and be sure to contact your doctor if: 
? · You are not getting better after 2 days (48 hours). ? · You do not get better as expected. Where can you learn more? Go to http://cesia-spenser.info/. Enter K625 in the search box to learn more about \"Strep Throat: Care Instructions. \" Current as of: May 12, 2017 Content Version: 11.4 © 7676-5096 [employer]. Care instructions adapted under license by [employer] (which disclaims liability or warranty for this information). If you have questions about a medical condition or this instruction, always ask your healthcare professional. [place] [address] any warranty or liability for your use of this information. Introducing [facility]! Dear April: 
Thank you for requesting a [email or web address] account. Our records indicate that you already have an active [email or web address] account. You can access your account anytime at [email or web address] Did you know that you can access your hospital and ER discharge instructions at any time in [email or web address]? You can also review all of your test results from your hospital stay or ER visit. Additional Information If you have questions, please visit the Frequently Asked Questions section of the [email or web address] website at [email or web address] Remember, [email or web address] is NOT to be used for urgent needs. For medical emergencies, dial 911. Now available from your iPhone and Android! Please provide this summary of care documentation to your next provider. Your primary care clinician is listed as Olivia Ortiz. If you have any questions after today's visit, please call [phone].

## 2018-03-13 NOTE — PROGRESS NOTES
1. Have you been to the ER, urgent care clinic since your last visit? Hospitalized since your last visit? NO    2. Have you seen or consulted any other health care providers outside of the [address] [place] [address] since your last visit? Include any pap smears or colon screening. NO            Patient was seen in [facility] on [facility] for complaints of sore throat. Complaints today are the same. Symptoms have lasted a week. Patient complaining of frequent headaches daily.

## 2018-03-13 NOTE — LETTER
NOTIFICATION RETURN TO WORK / SCHOOL 
 
3/13/2018 12:06 PM 
 
[facility] Yohan Bedoya [address] [facility] [phone] To Whom It May Concern: 
 
Kanwal Barakatjulián Stephen is currently under the care of [address] seen in office 03/13/18 She will return to work/school on: March 15, 2018 If there are questions or concerns please have the patient contact our office.  
 
 
 
Sincerely, 
 
 
Beecher Hashimoto, DO

## 2018-03-13 NOTE — PROGRESS NOTES
Subjective:     HPI:   Saman Mario is a 37 y.o. female who presents to the office complaining of sore throat and dry cough. Sore throat: Pt complains of sore throat and dry cough for past 6 days. She has been to [facility] ED, urgent care, and [facility] ED for same symptoms and was treated with Sudafed and prescribed Tessalon Perles. She has also tried Benadryl, Sudafed, Tessalon, pearls,  BC powder, Motrin, and Chloraseptic w/o any improvement in her symptoms. Pt states that she is unable to sleep due to pain. Elevated Blood Pressure: Pt's BP in office today is 145/98. Pt reports that she recently wakes up with severe headaches in the morning. Pt has family history of hypertension; mother and father. Current Outpatient Prescriptions   Medication Sig Dispense Refill    amoxicillin (AMOXIL) 500 mg capsule Take 1 Cap by mouth three (3) times daily for 10 days. 30 Cap 0    oxyCODONE-acetaminophen (PERCOCET) 5-325 mg per tablet Take 1 Tab by mouth every four (4) hours as needed for Pain. Max Daily Amount: 6 Tabs. 10 Tab 0    multivitamin with iron tablet Take 1 Tab by mouth daily. No Known Allergies    Past Medical History:   Diagnosis Date    Anemia 10/13/2014    Fibroids 2016    H/O excision of dermoid cyst 2016        Past Surgical History:   Procedure Laterality Date    HX  SECTION  ,     HX CYST REMOVAL      right ovary    HX OOPHORECTOMY Right 2013    HX TUBAL LIGATION  2001       Family History   Problem Relation Age of Onset    Diabetes Mother     Hypertension Mother     Heart Disease Mother     Heart Disease Father     Diabetes Maternal Grandmother        Social History     Social History    Marital status: SINGLE     Spouse name: N/A    Number of children: N/A    Years of education: N/A     Occupational History    Not on file.      Social History Main Topics    Smoking status: Never Smoker    Smokeless tobacco: Never Used   [employer] Alcohol use Yes      Comment: occ once a month.  Drug use: No    Sexual activity: Yes     Partners: Male     Birth control/ protection: None     Other Topics Concern    Not on file     Social History Narrative       REVIEW OF SYSTEM:  Review of Systems   Constitutional: Negative for chills and fever. HENT: Positive for sore throat. Eyes: Negative for blurred vision. Respiratory: Positive for cough. Negative for sputum production and shortness of breath. Cardiovascular: Negative for chest pain, palpitations and leg swelling. Gastrointestinal: Negative for constipation, diarrhea, nausea and vomiting. Musculoskeletal: Negative for joint pain. Neurological: Positive for headaches. Objective:     Visit Vitals    BP (!) 146/98 (BP 1 Location: Right arm, BP Patient Position: Sitting)    Pulse 81    Temp 96.3 °F (35.7 °C) (Oral)    Resp 18    Ht 5' 2\" (1.575 m)    Wt 180 lb (81.6 kg)    LMP 01/06/2017    SpO2 98%    BMI 32.92 kg/m2       PHYSICAL EXAM:  Physical Exam   Constitutional: She is oriented to person, place, and time and well-developed, well-nourished, and in no distress. HENT:   Right Ear: Tympanic membrane, external ear and ear canal normal.   Left Ear: Tympanic membrane, external ear and ear canal normal.   Nose: Nose normal.   Mouth/Throat: Oropharynx is clear and moist.   Eyes: Pupils are equal, round, and reactive to light. Neck: Normal range of motion. Neck supple. No thyromegaly present. Cardiovascular: Normal rate, regular rhythm, normal heart sounds and intact distal pulses. No murmur heard. Pulmonary/Chest: Effort normal and breath sounds normal. She has no wheezes. Neurological: She is alert and oriented to person, place, and time. GCS score is 15. Skin: Skin is warm and dry. Vitals reviewed. Assessment/Plan:       ICD-10-CM ICD-9-CM    1.  Sore throat J02.9 462 AMB POC RAPID STREP A      amoxicillin (AMOXIL) 500 mg capsule oxyCODONE-acetaminophen (PERCOCET) 5-325 mg per tablet   2. Strep pharyngitis J02.0 034.0 amoxicillin (AMOXIL) 500 mg capsule      oxyCODONE-acetaminophen (PERCOCET) 5-325 mg per tablet   3. Elevated blood pressure reading R03.0 796.2      Patient given opportunity to ask questions. Questions answered. Patient advised to drink warm water with honey and lemon. Doctor's note provided to patient to be excused from work till Thursday. Blood pressure may be elevated due to current illness will re-check at next office visit. Patient understands plan of care. Follow-up Disposition:  Return in about 2 weeks (around 3/27/2018). Written by Michael Martin, as dictated by DO. REMIGIO Cardenas, Dr. Gabriel Lau, confirm that all documentation is accurate.

## 2018-03-13 NOTE — PATIENT INSTRUCTIONS
Strep Throat: Care Instructions  Your Care Instructions    Strep throat is a bacterial infection that causes sudden, severe sore throat and fever. Strep throat, which is caused by bacteria called streptococcus, is treated with antibiotics. Sometimes a strep test is necessary to tell if the sore throat is caused by strep bacteria. Treatment can help ease symptoms and may prevent future problems. Follow-up care is a key part of your treatment and safety. Be sure to make and go to all appointments, and call your doctor if you are having problems. It's also a good idea to know your test results and keep a list of the medicines you take. How can you care for yourself at home? · Take your antibiotics as directed. Do not stop taking them just because you feel better. You need to take the full course of antibiotics. · Strep throat can spread to others until 24 hours after you begin taking antibiotics. During this time, you should avoid contact with other people at work or home, especially infants and children. Do not sneeze or cough on others, and wash your hands often. Keep your drinking glass and eating utensils separate from those of others, and wash these items well in hot, soapy water. · Gargle with warm salt water at least once each hour to help reduce swelling and make your throat feel better. Use 1 teaspoon of salt mixed in 8 fluid ounces of warm water. · Take an over-the-counter pain medication, such as acetaminophen (Tylenol), ibuprofen (Advil, Motrin), or naproxen (Aleve). Read and follow all instructions on the label. · Try an over-the-counter anesthetic throat spray or throat lozenges, which may help relieve throat pain. · Drink plenty of fluids. Fluids may help soothe an irritated throat. Hot fluids, such as tea or soup, may help your throat feel better. · Eat soft solids and drink plenty of clear liquids.  Flavored ice pops, ice cream, scrambled eggs, sherbet, and gelatin dessert (such as Jell-O) may also soothe the throat. · Get lots of rest.  · Do not smoke, and avoid secondhand smoke. If you need help quitting, talk to your doctor about stop-smoking programs and medicines. These can increase your chances of quitting for good. · Use a vaporizer or humidifier to add moisture to the air in your bedroom. Follow the directions for cleaning the machine. When should you call for help? Call your doctor now or seek immediate medical care if:  ? · You have a new or higher fever. ? · You have a fever with a stiff neck or severe headache. ? · You have new or worse trouble swallowing. ? · Your sore throat gets much worse on one side. ? · Your pain becomes much worse on one side of your throat. ? Watch closely for changes in your health, and be sure to contact your doctor if:  ? · You are not getting better after 2 days (48 hours). ? · You do not get better as expected. Where can you learn more? Go to http://cesia-spenser.info/. Enter K625 in the search box to learn more about \"Strep Throat: Care Instructions. \"  Current as of: May 12, 2017  Content Version: 11.4  © 2022-7301 [employer]. Care instructions adapted under license by [employer] (which disclaims liability or warranty for this information). If you have questions about a medical condition or this instruction, always ask your healthcare professional. [address] any warranty or liability for your use of this information.

## 2018-03-29 ENCOUNTER — OFFICE VISIT (OUTPATIENT)
Dept: FAMILY MEDICINE CLINIC | Age: 43
End: 2018-03-29

## 2018-03-29 VITALS
RESPIRATION RATE: 16 BRPM | BODY MASS INDEX: 33.13 KG/M2 | OXYGEN SATURATION: 100 % | SYSTOLIC BLOOD PRESSURE: 140 MMHG | TEMPERATURE: 98.3 F | DIASTOLIC BLOOD PRESSURE: 87 MMHG | HEIGHT: 62 IN | HEART RATE: 80 BPM | WEIGHT: 180 LBS

## 2018-03-29 DIAGNOSIS — R03.0 ELEVATED BLOOD PRESSURE READING: Primary | ICD-10-CM

## 2018-03-29 NOTE — PROGRESS NOTES
Subjective:     HPI:   Marvin Mariee is a 37 y.o. female who presents to the office for blood pressure check. Hypertension  The patient presents for follow up of hypertension. Pt's BP is 140/87 in the office today. Pt desires to manage BP with diet and exercise for next two months. Diet and Lifestyle: generally follows a low fat low cholesterol diet  Cardiovascular ROS: No TIA's, no chest pain on exertion, no dyspnea on exertion, no swelling of ankles. Of note,   Pt states that amoxicillin was helping with there sore throat but she starting wheezing later that week. She went to urgent care on 3/16/2018 and was treated with Clarithromycin. She reports that symptoms have subsided and she feels better now. Current Outpatient Prescriptions   Medication Sig Dispense Refill    oxyCODONE-acetaminophen (PERCOCET) 5-325 mg per tablet Take 1 Tab by mouth every four (4) hours as needed for Pain. Max Daily Amount: 6 Tabs. 10 Tab 0    multivitamin with iron tablet Take 1 Tab by mouth daily. No Known Allergies    Past Medical History:   Diagnosis Date    Anemia 10/13/2014    Fibroids 2016    H/O excision of dermoid cyst 2016        Past Surgical History:   Procedure Laterality Date    HX  SECTION  ,     HX CYST REMOVAL      right ovary    HX OOPHORECTOMY Right 2013    HX TUBAL LIGATION  2001       Family History   Problem Relation Age of Onset    Diabetes Mother     Hypertension Mother     Heart Disease Mother     Heart Disease Father     Diabetes Maternal Grandmother        Social History     Social History    Marital status: SINGLE     Spouse name: N/A    Number of children: N/A    Years of education: N/A     Occupational History    Not on file. Social History Main Topics    Smoking status: Never Smoker    Smokeless tobacco: Never Used    Alcohol use Yes      Comment: occ once a month.      Drug use: No    Sexual activity: Yes Partners: Male     Birth control/ protection: None     Other Topics Concern    Not on file     Social History Narrative       REVIEW OF SYSTEM:  Review of Systems   Constitutional: Negative for chills and fever. Eyes: Negative for blurred vision. Respiratory: Negative for shortness of breath. Cardiovascular: Negative for chest pain, palpitations and leg swelling. Gastrointestinal: Negative for constipation, diarrhea, nausea and vomiting. Musculoskeletal: Negative for joint pain. Neurological: Negative for headaches. Objective:     Visit Vitals    /87 (BP 1 Location: Right arm, BP Patient Position: Sitting)    Pulse 80    Temp 98.3 °F (36.8 °C) (Oral)    Resp 16    Ht 5' 2\" (1.575 m)    Wt 180 lb (81.6 kg)    LMP 01/06/2017    SpO2 100%    BMI 32.92 kg/m2       PHYSICAL EXAM:  Physical Exam   Constitutional: She is oriented to person, place, and time and well-developed, well-nourished, and in no distress. HENT:   Right Ear: Tympanic membrane, external ear and ear canal normal.   Left Ear: Tympanic membrane, external ear and ear canal normal.   Nose: Nose normal.   Mouth/Throat: Oropharynx is clear and moist.   Eyes: Pupils are equal, round, and reactive to light. Neck: Normal range of motion. Neck supple. No thyromegaly present. Cardiovascular: Normal rate, regular rhythm, normal heart sounds and intact distal pulses. No murmur heard. Pulmonary/Chest: Effort normal and breath sounds normal. She has no wheezes. Neurological: She is alert and oriented to person, place, and time. GCS score is 15. Skin: Skin is warm and dry. Vitals reviewed. Assessment/Plan:       ICD-10-CM ICD-9-CM    1. Elevated blood pressure reading R03.0 796.2      Patient given opportunity to ask questions. Questions answered. Will allow pt to manage BP with diet and exercise for next 2 months. Will consider starting patient on HCTZ if BP not controlled.  Discussed and advised to follow dash diet for better control of hypertension. Patient understands plan of care. Follow-up Disposition:  Return in about 2 months (around 5/29/2018). Written by Alma Martinez, as dictated by DO. REMIGIO Strong, Dr. Lori Espinal, confirm that all documentation is accurate.

## 2018-03-29 NOTE — MR AVS SNAPSHOT
[address] [facility] [address] 
[phone] Patient: Nguyen Mckeon MRN: [id number] TQM:[id number] Visit Information Date & Time Provider Department Dept. Phone Encounter #  
 3/29/2018  5:20 PM [facility], [address] [place] [address] [phone] [id number] Follow-up Instructions Return in about 2 months (around 5/29/2018). Upcoming Health Maintenance Date Due Influenza Age 5 to Adult 8/1/2017 PAP AKA CERVICAL CYTOLOGY 12/27/2019 DTaP/Tdap/Td series (2 - Td) 7/22/2026 Allergies as of 3/29/2018  Review Complete On: 3/29/2018 By: Blu Finley LPN No Known Allergies Current Immunizations  Reviewed on 8/16/2016 Name Date Hep B Vaccine (Adult) 2/16/2017, 8/22/2016, 7/22/2016 MMR 8/16/2016 TB Skin Test (PPD) Intradermal 7/22/2016 Tdap 7/22/2016 Not reviewed this visit You Were Diagnosed With   
  
 Codes Comments Elevated blood pressure reading    -  Primary ICD-10-CM: R03.0 ICD-9-CM: 796.2 Vitals BP Pulse Temp Resp Height(growth percentile) Weight(growth percentile) 140/87 (BP 1 Location: Right arm, BP Patient Position: Sitting) 80 98.3 °F (36.8 °C) (Oral) 16 5' 2\" (1.575 m) 180 lb (81.6 kg) LMP SpO2 BMI OB Status Smoking Status 01/06/2017 100% 32.92 kg/m2 Hysterectomy Never Smoker BMI and BSA Data Body Mass Index Body Surface Area  
 32.92 kg/m 2 1.89 m 2 Preferred Pharmacy Pharmacy Name Phone [id number] [place] [facility], [address] [place] [phone] Your Updated Medication List  
  
   
This list is accurate as of 3/29/18  5:36 PM.  Always use your most recent med list.  
  
  
  
  
 multivitamin with iron tablet Take 1 Tab by mouth daily. oxyCODONE-acetaminophen 5-325 mg per tablet Commonly known as:  PERCOCET Take 1 Tab by mouth every four (4) hours as needed for Pain.  Max Daily Amount: 6 Tabs. Follow-up Instructions Return in about 2 months (around 5/29/2018). Patient Instructions DASH Diet: Care Instructions Your Care Instructions The DASH diet is an eating plan that can help lower your blood pressure. DASH stands for Dietary Approaches to Stop Hypertension. Hypertension is high blood pressure. The DASH diet focuses on eating foods that are high in calcium, potassium, and magnesium. These nutrients can lower blood pressure. The foods that are highest in these nutrients are fruits, vegetables, low-fat dairy products, nuts, seeds, and legumes. But taking calcium, potassium, and magnesium supplements instead of eating foods that are high in those nutrients does not have the same effect. The DASH diet also includes whole grains, fish, and poultry. The DASH diet is one of several lifestyle changes your doctor may recommend to lower your high blood pressure. Your doctor may also want you to decrease the amount of sodium in your diet. Lowering sodium while following the DASH diet can lower blood pressure even further than just the DASH diet alone. Follow-up care is a key part of your treatment and safety. Be sure to make and go to all appointments, and call your doctor if you are having problems. It's also a good idea to know your test results and keep a list of the medicines you take. How can you care for yourself at home? Following the DASH diet · Eat 4 to 5 servings of fruit each day. A serving is 1 medium-sized piece of fruit, ½ cup chopped or canned fruit, 1/4 cup dried fruit, or 4 ounces (½ cup) of fruit juice. Choose fruit more often than fruit juice. · Eat 4 to 5 servings of vegetables each day. A serving is 1 cup of lettuce or raw leafy vegetables, ½ cup of chopped or cooked vegetables, or 4 ounces (½ cup) of vegetable juice. Choose vegetables more often than vegetable juice. · Get 2 to 3 servings of low-fat and fat-free dairy each day. A serving is 8 ounces of milk, 1 cup of yogurt, or 1 ½ ounces of cheese. · Eat 6 to 8 servings of grains each day. A serving is 1 slice of bread, 1 ounce of dry cereal, or ½ cup of cooked rice, pasta, or cooked cereal. Try to choose whole-grain products as much as possible. · Limit lean meat, poultry, and fish to 2 servings each day. A serving is 3 ounces, about the size of a deck of cards. · Eat 4 to 5 servings of nuts, seeds, and legumes (cooked dried beans, lentils, and split peas) each week. A serving is 1/3 cup of nuts, 2 tablespoons of seeds, or ½ cup of cooked beans or peas. · Limit fats and oils to 2 to 3 servings each day. A serving is 1 teaspoon of vegetable oil or 2 tablespoons of salad dressing. · Limit sweets and added sugars to 5 servings or less a week. A serving is 1 tablespoon jelly or jam, ½ cup sorbet, or 1 cup of lemonade. · Eat less than 2,300 milligrams (mg) of sodium a day. If you limit your sodium to 1,500 mg a day, you can lower your blood pressure even more. Tips for success · Start small. Do not try to make dramatic changes to your diet all at once. You might feel that you are missing out on your favorite foods and then be more likely to not follow the plan. Make small changes, and stick with them. Once those changes become habit, add a few more changes. · Try some of the following: ¨ Make it a goal to eat a fruit or vegetable at every meal and at snacks. This will make it easy to get the recommended amount of fruits and vegetables each day. ¨ Try yogurt topped with fruit and nuts for a snack or healthy dessert. ¨ Add lettuce, tomato, cucumber, and onion to sandwiches. ¨ Combine a ready-made pizza crust with low-fat mozzarella cheese and lots of vegetable toppings. Try using tomatoes, squash, spinach, broccoli, carrots, cauliflower, and onions. ¨ Have a variety of cut-up vegetables with a low-fat dip as an appetizer instead of chips and dip. ¨ Sprinkle sunflower seeds or chopped almonds over salads. Or try adding chopped walnuts or almonds to cooked vegetables. ¨ Try some vegetarian meals using beans and peas. Add garbanzo or kidney beans to salads. Make burritos and tacos with mashed vieira beans or black beans. Where can you learn more? Go to http://cesia-spenser.info/. Enter T758 in the search box to learn more about \"DASH Diet: Care Instructions. \" Current as of: September 21, 2016 Content Version: 11.4 © 3413-7654 [employer]. Care instructions adapted under license by [employer] (which disclaims liability or warranty for this information). If you have questions about a medical condition or this instruction, always ask your healthcare professional. [place] [address] any warranty or liability for your use of this information. Introducing [facility]! Dear April: 
Thank you for requesting a [employer] account. Our records indicate that you already have an active [employer] account. You can access your account anytime at [email or web address] Did you know that you can access your hospital and ER discharge instructions at any time in [employer] [facility]? You can also review all of your test results from your hospital stay or ER visit. Additional Information If you have questions, please visit the Frequently Asked Questions section of the [employer] website at [email or web address] Remember, [employer] is NOT to be used for urgent needs. For medical emergencies, dial 911. Now available from your iPhone and Android! Please provide this summary of care documentation to your next provider. Your primary care clinician is listed as Nasrin Ruth. If you have any questions after today's visit, please call [phone].

## 2018-03-29 NOTE — PATIENT INSTRUCTIONS

## 2018-03-29 NOTE — PROGRESS NOTES
1. Have you been to the ER, urgent care clinic since your last visit? Hospitalized since your last visit? Yes, Urgent, congestion    2. Have you seen or consulted any other health care providers outside of the [address] [place] [address] since your last visit? Include any pap smears or colon screening. No     Patient presents in office today for routine care.   Patient concerns: b/p check

## 2018-11-14 ENCOUNTER — HOSPITAL ENCOUNTER (OUTPATIENT)
Dept: LAB | Age: 43
Discharge: HOME | End: 2018-11-14
Payer: COMMERCIAL

## 2018-11-14 ENCOUNTER — OFFICE VISIT (OUTPATIENT)
Dept: INTERNAL MEDICINE CLINIC | Age: 43
End: 2018-11-14

## 2018-11-14 VITALS
DIASTOLIC BLOOD PRESSURE: 95 MMHG | HEIGHT: 62 IN | BODY MASS INDEX: 33.13 KG/M2 | WEIGHT: 180 LBS | OXYGEN SATURATION: 100 % | RESPIRATION RATE: 18 BRPM | HEART RATE: 65 BPM | SYSTOLIC BLOOD PRESSURE: 143 MMHG | TEMPERATURE: 97 F

## 2018-11-14 DIAGNOSIS — R42 DIZZINESS: ICD-10-CM

## 2018-11-14 DIAGNOSIS — R42 DIZZINESS: Primary | ICD-10-CM

## 2018-11-14 DIAGNOSIS — G44.89 OTHER HEADACHE SYNDROME: ICD-10-CM

## 2018-11-14 LAB
ALBUMIN SERPL-MCNC: 4.1 G/DL (ref 3.4–5)
ALBUMIN/GLOB SERPL: 1.3 {RATIO} (ref 0.8–1.7)
ALP SERPL-CCNC: 78 U/L (ref 45–117)
ALT SERPL-CCNC: 29 U/L (ref 13–56)
ANION GAP SERPL CALC-SCNC: 10 MMOL/L (ref 3–18)
AST SERPL-CCNC: 19 U/L (ref 15–37)
BASOPHILS # BLD: 0 K/UL (ref 0–0.1)
BASOPHILS NFR BLD: 0 % (ref 0–2)
BILIRUB SERPL-MCNC: 0.1 MG/DL (ref 0.2–1)
BUN SERPL-MCNC: 13 MG/DL (ref 7–18)
BUN/CREAT SERPL: 18 (ref 12–20)
CALCIUM SERPL-MCNC: 8.9 MG/DL (ref 8.5–10.1)
CHLORIDE SERPL-SCNC: 105 MMOL/L (ref 100–108)
CO2 SERPL-SCNC: 26 MMOL/L (ref 21–32)
CREAT SERPL-MCNC: 0.73 MG/DL (ref 0.6–1.3)
DIFFERENTIAL METHOD BLD: NORMAL
EOSINOPHIL # BLD: 0.2 K/UL (ref 0–0.4)
EOSINOPHIL NFR BLD: 2 % (ref 0–5)
ERYTHROCYTE [DISTWIDTH] IN BLOOD BY AUTOMATED COUNT: 13 % (ref 11.6–14.5)
GLOBULIN SER CALC-MCNC: 3.2 G/DL (ref 2–4)
GLUCOSE SERPL-MCNC: 84 MG/DL (ref 74–99)
HCT VFR BLD AUTO: 41.2 % (ref 35–45)
HGB BLD-MCNC: 13.5 G/DL (ref 12–16)
LYMPHOCYTES # BLD: 1.5 K/UL (ref 0.9–3.6)
LYMPHOCYTES NFR BLD: 22 % (ref 21–52)
MCH RBC QN AUTO: 30.8 PG (ref 24–34)
MCHC RBC AUTO-ENTMCNC: 32.8 G/DL (ref 31–37)
MCV RBC AUTO: 93.8 FL (ref 74–97)
MONOCYTES # BLD: 0.4 K/UL (ref 0.05–1.2)
MONOCYTES NFR BLD: 5 % (ref 3–10)
NEUTS SEG # BLD: 4.7 K/UL (ref 1.8–8)
NEUTS SEG NFR BLD: 71 % (ref 40–73)
PLATELET # BLD AUTO: 311 K/UL (ref 135–420)
PMV BLD AUTO: 10.3 FL (ref 9.2–11.8)
POTASSIUM SERPL-SCNC: 4.5 MMOL/L (ref 3.5–5.5)
PROT SERPL-MCNC: 7.3 G/DL (ref 6.4–8.2)
RBC # BLD AUTO: 4.39 M/UL (ref 4.2–5.3)
SODIUM SERPL-SCNC: 141 MMOL/L (ref 136–145)
WBC # BLD AUTO: 6.7 K/UL (ref 4.6–13.2)

## 2018-11-14 PROCEDURE — 85025 COMPLETE CBC W/AUTO DIFF WBC: CPT

## 2018-11-14 PROCEDURE — 36415 COLL VENOUS BLD VENIPUNCTURE: CPT

## 2018-11-14 PROCEDURE — 80053 COMPREHEN METABOLIC PANEL: CPT

## 2018-11-14 RX ORDER — MECLIZINE HCL 12.5 MG 12.5 MG/1
12.5 TABLET ORAL
Qty: 20 TAB | Refills: 0 | Status: SHIPPED | OUTPATIENT
Start: 2018-11-14 | End: 2018-11-24

## 2018-11-14 NOTE — PROGRESS NOTES
ROOM # 1    Kanwal Mills presents today for   Chief Complaint   Patient presents with    Dizziness     x3 days with  headache and nauseous       Kanwal Mills preferred language for health care discussion is english/other. Is someone accompanying this pt? no    Is the patient using any DME equipment during OV? no    Depression Screening:  PHQ over the last two weeks 2/16/2017 7/22/2016 10/3/2014   Little interest or pleasure in doing things Not at all Not at all Not at all   Feeling down, depressed, irritable, or hopeless Not at all Not at all Not at all   Total Score PHQ 2 0 0 0       Learning Assessment:  Learning Assessment 10/3/2014 9/17/2014   PRIMARY LEARNER Patient Patient   PRIMARY LANGUAGE ENGLISH ENGLISH   LEARNER PREFERENCE PRIMARY LISTENING DEMONSTRATION     DEMONSTRATION -   ANSWERED BY patient patient   RELATIONSHIP SELF SELF       Abuse Screening:  Abuse Screening Questionnaire 2/16/2017 7/22/2016 10/3/2014   Do you ever feel afraid of your partner? N N N   Are you in a relationship with someone who physically or mentally threatens you? N N N   Is it safe for you to go home? Y Y Y       Fall Risk  No flowsheet data found. Health Maintenance reviewed and discussed per provider. Yes    Kanwal Mills is due for   Health Maintenance Due   Topic Date Due    Influenza Age 5 to Adult  08/01/2018         Please order/place referral if appropriate. Advance Directive:  1. Do you have an advance directive in place? Patient Reply: no    2. If not, would you like material regarding how to put one in place? Patient Reply: no    Coordination of Care:  1. Have you been to the ER, urgent care clinic since your last visit? Hospitalized since your last visit? no    2. Have you seen or consulted any other health care providers outside of the [address] [place] [address] since your last visit? Include any pap smears or colon screening.  no

## 2018-11-14 NOTE — PROGRESS NOTES
Patient of Dr Abebe Richard present with dizziness x 3 days associated with HA and nausea. States it started at work three days ago, she suddenly had a sensation of imbalance. States she woke up this morning and felt like the room was spinning. She denies any URI,abd pain, dysuria, palpitations,ear pressure,ear pain, SOB,CP but she became dizzy during [place]-Hallpike test but she is negative  For [place],negative orthostatic as well: 125/81 lying, 134/85 sitting and 143/95 standing with respective pulses at 61,24,23. States she was anemic prior to her hysterectomy, she is not pregnant , she admits to not drinking enough water. HISTORY OF PRESENT ILLNESS  April C Michael Ng is a 37 y.o. female. HPI    Review of Systems   Constitutional: Negative. HENT: Negative. Eyes: Negative. Respiratory: Negative. Cardiovascular: Negative. Gastrointestinal: Negative. Genitourinary: Negative. Musculoskeletal: Negative. Skin: Negative. Neurological: Positive for dizziness and headaches. Psychiatric/Behavioral: The patient is nervous/anxious. Physical Exam   Constitutional: She is oriented to person, place, and time. She appears well-developed. HENT:   Head: Normocephalic. Eyes: Pupils are equal, round, and reactive to light. Neck: Normal range of motion. Cardiovascular: Normal rate. Pulmonary/Chest: Effort normal and breath sounds normal.   Abdominal: Soft. Bowel sounds are normal.   Neurological: She is alert and oriented to person, place, and time. GCS eye subscore is 4. GCS verbal subscore is 5. GCS motor subscore is 6. Skin: Skin is warm. Psychiatric: She has a normal mood and affect.  Her speech is normal and behavior is normal. Judgment and thought content normal. Cognition and memory are normal.       ASSESSMENT and PLAN    ICD-10-CM ICD-9-CM    1. Dizziness R42 780.4 AMB POC EKG ROUTINE W/ 12 LEADS, INTER & REP      CBC WITH AUTOMATED DIFF      METABOLIC PANEL, COMPREHENSIVE meclizine (ANTIVERT) 12.5 mg tablet   2. Other headache syndrome G44.89 339.89      Encounter Diagnoses   Name Primary?  Dizziness Yes    Other headache syndrome      Orders Placed This Encounter    CBC WITH AUTOMATED DIFF    METABOLIC PANEL, COMPREHENSIVE    AMB POC EKG ROUTINE W/ 12 LEADS, INTER & REP    meclizine (ANTIVERT) 12.5 mg tablet     Orders Placed This Encounter    CBC WITH AUTOMATED DIFF     Standing Status:   Future     Standing Expiration Date:   62/90/7609    METABOLIC PANEL, COMPREHENSIVE     Standing Status:   Future     Standing Expiration Date:   11/15/2019    AMB POC EKG ROUTINE W/ 12 LEADS, INTER & REP     Order Specific Question:   Reason for Exam:     Answer:   Dizziness    meclizine (ANTIVERT) 12.5 mg tablet     Sig: Take 1 Tab by mouth three (3) times daily as needed for up to 10 days. Dispense:  20 Tab     Refill:  0     Orders Placed This Encounter    CBC WITH AUTOMATED DIFF    METABOLIC PANEL, COMPREHENSIVE    AMB POC EKG ROUTINE W/ 12 LEADS, INTER & REP    meclizine (ANTIVERT) 12.5 mg tablet     Diagnoses and all orders for this visit:    1. Dizziness  -     AMB POC EKG ROUTINE W/ 12 LEADS, INTER & REP  -     CBC WITH AUTOMATED DIFF; Future  -     METABOLIC PANEL, COMPREHENSIVE; Future  -     meclizine (ANTIVERT) 12.5 mg tablet; Take 1 Tab by mouth three (3) times daily as needed for up to 10 days. 2. Other headache syndrome      Follow-up Disposition:  Return if symptoms worsen or fail to improve.   current treatment plan is effective, no change in therapy  BP just slightly elevated today, patient checks BP at home, she stays in the 951 systolic consistently, EKG negative for any dysrhythmia, likely BPPV, will treat with Antivert ,check labs for any electrolytes abnormality,CBC for anemia or dehydration or any other cause of dizziness, patient has an appointment with her PCP on the 12/15, f/u with PCP or return if symptoms do not improve or worsen, discussed possible referral to ENT with patient. Risks/benefits discussed with patient or patient surrogate

## 2018-11-14 NOTE — PATIENT INSTRUCTIONS
Vertigo: Care Instructions  Your Care Instructions    Vertigo is the feeling that you or your surroundings are moving when there is no actual movement. It is often described as a feeling of spinning, whirling, falling, or tilting. Vertigo may make you vomit or feel nauseated. You may have trouble standing or walking and may lose your balance. Vertigo is often related to an inner ear problem, but it can have other more serious causes. If vertigo continues, you may need more tests to find its cause. Follow-up care is a key part of your treatment and safety. Be sure to make and go to all appointments, and call your doctor if you are having problems. It's also a good idea to know your test results and keep a list of the medicines you take. How can you care for yourself at home? · Do not lie flat on your back. Prop yourself up slightly. This may reduce the spinning feeling. Keep your eyes open. · Move slowly so that you do not fall. · If your doctor recommends medicine, take it exactly as directed. · Do not drive while you are having vertigo. Certain exercises, called Miles-Daroff exercises, can help decrease vertigo. To do Miles-Daroff exercises:  · Sit on the edge of a bed or sofa and quickly lie down on the side that causes the worst vertigo. Lie on your side with your ear down. · Stay in this position for at least 30 seconds or until the vertigo goes away. · Sit up. If this causes vertigo, wait for it to stop. · Repeat the procedure on the other side. · Repeat this 10 times. Do these exercises 2 times a day until the vertigo is gone. When should you call for help? Call 911 anytime you think you may need emergency care. For example, call if:    · You passed out (lost consciousness).     · You have symptoms of a stroke. These may include:  ? Sudden numbness, tingling, weakness, or loss of movement in your face, arm, or leg, especially on only one side of your body.   ? Sudden vision changes. ? Sudden trouble speaking. ? Sudden confusion or trouble understanding simple statements. ? Sudden problems with walking or balance. ? A sudden, severe headache that is different from past headaches.    Call your doctor now or seek immediate medical care if:    · Vertigo occurs with a fever, a headache, or ringing in your ears.     · You have new or increased nausea and vomiting.    Watch closely for changes in your health, and be sure to contact your doctor if:    · Vertigo gets worse or happens more often.     · Vertigo has not gotten better after 2 weeks. Where can you learn more? Go to http://cesia-spenser.info/. Enter X772 in the search box to learn more about \"Vertigo: Care Instructions. \"  Current as of: March 28, 2018  Content Version: 11.8  © 3063-0768 [employer] (formerly [employer]). Care instructions adapted under license by [employer] (which disclaims liability or warranty for this information). If you have questions about a medical condition or this instruction, always ask your healthcare professional. [place] [address] any warranty or liability for your use of this information. Epley Maneuver for Vertigo: Exercises  Your Care Instructions  The Epley Maneuver is a series of movements your doctor may use to treat your vertigo. Here are the steps for the exercises. Your doctor or physical therapist will guide you through the movements. A single 10- to 15-minute session often is all that's needed. Crystal debris (canaliths) cause the vertigo. When your head is moved into different positions, the debris moves freely. This may cause your symptoms to stop. How to do the exercises  Step 1    1. You will sit on the doctor's exam table. Your legs will be out in front of you. The doctor or physical therapist will turn your head so that it is care home between looking straight ahead and looking to the side that causes the worst vertigo.   2. Without changing your head position, he or she will guide you back quickly. Your shoulders will be on the table. Your head will hang over the edge of the table. At this point, the side of your head that is causing the worst vertigo will face the floor. You'll stay in this position for 30 seconds or until your symptoms stop. Step 2    1. Then, the doctor or physical therapist will turn your head to the other side. You don't need to lift your head. The other side of your head will face the floor. You will stay in this position for 30 seconds or until your symptoms stop. Step 3    1. The doctor or physical therapist will help you roll your body in the same direction that your head is facing. You will lie on your side. (For example, if you are looking to your right, you will roll onto your right side.) The side that causes the worst symptoms should be facing up. You'll stay in this position for another 30 seconds or until your symptoms stop. Step 4    1. The doctor or physical therapist will then help you to sit back up. Your legs will hang off the table on the same side that you were facing. Follow-up care is a key part of your treatment and safety. Be sure to make and go to all appointments, and call your doctor if you are having problems. It's also a good idea to know your test results and keep a list of the medicines you take. Where can you learn more? Go to http://cesia-spenser.info/. Enter A766 in the search box to learn more about \"Epley Maneuver for Vertigo: Exercises. \"  Current as of: March 28, 2018  Content Version: 11.8  © 1118-3028 [employer]. Care instructions adapted under license by [employer] (which disclaims liability or warranty for this information).  If you have questions about a medical condition or this instruction, always ask your healthcare professional. [address] any warranty or liability for your use of this information.

## 2018-11-15 ENCOUNTER — TELEPHONE (OUTPATIENT)
Dept: INTERNAL MEDICINE CLINIC | Age: 43
End: 2018-11-15

## 2018-11-15 NOTE — PROGRESS NOTES
Please inform patient that there is no indication in her CBC and CMP that could justify the HA as they are both WNL. Ask patient how she is feeling with the shots she received in clinic and with the medication that was prescribed. If the HA has not improved she may need to go the ED for a thorough work up.

## 2018-11-15 NOTE — TELEPHONE ENCOUNTER
2 patient identifiers confirmed. Patient verbalizes understanding of lab results and states she is feeling a lot better no more headaches.

## 2018-11-15 NOTE — TELEPHONE ENCOUNTER
----- Message from Gifty Borrego NP sent at 11/15/2018  7:07 AM EST -----  Please inform patient that there is no indication in her CBC and CMP that could justify the HA as they are both WNL. Ask patient how she is feeling with the shots she received in clinic and with the medication that was prescribed. If the HA has not improved she may need to go the ED for a thorough work up.

## 2018-11-19 ENCOUNTER — TELEPHONE (OUTPATIENT)
Dept: INTERNAL MEDICINE CLINIC | Age: 43
End: 2018-11-19

## 2018-11-19 NOTE — TELEPHONE ENCOUNTER
Patient reports feeling extra drowsy and sleepy when on medication prescribed. Patient would like to speak with nurse regarding side effects and effectiveness.

## 2018-11-26 NOTE — TELEPHONE ENCOUNTER
Attempted to contact pt at  number, no answer. Pt has a VM that has not been set up. Will continue to try to contact pt.

## 2018-11-28 NOTE — TELEPHONE ENCOUNTER
Pt contacted at home number. 2 pt identifiers confirmed. Pt states her symptoms are getting better but she stills sometimes experiences the vertigo symptoms at bedtime. Pt states she will follow up with her ENT to be sure there is no fluid buildup in her ears. Nurse verbalized understanding. No other questions or concerns at this time.

## 2018-12-18 ENCOUNTER — OFFICE VISIT (OUTPATIENT)
Dept: FAMILY MEDICINE CLINIC | Age: 43
End: 2018-12-18

## 2018-12-18 VITALS
HEIGHT: 62 IN | RESPIRATION RATE: 16 BRPM | BODY MASS INDEX: 33.31 KG/M2 | DIASTOLIC BLOOD PRESSURE: 80 MMHG | TEMPERATURE: 97.6 F | WEIGHT: 181 LBS | SYSTOLIC BLOOD PRESSURE: 135 MMHG | HEART RATE: 69 BPM | OXYGEN SATURATION: 100 %

## 2018-12-18 DIAGNOSIS — Z13.220 SCREENING CHOLESTEROL LEVEL: ICD-10-CM

## 2018-12-18 DIAGNOSIS — Z00.00 WELL WOMAN EXAM (NO GYNECOLOGICAL EXAM): Primary | ICD-10-CM

## 2018-12-18 DIAGNOSIS — M79.675 GREAT TOE PAIN, LEFT: ICD-10-CM

## 2018-12-18 RX ORDER — MECLIZINE HCL 12.5 MG 12.5 MG/1
TABLET ORAL
COMMUNITY
End: 2019-03-14 | Stop reason: SDUPTHER

## 2018-12-18 NOTE — PROGRESS NOTES
1. Have you been to the ER, urgent care clinic since your last visit? Hospitalized since your last visit? No    2. Have you seen or consulted any other health care providers outside of the [address] [place] [address] since your last visit? Include any pap smears or colon screening.  No     complete physical examination  Bunion left foot

## 2018-12-18 NOTE — PROGRESS NOTES
Subjective:     HPI:   Mitchell Andrew is a 37 y.o. female who presents to the office today for well woman exam and routine care. Left foot pain: Pt complains of a bunion to her left great toe ongoing for several months. She states she is no longer able to wear heels and is only able to wear tennis shoes due to the pain in her foot. She report taking tylenol without relief from her pain. She desires a referral to pediatry. Vertigo: Pt reports she experienced vertigo. She states she went to Urgent Care where she was prescribed Antivert. She states she takes the medication at night before bed because that is when she feels the most dizzy. Current Outpatient Medications   Medication Sig Dispense Refill    meclizine (ANTIVERT) 12.5 mg tablet Take  by mouth three (3) times daily as needed.  multivitamin with iron tablet Take 1 Tab by mouth daily. No Known Allergies    Past Medical History:   Diagnosis Date    Anemia 10/13/2014    Fibroids 2016    H/O excision of dermoid cyst 2016        Past Surgical History:   Procedure Laterality Date    HX  SECTION  ,     HX CYST REMOVAL      right ovary    HX HYSTERECTOMY  2016    due to heavy menses and uterine fibroids.      HX OOPHORECTOMY Right 2013    HX TUBAL LIGATION  2001       Family History   Problem Relation Age of Onset    Diabetes Mother     Hypertension Mother     Heart Disease Mother     Heart Disease Father     Diabetes Maternal Grandmother        Social History     Socioeconomic History    Marital status: SINGLE     Spouse name: Not on file    Number of children: Not on file    Years of education: Not on file    Highest education level: Not on file   Social Needs    Financial resource strain: Not on file    Food insecurity - worry: Not on file    Food insecurity - inability: Not on file   [employer] needs - medical: Not on file   [employer] needs - non-medical: Not on file   Occupational History    Not on file   Tobacco Use    Smoking status: Never Smoker    Smokeless tobacco: Never Used   Substance and Sexual Activity    Alcohol use: Yes     Frequency: Monthly or less     Drinks per session: 1 or 2     Binge frequency: Never     Comment: occ once a month.  Drug use: No    Sexual activity: Yes     Partners: Male     Birth control/protection: None, Surgical   Other Topics Concern    Not on file   Social History Narrative    Not on file       REVIEW OF SYSTEM:  Review of Systems   Constitutional: Negative for chills and fever. Eyes: Negative for blurred vision. Respiratory: Negative for shortness of breath. Cardiovascular: Negative for chest pain, palpitations and leg swelling. Gastrointestinal: Negative for constipation, diarrhea, nausea and vomiting. Musculoskeletal: Positive for joint pain. Neurological: Positive for dizziness. Negative for headaches. Objective:     Visit Vitals  /80 (BP 1 Location: Right arm, BP Patient Position: Sitting)   Pulse 69   Temp 97.6 °F (36.4 °C) (Oral)   Resp 16   Ht 5' 2\" (1.575 m)   Wt 181 lb (82.1 kg)   LMP 01/06/2017   SpO2 100%   BMI 33.11 kg/m²       PHYSICAL EXAM:  Physical Exam   Constitutional: She is oriented to person, place, and time and well-developed, well-nourished, and in no distress. HENT:   Right Ear: Tympanic membrane, external ear and ear canal normal.   Left Ear: Tympanic membrane, external ear and ear canal normal.   Nose: Nose normal.   Mouth/Throat: Oropharynx is clear and moist.   Eyes: Pupils are equal, round, and reactive to light. Neck: Normal range of motion. Neck supple. No thyromegaly present. Cardiovascular: Normal rate, regular rhythm, normal heart sounds and intact distal pulses. No murmur heard. Pulmonary/Chest: Effort normal and breath sounds normal. She has no wheezes. Musculoskeletal:   Bunion noted to left great toe.    Neurological: She is alert and oriented to person, place, and time. GCS score is 15. Skin: Skin is warm and dry. Vitals reviewed. Assessment/Plan:       ICD-10-CM ICD-9-CM    1. Well woman exam (no gynecological exam) Z00.00 V70.0 LIPID PANEL      LIPID PANEL   2. Screening cholesterol level Z13.220 V77.91 LIPID PANEL      LIPID PANEL   3. Great toe pain, left M79.675 729.5 REFERRAL TO PODIATRY     Patient given opportunity to ask questions. Questions answered. Patient understands plan of care. Fasting labs drawn today. Follow-up Disposition:  Return in about 1 year (around 12/18/2019), or well woman. .    Written by Kayley Minimarissa, as dictated by Ne Flores DO.    I, Dr. Ne Flores, confirm that all documentation is accurate.

## 2018-12-18 NOTE — PROGRESS NOTES
Subjective:   37 y.o. female for annual routine checkup. Patient's last menstrual period was 2017. LMP   Last PAP?: unsure. Pt has had a full hysterectomy   History of abnormal PAP: none  Concerns for STD? none  Abnormal vaginal discharge: none  Family history for GYN cancer: none   Family history breast cancer: none  Mammogram: 2017   Family history for colon cancer: none  Pt's father has lung cancer   Flu shot: Pt declines    Current Outpatient Medications   Medication Sig Dispense Refill    meclizine (ANTIVERT) 12.5 mg tablet Take  by mouth three (3) times daily as needed.  multivitamin with iron tablet Take 1 Tab by mouth daily. Patient Active Problem List   Diagnosis Code    Anemia D64.9    S/P laparoscopic assisted vaginal hysterectomy (LAVH) Z90.710     Current Outpatient Medications   Medication Sig Dispense Refill    meclizine (ANTIVERT) 12.5 mg tablet Take  by mouth three (3) times daily as needed.  multivitamin with iron tablet Take 1 Tab by mouth daily. No Known Allergies  Past Medical History:   Diagnosis Date    Anemia 10/13/2014    Fibroids 2016    H/O excision of dermoid cyst 2016     Past Surgical History:   Procedure Laterality Date    HX  SECTION  ,     HX CYST REMOVAL      right ovary    HX HYSTERECTOMY  2016    due to heavy menses and uterine fibroids.  HX OOPHORECTOMY Right 2013    HX TUBAL LIGATION  2001     Family History   Problem Relation Age of Onset    Diabetes Mother     Hypertension Mother     Heart Disease Mother     Heart Disease Father     Diabetes Maternal Grandmother      Social History     Tobacco Use    Smoking status: Never Smoker    Smokeless tobacco: Never Used   Substance Use Topics    Alcohol use: Yes     Frequency: Monthly or less     Drinks per session: 1 or 2     Binge frequency: Never     Comment: occ once a month. ROS:    General Feeling well.    Cardio/Pulm:No dyspnea or chest pain on exertion. Abdomen: No abdominal pain, change in bowel habits, black or bloody stools. :  No urinary tract symptoms. GYN ROS: normal menses, no abnormal bleeding, pelvic pain or discharge, no breast pain or new or enlarging lumps on self exam. No neurological complaints. Psychological ROS: negative    Objective:     Visit Vitals  /80 (BP 1 Location: Right arm, BP Patient Position: Sitting)   Pulse 69   Temp 97.6 °F (36.4 °C) (Oral)   Resp 16   Ht 5' 2\" (1.575 m)   Wt 181 lb (82.1 kg)   LMP 01/06/2017   SpO2 100%   BMI 33.11 kg/m²     General: appears well, alert, oriented x 3, in no distress. EENT:  ENT normal.  Neck supple. No adenopathy or thyromegaly. JOAQUIN. Lungs are clear, good air entry, no wheezes, rhonchi or rales. Heart: S1 and S2 normal, no murmurs, regular rate and rhythm. Abdomen soft without tenderness, guarding, mass or organomegaly. Extremities show no edema, normal peripheral pulses. Neurological is normal, no focal findings. Assessment/Plan:   well woman  mammogram  return annually or prn    ICD-10-CM ICD-9-CM    1. Well woman exam (no gynecological exam) Z00.00 V70.0 LIPID PANEL      LIPID PANEL   2. Screening cholesterol level Z13.220 V77.91 LIPID PANEL      LIPID PANEL   3. Great toe pain, left M79.675 729.5 REFERRAL TO PODIATRY   . Written by Jackie Monk, as dictated by Hansel Villegas DO.     I, Dr. Hansel Villegas, confirm that all documentation is accurate.

## 2018-12-18 NOTE — PATIENT INSTRUCTIONS
Well Visit, Ages 25 to 48: Care Instructions  Your Care Instructions    Physical exams can help you stay healthy. Your doctor has checked your overall health and may have suggested ways to take good care of yourself. He or she also may have recommended tests. At home, you can help prevent illness with healthy eating, regular exercise, and other steps. Follow-up care is a key part of your treatment and safety. Be sure to make and go to all appointments, and call your doctor if you are having problems. It's also a good idea to know your test results and keep a list of the medicines you take. How can you care for yourself at home? · Reach and stay at a healthy weight. This will lower your risk for many problems, such as obesity, diabetes, heart disease, and high blood pressure. · Get at least 30 minutes of physical activity on most days of the week. Walking is a good choice. You also may want to do other activities, such as running, swimming, cycling, or playing tennis or team sports. Discuss any changes in your exercise program with your doctor. · Do not smoke or allow others to smoke around you. If you need help quitting, talk to your doctor about stop-smoking programs and medicines. These can increase your chances of quitting for good. · Talk to your doctor about whether you have any risk factors for sexually transmitted infections (STIs). Having one sex partner (who does not have STIs and does not have sex with anyone else) is a good way to avoid these infections. · Use birth control if you do not want to have children at this time. Talk with your doctor about the choices available and what might be best for you. · Protect your skin from too much sun. When you're outdoors from 10 a.m. to 4 p.m., stay in the shade or cover up with clothing and a hat with a wide brim. Wear sunglasses that block UV rays. Even when it's cloudy, put broad-spectrum sunscreen (SPF 30 or higher) on any exposed skin.   · See a dentist one or two times a year for checkups and to have your teeth cleaned. · Wear a seat belt in the car. · Drink alcohol in moderation, if at all. That means no more than 2 drinks a day for men and 1 drink a day for women. Follow your doctor's advice about when to have certain tests. These tests can spot problems early. For everyone  · Cholesterol. Have the fat (cholesterol) in your blood tested after age 21. Your doctor will tell you how often to have this done based on your age, family history, or other things that can increase your risk for heart disease. · Blood pressure. Have your blood pressure checked during a routine doctor visit. Your doctor will tell you how often to check your blood pressure based on your age, your blood pressure results, and other factors. · Vision. Talk with your doctor about how often to have a glaucoma test.  · Diabetes. Ask your doctor whether you should have tests for diabetes. · Colon cancer. Have a test for colon cancer at age 48. You may have one of several tests. If you are younger than 48, you may need a test earlier if you have any risk factors. Risk factors include whether you already had a precancerous polyp removed from your colon or whether your parent, brother, sister, or child has had colon cancer. For women  · Breast exam and mammogram. Talk to your doctor about when you should have a clinical breast exam and a mammogram. Medical experts differ on whether and how often women under 50 should have these tests. Your doctor can help you decide what is right for you. · Pap test and pelvic exam. Begin Pap tests at age 24. A Pap test is the best way to find cervical cancer. The test often is part of a pelvic exam. Ask how often to have this test.  · Tests for sexually transmitted infections (STIs). Ask whether you should have tests for STIs. You may be at risk if you have sex with more than one person, especially if your partners do not wear condoms.   · .  When should you call for help? Watch closely for changes in your health, and be sure to contact your doctor if you have any problems or symptoms that concern you. Where can you learn more? Go to http://cesia-spenser.info/. Enter P072 in the search box to learn more about \"Well Visit, Ages 25 to 48: Care Instructions. \"  Current as of: March 29, 2018  Content Version: 11.8  © 8580-5499 [employer]. Care instructions adapted under license by [employer] (which disclaims liability or warranty for this information). If you have questions about a medical condition or this instruction, always ask your healthcare professional. [address] any warranty or liability for your use of this information.

## 2018-12-19 LAB
CHOLEST SERPL-MCNC: 175 MG/DL (ref 110–200)
HDLC SERPL-MCNC: 3.1 MG/DL (ref 0–5)
HDLC SERPL-MCNC: 57 MG/DL (ref 40–59)
LDLC SERPL CALC-MCNC: 107 MG/DL (ref 50–99)
TRIGL SERPL-MCNC: 52 MG/DL (ref 40–149)
VLDLC SERPL CALC-MCNC: 10 MG/DL (ref 8–30)

## 2019-03-14 ENCOUNTER — OFFICE VISIT (OUTPATIENT)
Dept: FAMILY MEDICINE CLINIC | Age: 44
End: 2019-03-14

## 2019-03-14 ENCOUNTER — HOSPITAL ENCOUNTER (OUTPATIENT)
Dept: LAB | Age: 44
Discharge: HOME | End: 2019-03-14
Payer: COMMERCIAL

## 2019-03-14 VITALS
OXYGEN SATURATION: 100 % | BODY MASS INDEX: 34.04 KG/M2 | WEIGHT: 185 LBS | RESPIRATION RATE: 16 BRPM | TEMPERATURE: 98.6 F | SYSTOLIC BLOOD PRESSURE: 131 MMHG | HEART RATE: 72 BPM | HEIGHT: 62 IN | DIASTOLIC BLOOD PRESSURE: 86 MMHG

## 2019-03-14 DIAGNOSIS — R42 VERTIGO: ICD-10-CM

## 2019-03-14 DIAGNOSIS — R42 VERTIGO: Primary | ICD-10-CM

## 2019-03-14 DIAGNOSIS — F43.29 STRESS AND ADJUSTMENT REACTION: ICD-10-CM

## 2019-03-14 LAB
BASOPHILS # BLD: 0 K/UL (ref 0–0.1)
BASOPHILS NFR BLD: 1 % (ref 0–2)
DIFFERENTIAL METHOD BLD: NORMAL
EOSINOPHIL # BLD: 0.2 K/UL (ref 0–0.4)
EOSINOPHIL NFR BLD: 3 % (ref 0–5)
ERYTHROCYTE [DISTWIDTH] IN BLOOD BY AUTOMATED COUNT: 13.3 % (ref 11.6–14.5)
HCT VFR BLD AUTO: 40.8 % (ref 35–45)
HGB BLD-MCNC: 13.1 G/DL (ref 12–16)
LYMPHOCYTES # BLD: 2.7 K/UL (ref 0.9–3.6)
LYMPHOCYTES NFR BLD: 38 % (ref 21–52)
MCH RBC QN AUTO: 29.8 PG (ref 24–34)
MCHC RBC AUTO-ENTMCNC: 32.1 G/DL (ref 31–37)
MCV RBC AUTO: 92.9 FL (ref 74–97)
MONOCYTES # BLD: 0.7 K/UL (ref 0.05–1.2)
MONOCYTES NFR BLD: 10 % (ref 3–10)
NEUTS SEG # BLD: 3.4 K/UL (ref 1.8–8)
NEUTS SEG NFR BLD: 48 % (ref 40–73)
PLATELET # BLD AUTO: 306 K/UL (ref 135–420)
PMV BLD AUTO: 9.7 FL (ref 9.2–11.8)
RBC # BLD AUTO: 4.39 M/UL (ref 4.2–5.3)
WBC # BLD AUTO: 7 K/UL (ref 4.6–13.2)

## 2019-03-14 PROCEDURE — 36415 COLL VENOUS BLD VENIPUNCTURE: CPT

## 2019-03-14 PROCEDURE — 80053 COMPREHEN METABOLIC PANEL: CPT

## 2019-03-14 PROCEDURE — 85025 COMPLETE CBC W/AUTO DIFF WBC: CPT

## 2019-03-14 RX ORDER — MECLIZINE HCL 12.5 MG 12.5 MG/1
12.5 TABLET ORAL
Qty: 30 TAB | Refills: 0 | Status: SHIPPED | OUTPATIENT
Start: 2019-03-14 | End: 2019-07-02

## 2019-03-14 RX ORDER — CITALOPRAM 10 MG/1
10 TABLET ORAL DAILY
Qty: 30 TAB | Refills: 1 | Status: SHIPPED | OUTPATIENT
Start: 2019-03-14 | End: 2019-07-08

## 2019-03-14 NOTE — PROGRESS NOTES
Subjective:     HPI:   Dakotah Christina is a 40 y.o. female who presents to the office today for routine care. Vertigo: Pt complains of vertigo. She feels dizzy and off-balance. She took off work today and left early yesterday. She was prescribed Antivert which she took nightly when she was having vertigo a few months ago. She states that she stopped when she got better. She re-started Antivert yesterday. Pt had dental fillings done on Tuesday. She thinks that the dental work may have exacerbated the vertigo. She reports that she starts feeling nauseous right before an episode of vertigo. Mood swings: Pt complains of mood swings lasting 6 months. She states that her family has complained of her mood swings lately. She complains of suddenly crying for no reason. She reports no recent changes in stress level. She also complains of difficulty going to sleep. She has a history of hysterectomy and they left 1 ovary. Pt complains of hot flashes. Difficulty swallowing: Pt complains of a feeling of being unable to swallow when she is laying down. Current Outpatient Medications   Medication Sig Dispense Refill    citalopram (CELEXA) 10 mg tablet Take 1 Tab by mouth daily. 30 Tab 1    meclizine (ANTIVERT) 12.5 mg tablet Take 1 Tab by mouth three (3) times daily as needed for Dizziness. 30 Tab 0    multivitamin with iron tablet Take 1 Tab by mouth daily. No Known Allergies    Past Medical History:   Diagnosis Date    Anemia 10/13/2014    Fibroids 2016    H/O excision of dermoid cyst 2016        Past Surgical History:   Procedure Laterality Date    HX  SECTION  ,     HX CYST REMOVAL      right ovary    HX HYSTERECTOMY      due to heavy menses and uterine fibroids.      HX OOPHORECTOMY Right 2013    HX TUBAL LIGATION  2001       Family History   Problem Relation Age of Onset    Diabetes Mother     Hypertension Mother     Heart Disease Mother    [facility] Heart Disease Father     Diabetes Maternal Grandmother        Social History     Socioeconomic History    Marital status: SINGLE     Spouse name: Not on file    Number of children: Not on file    Years of education: Not on file    Highest education level: Not on file   Social Needs    Financial resource strain: Not on file    Food insecurity - worry: Not on file    Food insecurity - inability: Not on file   [employer] needs - medical: Not on file   [employer] needs - non-medical: Not on file   Occupational History    Not on file   Tobacco Use    Smoking status: Never Smoker    Smokeless tobacco: Never Used   Substance and Sexual Activity    Alcohol use: Yes     Frequency: Monthly or less     Drinks per session: 1 or 2     Binge frequency: Never     Comment: occ once a month.  Drug use: No    Sexual activity: Yes     Partners: Male     Birth control/protection: None, Surgical   Other Topics Concern    Not on file   Social History Narrative    Not on file       REVIEW OF SYSTEM:  Review of Systems   Constitutional: Negative for chills and fever. Eyes: Negative for blurred vision. Respiratory: Negative for shortness of breath. Cardiovascular: Negative for chest pain, palpitations and leg swelling. Gastrointestinal: Negative for constipation, diarrhea, nausea and vomiting. Musculoskeletal: Negative for joint pain. Neurological: Positive for dizziness. Negative for headaches. Psychiatric/Behavioral:        Mood swings       Objective:     Visit Vitals  /86 (BP 1 Location: Right arm, BP Patient Position: Sitting)   Pulse 72   Temp 98.6 °F (37 °C) (Oral)   Resp 16   Ht 5' 2\" (1.575 m)   Wt 185 lb (83.9 kg)   LMP 01/06/2017   SpO2 100%   BMI 33.84 kg/m²       PHYSICAL EXAM:  Physical Exam   Constitutional: She is oriented to person, place, and time and well-developed, well-nourished, and in no distress.    HENT:   Right Ear: Tympanic membrane, external ear and ear canal normal.   Left Ear: Tympanic membrane, external ear and ear canal normal.   Nose: Nose normal.   Mouth/Throat: Oropharynx is clear and moist.   Eyes: Pupils are equal, round, and reactive to light. Neck: Normal range of motion. Neck supple. No thyromegaly present. Cardiovascular: Normal rate, regular rhythm, normal heart sounds and intact distal pulses. No murmur heard. Pulmonary/Chest: Effort normal and breath sounds normal. She has no wheezes. Neurological: She is alert and oriented to person, place, and time. Skin: Skin is warm and dry. Vitals reviewed. Assessment/Plan:       ICD-10-CM ICD-9-CM    1. Vertigo R42 780.4 CBC WITH AUTOMATED DIFF      METABOLIC PANEL, COMPREHENSIVE      meclizine (ANTIVERT) 12.5 mg tablet   2. Stress and adjustment reaction F43.29 309.89 citalopram (CELEXA) 10 mg tablet     Patient given opportunity to ask questions. Questions answered. Letter provided today for pt's work. Patient made aware that Celexa works by altering chemical balances in the body to affect your mood. * If you feel any worsening of symptoms such as increased depression or anxiety stop the medication immediately. * If you start experiencing increased thoughts of harming yourself or someone else stop the medication immediately. * If you feel you are not able to keep yourself or those around you safe. Please call the office or got to the nearest Emergency Room immediately. Patient understands plan of care. Follow-up Disposition:  Return in about 1 month (around 4/14/2019). Written by Shon Velarde, as dictated by Cynthia Queen DO.    I, Dr. Cynthia Queen, confirm that all documentation is accurate.

## 2019-03-14 NOTE — PROGRESS NOTES
1. Have you been to the ER, urgent care clinic since your last visit? Hospitalized since your last visit? No    2. Have you seen or consulted any other health care providers outside of the [facility] since your last visit? Include any pap smears or colon screening. No     Patient presents in office today for routine care. Patient concerns: emotional, germain, vertigo.

## 2019-03-14 NOTE — PATIENT INSTRUCTIONS
this medication works by altering chemical balances in the body to affect your mood. * If you feel any worsening of symptoms such as increased depression or anxiety stop the medication immediately. * If you start experiencing increased thoughts of harming yourself or someone else stop the medication immediately. * If you feel you are not able to keep yourself or those around you safe. Please call the office or got to the nearest Emergency Room immediately. Vertigo: Exercises  Your Care Instructions  Here are some examples of typical rehabilitation exercises for your condition. Start each exercise slowly. Ease off the exercise if you start to have pain. Your doctor or physical therapist will tell you when you can start these exercises and which ones will work best for you. How to do the exercises  Exercise 1    1. Stand with a chair in front of you and a wall behind you. If you begin to fall, you may use them for support. 2. Stand with your feet together and your arms at your sides. 3. Move your head up and down 10 times. Exercise 2    1. Move your head side to side 10 times. Exercise 3    1. Move your head diagonally up and down 10 times. Exercise 4    1. Move your head diagonally up and down 10 times on the other side. Follow-up care is a key part of your treatment and safety. Be sure to make and go to all appointments, and call your doctor if you are having problems. It's also a good idea to know your test results and keep a list of the medicines you take. Where can you learn more? Go to http://cesia-spenser.info/. Enter F349 in the search box to learn more about \"Vertigo: Exercises. \"  Current as of: March 27, 2018  Content Version: 11.9  © 8096-1530 [employer]orporated. Care instructions adapted under license by [employer] (which disclaims liability or warranty for this information).  If you have questions about a medical condition or this instruction, always ask your healthcare professional. [place] [address] any warranty or liability for your use of this information. Epley Maneuver at Home for Vertigo: Exercises  Your Care Instructions  Vertigo is a spinning or whirling sensation when you move your head. Your doctor may have moved you in different positions to help your vertigo get better faster. This is called the Epley maneuver. Your doctor also may have asked you to do these exercises at home. Do the exercises as often as your doctor recommends. If your vertigo is getting worse, your doctor may have you change the exercise or stop it. Step 1  Step 1    1. Sit on the edge of a bed or sofa. Step 2    1. Turn your head 45 degrees in the direction your doctor told you to. This should be toward the ear that causes the most vertigo for you. In this picture, the woman is turning toward her left ear. Step 3    1. Tilt yourself backward until you are lying on your back. Your head should still be at a 45-degree turn. Your head should be about midway between looking straight ahead and looking out to your side. Hold for 30 seconds. If you have vertigo, stay in this position until it stops. Step 4    1. Turn your head 90 degrees toward the ear that has the least vertigo. In this picture, the woman is turning to the right because she has vertigo on her left side. The point of your chin should be raised and over your shoulder. Hold for 30 seconds. Step 5    1. Roll onto the side with the least vertigo. You should now be looking at the floor. Hold for 30 seconds. Follow-up care is a key part of your treatment and safety. Be sure to make and go to all appointments, and call your doctor if you are having problems. It's also a good idea to know your test results and keep a list of the medicines you take. Where can you learn more? Go to http://cesia-spenser.info/.   Enter H271 in the search box to learn more about \"Epley Maneuver at Home for Vertigo: Exercises. \"  Current as of: Hina 3, 2018  Content Version: 11.9  © 1889-5096 [employer]. Care instructions adapted under license by [employer] (which disclaims liability or warranty for this information). If you have questions about a medical condition or this instruction, always ask your healthcare professional. [place] [address] any warranty or liability for your use of this information.

## 2019-03-15 LAB
ALBUMIN SERPL-MCNC: 3.7 G/DL (ref 3.4–5)
ALBUMIN/GLOB SERPL: 1 {RATIO} (ref 0.8–1.7)
ALP SERPL-CCNC: 89 U/L (ref 45–117)
ALT SERPL-CCNC: 36 U/L (ref 13–56)
ANION GAP SERPL CALC-SCNC: 6 MMOL/L (ref 3–18)
AST SERPL-CCNC: 18 U/L (ref 15–37)
BILIRUB SERPL-MCNC: 0.1 MG/DL (ref 0.2–1)
BUN SERPL-MCNC: 14 MG/DL (ref 7–18)
BUN/CREAT SERPL: 17 (ref 12–20)
CALCIUM SERPL-MCNC: 8.8 MG/DL (ref 8.5–10.1)
CHLORIDE SERPL-SCNC: 103 MMOL/L (ref 100–108)
CO2 SERPL-SCNC: 28 MMOL/L (ref 21–32)
CREAT SERPL-MCNC: 0.81 MG/DL (ref 0.6–1.3)
GLOBULIN SER CALC-MCNC: 3.8 G/DL (ref 2–4)
GLUCOSE SERPL-MCNC: 64 MG/DL (ref 74–99)
POTASSIUM SERPL-SCNC: 4.3 MMOL/L (ref 3.5–5.5)
PROT SERPL-MCNC: 7.5 G/DL (ref 6.4–8.2)
SODIUM SERPL-SCNC: 137 MMOL/L (ref 136–145)

## 2019-07-02 DIAGNOSIS — R42 VERTIGO: ICD-10-CM

## 2019-07-02 RX ORDER — MECLIZINE HCL 12.5 MG 12.5 MG/1
12.5 TABLET ORAL DAILY
Qty: 30 TAB | Refills: 2 | Status: SHIPPED | OUTPATIENT
Start: 2019-07-02 | End: 2020-01-02

## 2019-07-02 NOTE — TELEPHONE ENCOUNTER
Patient contacted the office and stated that she is having dizzy spells and would like a refill on her medication, Antivert. Please advise. Thank you.

## 2019-07-08 ENCOUNTER — OFFICE VISIT (OUTPATIENT)
Dept: FAMILY MEDICINE CLINIC | Age: 44
End: 2019-07-08

## 2019-07-08 VITALS
DIASTOLIC BLOOD PRESSURE: 70 MMHG | HEART RATE: 68 BPM | WEIGHT: 187.8 LBS | RESPIRATION RATE: 20 BRPM | OXYGEN SATURATION: 99 % | SYSTOLIC BLOOD PRESSURE: 132 MMHG | BODY MASS INDEX: 34.56 KG/M2 | HEIGHT: 62 IN | TEMPERATURE: 97.5 F

## 2019-07-08 DIAGNOSIS — M25.561 ACUTE PAIN OF BOTH KNEES: ICD-10-CM

## 2019-07-08 DIAGNOSIS — M25.562 ACUTE PAIN OF BOTH KNEES: ICD-10-CM

## 2019-07-08 DIAGNOSIS — R42 VERTIGO: Primary | ICD-10-CM

## 2019-07-08 NOTE — PATIENT INSTRUCTIONS
Eating Healthy Foods: Care Instructions Your Care Instructions Eating healthy foods can help lower your risk for disease. Healthy food gives you energy and keeps your heart strong, your brain active, your muscles working, and your bones strong. A healthy diet includes a variety of foods from the basic food groups: grains, vegetables, fruits, milk and milk products, and meat and beans. Some people may eat more of their favorite foods from only one food group and, as a result, miss getting the nutrients they need. So, it is important to pay attention not only to what you eat but also to what you are missing from your diet. You can eat a healthy, balanced diet by making a few small changes. Follow-up care is a key part of your treatment and safety. Be sure to make and go to all appointments, and call your doctor if you are having problems. It's also a good idea to know your test results and keep a list of the medicines you take. How can you care for yourself at home? Look at what you eat · Keep a food diary for a week or two and record everything you eat or drink. Track the number of servings you eat from each food group. · For a balanced diet every day, eat a variety of: 
? 6 or more ounce-equivalents of grains, such as cereals, breads, crackers, rice, or pasta, every day. An ounce-equivalent is 1 slice of bread, 1 cup of ready-to-eat cereal, or ½ cup of cooked rice, cooked pasta, or cooked cereal. 
? 2½ cups of vegetables, especially: § Dark-green vegetables such as broccoli and spinach. § Orange vegetables such as carrots and sweet potatoes. § Dry beans (such as vieira and kidney beans) and peas (such as lentils). ? 2 cups of fresh, frozen, or canned fruit. A small apple or 1 banana or orange equals 1 cup. ? 3 cups of nonfat or low-fat milk, yogurt, or other milk products.  
? 5½ ounces of meat and beans, such as chicken, fish, lean meat, beans, nuts, and seeds. One egg, 1 tablespoon of peanut butter, ½ ounce nuts or seeds, or ¼ cup of cooked beans equals 1 ounce of meat. · Learn how to read food labels for serving sizes and ingredients. Fast-food and convenience-food meals often contain few or no fruits or vegetables. Make sure you eat some fruits and vegetables to make the meal more nutritious. · Look at your food diary. For each food group, add up what you have eaten and then divide the total by the number of days. This will give you an idea of how much you are eating from each food group. See if you can find some ways to change your diet to make it more healthy. Start small · Do not try to make dramatic changes to your diet all at once. You might feel that you are missing out on your favorite foods and then be more likely to fail. · Start slowly, and gradually change your habits. Try some of the following: ? Use whole wheat bread instead of white bread. ? Use nonfat or low-fat milk instead of whole milk. ? Eat brown rice instead of white rice, and eat whole wheat pasta instead of white-flour pasta. ? Try low-fat cheeses and low-fat yogurt. ? Add more fruits and vegetables to meals and have them for snacks. ? Add lettuce, tomato, cucumber, and onion to sandwiches. ? Add fruit to yogurt and cereal. 
Enjoy food · You can still eat your favorite foods. You just may need to eat less of them. If your favorite foods are high in fat, salt, and sugar, limit how often you eat them, but do not cut them out entirely. · Eat a wide variety of foods. Make healthy choices when eating out · The type of restaurant you choose can help you make healthy choices. Even fast-food chains are now offering more low-fat or healthier choices on the menu. · Choose smaller portions, or take half of your meal home. · When eating out, try: ? A veggie pizza with a whole wheat crust or grilled chicken (instead of sausage or pepperoni). ? Pasta with roasted vegetables, grilled chicken, or marinara sauce instead of cream sauce. ? A vegetable wrap or grilled chicken wrap. ? Broiled or poached food instead of fried or breaded items. Make healthy choices easy · Buy packaged, prewashed, ready-to-eat fresh vegetables and fruits, such as baby carrots, salad mixes, and chopped or shredded broccoli and cauliflower. · Buy packaged, presliced fruits, such as melon or pineapple. · Choose 100% fruit or vegetable juice instead of soda. Limit juice intake to 4 to 6 oz (½ to ¾ cup) a day. · Blend low-fat yogurt, fruit juice, and canned or frozen fruit to make a smoothie for breakfast or a snack. Where can you learn more? Go to http://cesiaAccuTherm Systemsspenser.info/. Enter T756 in the search box to learn more about \"Eating Healthy Foods: Care Instructions. \" Current as of: March 28, 2018 Content Version: 11.9 © 8987-8036 [employer]. Care instructions adapted under license by [employer] (which disclaims liability or warranty for this information). If you have questions about a medical condition or this instruction, always ask your healthcare professional. [place] [address] any warranty or liability for your use of this information. Knee Pain or Injury: Care Instructions Your Care Instructions Injuries are a common cause of knee problems. Sudden (acute) injuries may be caused by a direct blow to the knee. They can also be caused by abnormal twisting, bending, or falling on the knee. Pain, bruising, or swelling may be severe, and may start within minutes of the injury. Overuse is another cause of knee pain. Other causes are climbing stairs, kneeling, and other activities that use the knee. Everyday wear and tear, especially as you get older, also can cause knee pain.  
Rest, along with home treatment, often relieves pain and allows your knee to heal. If you have a serious knee injury, you may need tests and treatment. Follow-up care is a key part of your treatment and safety. Be sure to make and go to all appointments, and call your doctor if you are having problems. It's also a good idea to know your test results and keep a list of the medicines you take. How can you care for yourself at home? · Be safe with medicines. Read and follow all instructions on the label. ? If the doctor gave you a prescription medicine for pain, take it as prescribed. ? If you are not taking a prescription pain medicine, ask your doctor if you can take an over-the-counter medicine. · Rest and protect your knee. Take a break from any activity that may cause pain. · Put ice or a cold pack on your knee for 10 to 20 minutes at a time. Put a thin cloth between the ice and your skin. · Prop up a sore knee on a pillow when you ice it or anytime you sit or lie down for the next 3 days. Try to keep it above the level of your heart. This will help reduce swelling. · If your knee is not swollen, you can put moist heat, a heating pad, or a warm cloth on your knee. · If your doctor recommends an elastic bandage, sleeve, or other type of support for your knee, wear it as directed. · Follow your doctor's instructions about how much weight you can put on your leg. Use a cane, crutches, or a walker as instructed. · Follow your doctor's instructions about activity during your healing process. If you can do mild exercise, slowly increase your activity. · Reach and stay at a healthy weight. Extra weight can strain the joints, especially the knees and hips, and make the pain worse. Losing even a few pounds may help. When should you call for help? Call 911 anytime you think you may need emergency care. For example, call if: 
  · You have symptoms of a blood clot in your lung (called a pulmonary embolism). These may include: 
? Sudden chest pain. ? Trouble breathing. ? Coughing up blood.  
 Call your doctor now or seek immediate medical care if: 
  · You have severe or increasing pain.  
  · Your leg or foot turns cold or changes color.  
  · You cannot stand or put weight on your knee.  
  · Your knee looks twisted or bent out of shape.  
  · You cannot move your knee.  
  · You have signs of infection, such as: 
? Increased pain, swelling, warmth, or redness. ? Red streaks leading from the knee. ? Pus draining from a place on your knee. ? A fever.  
  · You have signs of a blood clot in your leg (called a deep vein thrombosis), such as: 
? Pain in your calf, back of the knee, thigh, or groin. ? Redness and swelling in your leg or groin.  
 Watch closely for changes in your health, and be sure to contact your doctor if: 
  · You have tingling, weakness, or numbness in your knee.  
  · You have any new symptoms, such as swelling.  
  · You have bruises from a knee injury that last longer than 2 weeks.  
  · You do not get better as expected. Where can you learn more? Go to http://cesia-spenser.info/. Enter K195 in the search box to learn more about \"Knee Pain or Injury: Care Instructions. \" Current as of: September 23, 2018 Content Version: 11.9 © 6915-5473 Healthwise, Incorporated. Care instructions adapted under license by [employer] (which disclaims liability or warranty for this information). If you have questions about a medical condition or this instruction, always ask your healthcare professional. [place] [address] any warranty or liability for your use of this information.

## 2019-07-08 NOTE — LETTER
NOTIFICATION RETURN TO WORK / SCHOOL 
 
7/8/2019 11:25 AM 
 
Ms. Brown Sin [address] [place] [id number] To Whom It May Concern: 
 
April C More Cheng was seen this morning at [facility]. She will return to work/school on: 7/8/19 If there are questions or concerns please have the patient contact our office.  
 
 
 
Sincerely, 
 
 
Magda Flores NP

## 2019-07-08 NOTE — PROGRESS NOTES
Grace Wise is a 40 y.o.  female and presents with    Chief Complaint   Patient presents with    Weight Management    Dizziness    Knee Pain       Subjective: Former patient of Dr. Jesse Flynn. Ms. Tamera Frost presents today with complaints of bilateral knee pain. She feels a popping sensation in both knees. She does not have pain today. She will have pain in the morning and it will subside throughout the day. Pain is not every day. She will take ibuprofen which seems to alleviate her pain. She attributes her knee pain to her weight. She knows she needs to lose weight. She is only exercising every now and then. She has tried OTC fat burners with no improvement in her weight. She is not eating well at all.    24 Hour Diet Recall:  Breakfast: Garcia, cinnamon bun, soda  Snack: chips  Lunch: ham and macaroni cheese, soda  Dinner: steak, fried shrimp, potato, mel slaw    She reports headache and vertigo frequently. She was recently diagnosed with vertigo. When she goes to bed to lay down she will feel like she is spinning. She was prescribed meclizine. She is unable to take it prior to work because it makes her sleepy. Lately she is getting vertigo on a nightly basis. She reports nausea with the vertigo. Vertigo will typically last a few minutes. Additional Concerns: No         Patient Active Problem List   Diagnosis Code    Anemia D64.9    S/P laparoscopic assisted vaginal hysterectomy (LAVH) Z90.710     Current Outpatient Medications   Medication Sig Dispense Refill    meclizine (ANTIVERT) 12.5 mg tablet Take 1 Tab by mouth daily. 30 Tab 2    multivitamin with iron tablet Take 1 Tab by mouth daily.        No Known Allergies  Past Medical History:   Diagnosis Date    Anemia 10/13/2014    Fibroids 2016    H/O excision of dermoid cyst 2016     Past Surgical History:   Procedure Laterality Date    HX  SECTION  ,     HX CYST REMOVAL      right ovary    HX HYSTERECTOMY  2016    due to heavy menses and uterine fibroids.  HX OOPHORECTOMY Right 2013    HX TUBAL LIGATION  4/2001     Family History   Problem Relation Age of Onset    Diabetes Mother     Heart Disease Mother     Heart Attack Mother     Heart Disease Father     Hypertension Father     Cancer Father         renal cancer    Diabetes Maternal Grandmother      Social History     Tobacco Use    Smoking status: Never Smoker    Smokeless tobacco: Never Used   Substance Use Topics    Alcohol use: Yes     Frequency: Monthly or less     Drinks per session: 1 or 2     Binge frequency: Never     Comment: occ once a month. ROS   History obtained from the patient  General ROS: negative for - chills or fever  ENT ROS: positive for - dizziness and headache  Respiratory ROS: no cough, shortness of breath, or wheezing  Cardiovascular ROS: no chest pain or dyspnea on exertion  Gastrointestinal ROS: no abdominal pain, change in bowel habits, or black or bloody stools  Genito-Urinary ROS: no dysuria, trouble voiding, or hematuria  Musculoskeletal ROS: positive for - pain in knee - bilateral  Neurological ROS: positive for - dizziness    All other systems reviewed and are negative.       Objective:  Vitals:    07/08/19 1012 07/08/19 1016   BP: 153/65 132/70   Pulse: 69 68   Resp: 20    Temp: 97.5 °F (36.4 °C)    TempSrc: Oral    SpO2: 99%    Weight: 187 lb 12.8 oz (85.2 kg)    Height: 5' 2\" (1.575 m)    PainSc:   0 - No pain    LMP: 01/06/2017       PE  General appearance - alert, well appearing, and in no distress and overweight  Mental status - normal mood, behavior, speech, dress, motor activity, and thought processes  Ears - bilateral TM's and external ear canals normal  Chest - clear to auscultation, no wheezes, rales or rhonchi, symmetric air entry  Heart - normal rate and regular rhythm  Musculoskeletal- crepitus noted to bilateral knees; no edema noted  Neurological - alert, oriented, normal speech, no focal findings or movement disorder noted, positive romberg  Extremities - peripheral pulses normal, no pedal edema, no clubbing or cyanosis        Assessment/Plan:    1. Vertigo  -     REFERRAL TO ENT-OTOLARYNGOLOGY    2. Acute pain of both knees       -may be early arthritis; weight loss may help; monitor    3. BMI 34.0-34.9,adult      -long discussion with patient about diet and lifestyle modification      Lab review: no lab studies available for review at time of visit        Health Maintenance:     I have discussed the diagnosis with the patient and the intended plan as seen in the above orders. The patient has received an after-visit summary and questions were answered concerning future plans. I have discussed medication side effects and warnings with the patient as well. I have reviewed the plan of care with the patient, accepted their input and they are in agreement with the treatment goals. Follow-up and Dispositions    · Return in about 6 weeks (around 8/19/2019) for follow up weight. More than 1/2 of this 25 minute visit was spent in counseling and coordination of care, as described above.     Lorna Duncna DNP, FNP-C

## 2020-01-02 ENCOUNTER — HOSPITAL ENCOUNTER (EMERGENCY)
Age: 45
Discharge: HOME | End: 2020-01-03
Attending: EMERGENCY MEDICINE | Admitting: EMERGENCY MEDICINE
Payer: MEDICAID

## 2020-01-02 DIAGNOSIS — R10.84 ABDOMINAL PAIN, GENERALIZED: Primary | ICD-10-CM

## 2020-01-02 DIAGNOSIS — R11.0 NAUSEA WITHOUT VOMITING: ICD-10-CM

## 2020-01-02 PROCEDURE — 99283 EMERGENCY DEPT VISIT LOW MDM: CPT

## 2020-01-02 PROCEDURE — 81003 URINALYSIS AUTO W/O SCOPE: CPT

## 2020-01-02 PROCEDURE — 96374 THER/PROPH/DIAG INJ IV PUSH: CPT

## 2020-01-02 PROCEDURE — 81025 URINE PREGNANCY TEST: CPT

## 2020-01-02 NOTE — LETTER
[facility] EMERGENCY DEPT 
[address] 
[address] [facility] [id number] [phone] Work/School Note Date: 1/2/2020 To Whom It May concern: 
 
Kanwal Louie was seen and treated today in the emergency room by the following provider(s): 
Attending Provider: Demetri Vargas MD. Kanwal Galeas may return to work on 1/4/2020. Sincerely, Connee Pallas, RN

## 2020-01-03 ENCOUNTER — APPOINTMENT (OUTPATIENT)
Dept: GENERAL RADIOLOGY | Age: 45
End: 2020-01-03
Attending: EMERGENCY MEDICINE
Payer: MEDICAID

## 2020-01-03 VITALS
TEMPERATURE: 98.1 F | BODY MASS INDEX: 32.94 KG/M2 | HEART RATE: 71 BPM | RESPIRATION RATE: 18 BRPM | HEIGHT: 62 IN | OXYGEN SATURATION: 99 % | WEIGHT: 179 LBS | SYSTOLIC BLOOD PRESSURE: 137 MMHG | DIASTOLIC BLOOD PRESSURE: 74 MMHG

## 2020-01-03 LAB
ALBUMIN SERPL-MCNC: 3.7 G/DL (ref 3.4–5)
ALBUMIN/GLOB SERPL: 0.9 {RATIO} (ref 0.8–1.7)
ALP SERPL-CCNC: 92 U/L (ref 45–117)
ALT SERPL-CCNC: 27 U/L (ref 13–56)
ANION GAP SERPL CALC-SCNC: 5 MMOL/L (ref 3–18)
APPEARANCE UR: CLEAR
AST SERPL-CCNC: 18 U/L (ref 10–38)
BASOPHILS # BLD: 0 K/UL (ref 0–0.1)
BASOPHILS NFR BLD: 1 % (ref 0–2)
BILIRUB DIRECT SERPL-MCNC: <0.1 MG/DL (ref 0–0.2)
BILIRUB SERPL-MCNC: 0.2 MG/DL (ref 0.2–1)
BILIRUB UR QL: NEGATIVE
BUN SERPL-MCNC: 13 MG/DL (ref 7–18)
BUN/CREAT SERPL: 13 (ref 12–20)
CALCIUM SERPL-MCNC: 9.3 MG/DL (ref 8.5–10.1)
CHLORIDE SERPL-SCNC: 105 MMOL/L (ref 100–111)
CO2 SERPL-SCNC: 28 MMOL/L (ref 21–32)
COLOR UR: YELLOW
CREAT SERPL-MCNC: 0.99 MG/DL (ref 0.6–1.3)
DIFFERENTIAL METHOD BLD: NORMAL
EOSINOPHIL # BLD: 0.2 K/UL (ref 0–0.4)
EOSINOPHIL NFR BLD: 3 % (ref 0–5)
ERYTHROCYTE [DISTWIDTH] IN BLOOD BY AUTOMATED COUNT: 12.4 % (ref 11.6–14.5)
GLOBULIN SER CALC-MCNC: 4.2 G/DL (ref 2–4)
GLUCOSE SERPL-MCNC: 76 MG/DL (ref 74–99)
GLUCOSE UR STRIP.AUTO-MCNC: NEGATIVE MG/DL
HCG UR QL: NEGATIVE
HCT VFR BLD AUTO: 41 % (ref 35–45)
HGB BLD-MCNC: 13.9 G/DL (ref 12–16)
HGB UR QL STRIP: NEGATIVE
KETONES UR QL STRIP.AUTO: NEGATIVE MG/DL
LEUKOCYTE ESTERASE UR QL STRIP.AUTO: NEGATIVE
LIPASE SERPL-CCNC: 257 U/L (ref 73–393)
LYMPHOCYTES # BLD: 2.9 K/UL (ref 0.9–3.6)
LYMPHOCYTES NFR BLD: 41 % (ref 21–52)
MCH RBC QN AUTO: 31.2 PG (ref 24–34)
MCHC RBC AUTO-ENTMCNC: 33.9 G/DL (ref 31–37)
MCV RBC AUTO: 91.9 FL (ref 74–97)
MONOCYTES # BLD: 0.6 K/UL (ref 0.05–1.2)
MONOCYTES NFR BLD: 9 % (ref 3–10)
NEUTS SEG # BLD: 3.3 K/UL (ref 1.8–8)
NEUTS SEG NFR BLD: 47 % (ref 40–73)
NITRITE UR QL STRIP.AUTO: NEGATIVE
PH UR STRIP: 7 [PH] (ref 5–8)
PLATELET # BLD AUTO: 313 K/UL (ref 135–420)
PMV BLD AUTO: 10.3 FL (ref 9.2–11.8)
POTASSIUM SERPL-SCNC: 3.6 MMOL/L (ref 3.5–5.5)
PROT SERPL-MCNC: 7.9 G/DL (ref 6.4–8.2)
PROT UR STRIP-MCNC: NEGATIVE MG/DL
RBC # BLD AUTO: 4.46 M/UL (ref 4.2–5.3)
SODIUM SERPL-SCNC: 138 MMOL/L (ref 136–145)
SP GR UR REFRACTOMETRY: 1.01 (ref 1–1.03)
UROBILINOGEN UR QL STRIP.AUTO: 0.2 EU/DL (ref 0.2–1)
WBC # BLD AUTO: 7.1 K/UL (ref 4.6–13.2)

## 2020-01-03 PROCEDURE — 74022 RADEX COMPL AQT ABD SERIES: CPT

## 2020-01-03 PROCEDURE — 85025 COMPLETE CBC W/AUTO DIFF WBC: CPT

## 2020-01-03 PROCEDURE — 80048 BASIC METABOLIC PNL TOTAL CA: CPT

## 2020-01-03 PROCEDURE — 96374 THER/PROPH/DIAG INJ IV PUSH: CPT

## 2020-01-03 PROCEDURE — 80076 HEPATIC FUNCTION PANEL: CPT

## 2020-01-03 PROCEDURE — 74011250636 HC RX REV CODE- 250/636: Performed by: EMERGENCY MEDICINE

## 2020-01-03 PROCEDURE — 83690 ASSAY OF LIPASE: CPT

## 2020-01-03 RX ORDER — MORPHINE SULFATE 4 MG/ML
4 INJECTION, SOLUTION INTRAMUSCULAR; INTRAVENOUS
Status: COMPLETED | OUTPATIENT
Start: 2020-01-03 | End: 2020-01-03

## 2020-01-03 RX ADMIN — MORPHINE SULFATE 4 MG: 4 INJECTION, SOLUTION INTRAMUSCULAR; INTRAVENOUS at 01:05

## 2020-01-03 RX ADMIN — SODIUM CHLORIDE 1000 ML: 900 INJECTION, SOLUTION INTRAVENOUS at 01:05

## 2020-01-03 NOTE — ED PROVIDER NOTES
Continue urine is [facility]eg[facility] EMERGENCY DEPT      12:58 AM    Date: 2020  Patient Name: Danna Carter    History of Presenting Illness     Chief Complaint   Patient presents with    Abdominal Pain       40 y.o. female with noted past medical history who presents to the emergency department with abdominal pain. The patient states she was in her usual state health about 3 days ago she started having some intermittent abdominal pain that she said felt like constipation. She states the pain got worse today she felt like a fullness in her entire abdomen because that she took some MiraLAX. She had a good bowel movement with that but subsequently still had the abdominal discomfort that continued. She had one episode of nausea but no vomiting. No associated diarrhea no fever chills no UTI symptoms and no vaginal discharge or bleeding. Patient has had a hysterectomy, 2 C-sections and a teratoma removal.    Patient denies any other associated signs or symptoms. Patient denies any other complaints. Nursing notes regarding the HPI and triage nursing notes were reviewed. Prior medical records were reviewed. Current Outpatient Medications   Medication Sig Dispense Refill    multivitamin with iron tablet Take 1 Tab by mouth daily. Past History     Past Medical History:  Past Medical History:   Diagnosis Date    Anemia 10/13/2014    Fibroids 2016    H/O excision of dermoid cyst 2016       Past Surgical History:  Past Surgical History:   Procedure Laterality Date    HX  SECTION  2001    HX CYST REMOVAL      right ovary    HX HYSTERECTOMY      due to heavy menses and uterine fibroids.      HX OOPHORECTOMY Right 2013    HX TUBAL LIGATION  2001       Family History:  Family History   Problem Relation Age of Onset    Diabetes Mother     Heart Disease Mother     Heart Attack Mother     Heart Disease Father     Hypertension Father     Cancer Father         renal cancer    Diabetes Maternal Grandmother        Social History:  Social History     Tobacco Use    Smoking status: Never Smoker    Smokeless tobacco: Never Used   Substance Use Topics    Alcohol use: Yes     Frequency: Monthly or less     Drinks per session: 1 or 2     Binge frequency: Never     Comment: occ once a month.  Drug use: No       Allergies:  No Known Allergies    Patient's primary care provider (as noted in EPIC):  Peace Johnson NP    Review of Systems    Visit Vitals  BP (!) 159/97 (BP 1 Location: Right arm, BP Patient Position: At rest)   Pulse 71   Temp 98.1 °F (36.7 °C)   Resp 18   Ht 5' 2\" (1.575 m)   Wt 81.2 kg (179 lb)   SpO2 98%   BMI 32.74 kg/m²       Patient Vitals for the past 12 hrs:   Temp Pulse Resp BP SpO2   01/02/20 2329 98.1 °F (36.7 °C) 71 18 (!) 159/97 98 %       PHYSICAL EXAM:    CONSTITUTIONAL:  Alert, in no apparent distress;  well developed;  well nourished. HEAD:  Normocephalic, atraumatic. EYES:  EOMI. Non-icteric sclera. Normal conjunctiva. ENTM:  Nose:  no rhinorrhea. Throat:  no erythema or exudate, mucous membranes moist.  NECK:  No JVD. Supple  RESPIRATORY:  Chest clear, equal breath sounds, good air movement. CARDIOVASCULAR:  Regular rate and rhythm. No murmurs, rubs, or gallops. GI:  Normal bowel sounds, abdomen soft with mild bilateral lower abdominal tenderness to palpation. No rebound or guarding. Negative Rovsing sign and negative McBurney's point focal tenderness. BACK:  Non-tender. UPPER EXT:  Normal inspection. LOWER EXT:  No edema, no calf tenderness. Distal pulses intact. NEURO:  Moves all four extremities, and grossly normal motor exam.  SKIN:  No rashes;  Normal for age. PSYCH:  Alert and normal affect.     DIFFERENTIAL DIAGNOSES/ MEDICAL DECISION MAKING:  Ectopic pregnancy, appendicitis, diverticulitis, constipation related pain, ovarian cyst pain, ovarian torsion, pelvic inflammatory disease, urinary tract infection, abdominal wall pain, obstruction, perforation, pyelonephritis, abscess, referred upper abdominal pain, versus combination of the above and/or numerous other processes/ etiologies. Diagnostic Study Results     Abnormal lab results from this emergency department encounter:  Labs Reviewed   HEPATIC FUNCTION PANEL - Abnormal; Notable for the following components:       Result Value    Globulin 4.2 (*)     All other components within normal limits   CBC WITH AUTOMATED DIFF   METABOLIC PANEL, BASIC   LIPASE   URINALYSIS W/ RFLX MICROSCOPIC   HCG URINE, QL       Lab values for this patient within approximately the last 12 hours:  Recent Results (from the past 12 hour(s))   URINALYSIS W/ RFLX MICROSCOPIC    Collection Time: 01/02/20 11:33 PM   Result Value Ref Range    Color YELLOW      Appearance CLEAR      Specific gravity 1.011 1.005 - 1.030      pH (UA) 7.0 5.0 - 8.0      Protein NEGATIVE  NEG mg/dL    Glucose NEGATIVE  NEG mg/dL    Ketone NEGATIVE  NEG mg/dL    Bilirubin NEGATIVE  NEG      Blood NEGATIVE  NEG      Urobilinogen 0.2 0.2 - 1.0 EU/dL    Nitrites NEGATIVE  NEG      Leukocyte Esterase NEGATIVE  NEG     CBC WITH AUTOMATED DIFF    Collection Time: 01/03/20 12:24 AM   Result Value Ref Range    WBC 7.1 4.6 - 13.2 K/uL    RBC 4.46 4.20 - 5.30 M/uL    HGB 13.9 12.0 - 16.0 g/dL    HCT 41.0 35.0 - 45.0 %    MCV 91.9 74.0 - 97.0 FL    MCH 31.2 24.0 - 34.0 PG    MCHC 33.9 31.0 - 37.0 g/dL    RDW 12.4 11.6 - 14.5 %    PLATELET 494 873 - 066 K/uL    MPV 10.3 9.2 - 11.8 FL    NEUTROPHILS 47 40 - 73 %    LYMPHOCYTES 41 21 - 52 %    MONOCYTES 9 3 - 10 %    EOSINOPHILS 3 0 - 5 %    BASOPHILS 1 0 - 2 %    ABS. NEUTROPHILS 3.3 1.8 - 8.0 K/UL    ABS. LYMPHOCYTES 2.9 0.9 - 3.6 K/UL    ABS. MONOCYTES 0.6 0.05 - 1.2 K/UL    ABS. EOSINOPHILS 0.2 0.0 - 0.4 K/UL    ABS.  BASOPHILS 0.0 0.0 - 0.1 K/UL    DF AUTOMATED     METABOLIC PANEL, BASIC    Collection Time: 01/03/20 12:24 AM   Result Value Ref Range    Sodium 138 136 - 145 mmol/L    Potassium 3.6 3.5 - 5.5 mmol/L    Chloride 105 100 - 111 mmol/L    CO2 28 21 - 32 mmol/L    Anion gap 5 3.0 - 18 mmol/L    Glucose 76 74 - 99 mg/dL    BUN 13 7.0 - 18 MG/DL    Creatinine 0.99 0.6 - 1.3 MG/DL    BUN/Creatinine ratio 13 12 - 20      GFR est AA >60 >60 ml/min/1.73m2    GFR est non-AA >60 >60 ml/min/1.73m2    Calcium 9.3 8.5 - 10.1 MG/DL   LIPASE    Collection Time: 01/03/20 12:24 AM   Result Value Ref Range    Lipase 257 73 - 393 U/L   HEPATIC FUNCTION PANEL    Collection Time: 01/03/20 12:24 AM   Result Value Ref Range    Protein, total 7.9 6.4 - 8.2 g/dL    Albumin 3.7 3.4 - 5.0 g/dL    Globulin 4.2 (H) 2.0 - 4.0 g/dL    A-G Ratio 0.9 0.8 - 1.7      Bilirubin, total 0.2 0.2 - 1.0 MG/DL    Bilirubin, direct <0.1 0.0 - 0.2 MG/DL    Alk. phosphatase 92 45 - 117 U/L    AST (SGOT) 18 10 - 38 U/L    ALT (SGPT) 27 13 - 56 U/L       Radiologist and cardiologist interpretations if available at time of this note:  No results found. Medication(s) ordered for patient during this emergency visit encounter:  Medications   morphine injection 4 mg (4 mg IntraVENous Given 1/3/20 0105)   sodium chloride 0.9 % bolus infusion 1,000 mL (1,000 mL IntraVENous New Bag 1/3/20 0105)       Medical Decision Making     I am the first provider for this patient. I reviewed the vital signs, available nursing notes, past medical history, past surgical history, family history and social history. Vital Signs:  Reviewed the patient's vital signs. ED COURSE AND MEDICAL DECISION MAKING:    The patient's pain improved in the ED with the noted medications. On reassessment of the patient, the patient continues to have no surgical abdomen with no rebound nor guarding. The patient does not appear septic by presentation, vital signs and laboratory results. The patient continues to appear non-toxic in the emergency department on reevaluations.      IMPRESSION AND MEDICAL DECISION MAKING:  Urine pregnancy test was ordered. Based upon the patient's presentation with noted HPI and PE, along with the work   up done in the emergency department, I believe that the patient is having abdominal pain of uncertain etiology. However, I do believe that the patient is stable and can be discharged home with further outpatient evaluation of the abdominal pain by her primary doctor. 1. Abdominal pain. SPECIFIC PATIENT INSTRUCTIONS FROM THE PHYSICIAN WHO TREATED YOU IN THE ER TODAY:  1. Return if any concerns or worsening of condition(s)  2. Follow up with your primary doctor in the next 2-4 days for reevaluation. Patient is improved, resting quietly and comfortably. The patient will be discharged home. The patient was reassured that these symptoms do not appear to represent a serious or life threatening condition at this time. Warning signs of worsening condition were discussed and understood by the patient. Based on patient's age, coexisting illness, exam, and the results of this ED evaluation, the decision to treat as an outpatient was made. Based on the information available at time of discharge, acute pathology requiring immediate intervention was deemed relative unlikely. While it is impossible to completely exclude the possibility of underlying serious disease or worsening of condition, I feel the relative likelihood is extremely low. I discussed this uncertainty with the patient, who understood ED evaluation and treatment and felt comfortable with the outpatient treatment plan. All questions regarding care, test results, and follow up were answered. The patient is stable and appropriate to discharge. They understand that they should return to the emergency department for any new or worsening symptoms. I stressed the importance of follow up for repeat assessment and possibly further evaluation/treatment.     Dictation disclaimer:  Please note that this dictation was completed with Dragon, the computer voice recognition software. Quite often unanticipated grammatical, syntax, homophones, and other interpretive errors are inadvertently transcribed by the computer software. Please disregard these errors. Please excuse any errors that have escaped final proofreading. Coding Diagnoses     Clinical Impression:   1. Abdominal pain, generalized    2. Nausea without vomiting        Disposition     Disposition: Discharge. Darel Bray L. Lafayette Hodgkins, M.D. AMIE Board Certified Emergency Physician    Provider Attestation:  If a scribe was utilized in generation of this patient record, I personally performed the services described in the documentation, reviewed the documentation, as recorded by the scribe in my presence, and it accurately records the patient's history of presenting illness, review of systems, patient physical examination, and procedures performed by me as the attending physician. Darel Bray L. Lafayette Hodgkins, M.D. ABEM Board Certified Emergency Physician  1/2/2020.  1:34 AM

## 2020-01-03 NOTE — DISCHARGE INSTRUCTIONS
SPECIFIC PATIENT INSTRUCTIONS FROM THE PHYSICIAN WHO TREATED YOU IN THE ER TODAY:  1. Return if any concerns or worsening of condition(s)  2. Follow up with your primary doctor in the next 2-4 days for reevaluation. Patient Education        Abdominal Pain: Care Instructions  Your Care Instructions    Abdominal pain has many possible causes. Some aren't serious and get better on their own in a few days. Others need more testing and treatment. If your pain continues or gets worse, you need to be rechecked and may need more tests to find out what is wrong. You may need surgery to correct the problem. Don't ignore new symptoms, such as fever, nausea and vomiting, urination problems, pain that gets worse, and dizziness. These may be signs of a more serious problem. Your doctor may have recommended a follow-up visit in the next 8 to 12 hours. If you are not getting better, you may need more tests or treatment. The doctor has checked you carefully, but problems can develop later. If you notice any problems or new symptoms, get medical treatment right away. Follow-up care is a key part of your treatment and safety. Be sure to make and go to all appointments, and call your doctor if you are having problems. It's also a good idea to know your test results and keep a list of the medicines you take. How can you care for yourself at home? · Rest until you feel better. · To prevent dehydration, drink plenty of fluids, enough so that your urine is light yellow or clear like water. Choose water and other caffeine-free clear liquids until you feel better. If you have kidney, heart, or liver disease and have to limit fluids, talk with your doctor before you increase the amount of fluids you drink. · If your stomach is upset, eat mild foods, such as rice, dry toast or crackers, bananas, and applesauce. Try eating several small meals instead of two or three large ones.   · Wait until 48 hours after all symptoms have gone away before you have spicy foods, alcohol, and drinks that contain caffeine. · Do not eat foods that are high in fat. · Avoid anti-inflammatory medicines such as aspirin, ibuprofen (Advil, Motrin), and naproxen (Aleve). These can cause stomach upset. Talk to your doctor if you take daily aspirin for another health problem. When should you call for help? Call 911 anytime you think you may need emergency care. For example, call if:    · You passed out (lost consciousness).     · You pass maroon or very bloody stools.     · You vomit blood or what looks like coffee grounds.     · You have new, severe belly pain.    Call your doctor now or seek immediate medical care if:    · Your pain gets worse, especially if it becomes focused in one area of your belly.     · You have a new or higher fever.     · Your stools are black and look like tar, or they have streaks of blood.     · You have unexpected vaginal bleeding.     · You have symptoms of a urinary tract infection. These may include:  ? Pain when you urinate. ? Urinating more often than usual.  ? Blood in your urine.     · You are dizzy or lightheaded, or you feel like you may faint.    Watch closely for changes in your health, and be sure to contact your doctor if:    · You are not getting better after 1 day (24 hours). Where can you learn more? Go to http://cesia-spenser.info/. Enter X459 in the search box to learn more about \"Abdominal Pain: Care Instructions. \"  Current as of: June 26, 2019  Content Version: 12.2  © 0417-5471 [employer]. Care instructions adapted under license by [employer] (which disclaims liability or warranty for this information). If you have questions about a medical condition or this instruction, always ask your healthcare professional. [place] [address] any warranty or liability for your use of this information.          Patient Education        Nausea and Vomiting: Care Instructions  Your Care Instructions    When you are nauseated, you may feel weak and sweaty and notice a lot of saliva in your mouth. Nausea often leads to vomiting. Most of the time you do not need to worry about nausea and vomiting, but they can be signs of other illnesses. Two common causes of nausea and vomiting are stomach flu and food poisoning. Nausea and vomiting from viral stomach flu will usually start to improve within 24 hours. Nausea and vomiting from food poisoning may last from 12 to 48 hours. The doctor has checked you carefully, but problems can develop later. If you notice any problems or new symptoms, get medical treatment right away. Follow-up care is a key part of your treatment and safety. Be sure to make and go to all appointments, and call your doctor if you are having problems. It's also a good idea to know your test results and keep a list of the medicines you take. How can you care for yourself at home? · To prevent dehydration, drink plenty of fluids, enough so that your urine is light yellow or clear like water. Choose water and other caffeine-free clear liquids until you feel better. If you have kidney, heart, or liver disease and have to limit fluids, talk with your doctor before you increase the amount of fluids you drink. · Rest in bed until you feel better. · When you are able to eat, try clear soups, mild foods, and liquids until all symptoms are gone for 12 to 48 hours. Other good choices include dry toast, crackers, cooked cereal, and gelatin dessert, such as Jell-O. When should you call for help? Call 911 anytime you think you may need emergency care. For example, call if:    · You passed out (lost consciousness).    Call your doctor now or seek immediate medical care if:    · You have symptoms of dehydration, such as:  ? Dry eyes and a dry mouth. ? Passing only a little dark urine. ?  Feeling thirstier than usual.     · You have new or worsening belly pain.     · You have a new or higher fever.     · You vomit blood or what looks like coffee grounds.    Watch closely for changes in your health, and be sure to contact your doctor if:    · You have ongoing nausea and vomiting.     · Your vomiting is getting worse.     · Your vomiting lasts longer than 2 days.     · You are not getting better as expected. Where can you learn more? Go to http://cesia-spenser.info/. Enter 98 220873 in the search box to learn more about \"Nausea and Vomiting: Care Instructions. \"  Current as of: 2019  Content Version: 12.2  © 9787-3803 [employer]. Care instructions adapted under license by [employer] (which disclaims liability or warranty for this information). If you have questions about a medical condition or this instruction, always ask your healthcare professional. [address] any warranty or liability for your use of this information. Snappy shuttle Activation    Thank you for requesting access to Snappy shuttle. Please follow the instructions below to securely access and download your online medical record. Snappy shuttle allows you to send messages to your doctor, view your test results, renew your prescriptions, schedule appointments, and more. How Do I Sign Up? In your internet browser, go to [email or web address]. Click on the First Time User? Click Here link in the Sign In box. You will see the New Member Sign Up page. Enter your Snappy shuttle Access Code exactly as it appears below. You will not need to use this code after you´ve completed the sign-up process. If you do not sign up before the expiration date, you must request a new code. Snappy shuttle Access Code: [email or web address]  Expires: 3/28/2019  2:27 PM (This is the date your Snappy shuttle access code will )    Enter the last four digits of your Social Security Number (xxxx) and Date of Birth (mm/dd/yyyy) as indicated and click Submit.  You will be taken to the next sign-up page. Create a [employer] ID. This will be your [employer] login ID and cannot be changed, so think of one that is secure and easy to remember. Create a [employer] password. You can change your password at any time. Enter your Password Reset Question and Answer. This can be used at a later time if you forget your password. Enter your e-mail address. You will receive e-mail notification when new information is available in [address] Click Sign Up. You can now view and download portions of your medical record. Click the [employer]city link to download a portable copy of your medical information. Additional Information    If you have questions, please visit the Frequently Asked Questions section of the [employer] website at [email or web address] Remember, [employer] is NOT to be used for urgent needs. For medical emergencies, dial 911.

## 2020-01-03 NOTE — ED NOTES
[facility] EMERGENCY DEPT      3:19 AM    Date: 2020  Patient Name: Leni Palomo    History of Presenting Illness     Chief Complaint   Patient presents with    Abdominal Pain       40 y.o. female with noted past medical history who presents to the emergency department with lower abdominal pain. The patient states that about 3 days ago started having some lower abdominal pain that has been fairly constant to the present. She states that she initially thought that it may have been some constipation took some MiraLAX but has had bowel movement since then and still has the bloating. She does state that she has some urinary frequency as well. No vaginal discharge or bleeding. She has no other associated symptoms to include no nausea, no vomiting, no diarrhea and no fever or chills. Patient denies any other associated signs or symptoms. Patient denies any other complaints. Nursing notes regarding the HPI and triage nursing notes were reviewed. Prior medical records were reviewed. Current Outpatient Medications   Medication Sig Dispense Refill    multivitamin with iron tablet Take 1 Tab by mouth daily. Past History     Past Medical History:  Past Medical History:   Diagnosis Date    Anemia 10/13/2014    Fibroids 2016    H/O excision of dermoid cyst 2016       Past Surgical History:  Past Surgical History:   Procedure Laterality Date    HX  SECTION  2001    HX CYST REMOVAL      right ovary    HX HYSTERECTOMY      due to heavy menses and uterine fibroids.      HX OOPHORECTOMY Right     HX TUBAL LIGATION  2001       Family History:  Family History   Problem Relation Age of Onset    Diabetes Mother     Heart Disease Mother     Heart Attack Mother     Heart Disease Father     Hypertension Father     Cancer Father         renal cancer    Diabetes Maternal Grandmother        Social History:  Social History     Tobacco Use  Smoking status: Never Smoker    Smokeless tobacco: Never Used   Substance Use Topics    Alcohol use: Yes     Frequency: Monthly or less     Drinks per session: 1 or 2     Binge frequency: Never     Comment: occ once a month.  Drug use: No       Allergies:  No Known Allergies    Patient's primary care provider (as noted in EPIC):  Paris Small NP    Review of Systems   Constitutional: Negative for diaphoresis. HENT: Negative for congestion. Eyes: Negative for discharge. Respiratory: Negative for stridor. Cardiovascular: Negative for palpitations. Gastrointestinal: Positive for abdominal pain. Negative for diarrhea, nausea and vomiting. Endocrine: Negative for heat intolerance. Genitourinary: Positive for frequency. Negative for dysuria, flank pain, urgency, vaginal bleeding, vaginal discharge and vaginal pain. Musculoskeletal: Negative for back pain. Neurological: Negative for weakness. Psychiatric/Behavioral: Negative for hallucinations. All other systems reviewed and are negative. Visit Vitals  /74   Pulse 71   Temp 98.1 °F (36.7 °C)   Resp 18   Ht 5' 2\" (1.575 m)   Wt 81.2 kg (179 lb)   SpO2 99%   BMI 32.74 kg/m²       Patient Vitals for the past 12 hrs:   Temp Pulse Resp BP SpO2   01/03/20 0130    137/74 99 %   01/03/20 0100    133/69 100 %   01/02/20 2329 98.1 °F (36.7 °C) 71 18 (!) 159/97 98 %       PHYSICAL EXAM:    CONSTITUTIONAL:  Alert, in no apparent distress;  well developed;  well nourished. HEAD:  Normocephalic, atraumatic. EYES:  EOMI. Non-icteric sclera. Normal conjunctiva. ENTM:  Nose:  no rhinorrhea. Throat:  no erythema or exudate, mucous membranes moist.  NECK:  No JVD. Supple  RESPIRATORY:  Chest clear, equal breath sounds, good air movement. CARDIOVASCULAR:  Regular rate and rhythm. No murmurs, rubs, or gallops. GI:  Normal bowel sounds, abdomen soft with mild bilateral lower abdominal tenderness to palpation.   No rebound or guarding. BACK:  Non-tender. UPPER EXT:  Normal inspection. LOWER EXT:  No edema, no calf tenderness. Distal pulses intact. NEURO:  Moves all four extremities, and grossly normal motor exam.  SKIN:  No rashes;  Normal for age. PSYCH:  Alert and normal affect. DIFFERENTIAL DIAGNOSES/ MEDICAL DECISION MAKING:  Ectopic pregnancy, appendicitis, diverticulitis, constipation related pain, ovarian cyst pain, ovarian torsion, pelvic inflammatory disease, urinary tract infection, abdominal wall pain, obstruction, perforation, pyelonephritis, abscess, referred upper abdominal pain, versus combination of the above and/or numerous other processes/ etiologies.     Diagnostic Study Results     Abnormal lab results from this emergency department encounter:  Labs Reviewed   HEPATIC FUNCTION PANEL - Abnormal; Notable for the following components:       Result Value    Globulin 4.2 (*)     All other components within normal limits   CBC WITH AUTOMATED DIFF   METABOLIC PANEL, BASIC   LIPASE   URINALYSIS W/ RFLX MICROSCOPIC   HCG URINE, QL       Lab values for this patient within approximately the last 12 hours:  Recent Results (from the past 12 hour(s))   URINALYSIS W/ RFLX MICROSCOPIC    Collection Time: 01/02/20 11:33 PM   Result Value Ref Range    Color YELLOW      Appearance CLEAR      Specific gravity 1.011 1.005 - 1.030      pH (UA) 7.0 5.0 - 8.0      Protein NEGATIVE  NEG mg/dL    Glucose NEGATIVE  NEG mg/dL    Ketone NEGATIVE  NEG mg/dL    Bilirubin NEGATIVE  NEG      Blood NEGATIVE  NEG      Urobilinogen 0.2 0.2 - 1.0 EU/dL    Nitrites NEGATIVE  NEG      Leukocyte Esterase NEGATIVE  NEG     CBC WITH AUTOMATED DIFF    Collection Time: 01/03/20 12:24 AM   Result Value Ref Range    WBC 7.1 4.6 - 13.2 K/uL    RBC 4.46 4.20 - 5.30 M/uL    HGB 13.9 12.0 - 16.0 g/dL    HCT 41.0 35.0 - 45.0 %    MCV 91.9 74.0 - 97.0 FL    MCH 31.2 24.0 - 34.0 PG    MCHC 33.9 31.0 - 37.0 g/dL    RDW 12.4 11.6 - 14.5 %    PLATELET 972 661 - 420 K/uL    MPV 10.3 9.2 - 11.8 FL    NEUTROPHILS 47 40 - 73 %    LYMPHOCYTES 41 21 - 52 %    MONOCYTES 9 3 - 10 %    EOSINOPHILS 3 0 - 5 %    BASOPHILS 1 0 - 2 %    ABS. NEUTROPHILS 3.3 1.8 - 8.0 K/UL    ABS. LYMPHOCYTES 2.9 0.9 - 3.6 K/UL    ABS. MONOCYTES 0.6 0.05 - 1.2 K/UL    ABS. EOSINOPHILS 0.2 0.0 - 0.4 K/UL    ABS. BASOPHILS 0.0 0.0 - 0.1 K/UL    DF AUTOMATED     METABOLIC PANEL, BASIC    Collection Time: 01/03/20 12:24 AM   Result Value Ref Range    Sodium 138 136 - 145 mmol/L    Potassium 3.6 3.5 - 5.5 mmol/L    Chloride 105 100 - 111 mmol/L    CO2 28 21 - 32 mmol/L    Anion gap 5 3.0 - 18 mmol/L    Glucose 76 74 - 99 mg/dL    BUN 13 7.0 - 18 MG/DL    Creatinine 0.99 0.6 - 1.3 MG/DL    BUN/Creatinine ratio 13 12 - 20      GFR est AA >60 >60 ml/min/1.73m2    GFR est non-AA >60 >60 ml/min/1.73m2    Calcium 9.3 8.5 - 10.1 MG/DL   LIPASE    Collection Time: 01/03/20 12:24 AM   Result Value Ref Range    Lipase 257 73 - 393 U/L   HEPATIC FUNCTION PANEL    Collection Time: 01/03/20 12:24 AM   Result Value Ref Range    Protein, total 7.9 6.4 - 8.2 g/dL    Albumin 3.7 3.4 - 5.0 g/dL    Globulin 4.2 (H) 2.0 - 4.0 g/dL    A-G Ratio 0.9 0.8 - 1.7      Bilirubin, total 0.2 0.2 - 1.0 MG/DL    Bilirubin, direct <0.1 0.0 - 0.2 MG/DL    Alk. phosphatase 92 45 - 117 U/L    AST (SGOT) 18 10 - 38 U/L    ALT (SGPT) 27 13 - 56 U/L       Radiologist and cardiologist interpretations if available at time of this note:  No results found. Medication(s) ordered for patient during this emergency visit encounter:  Medications   morphine injection 4 mg (4 mg IntraVENous Given 1/3/20 0105)   sodium chloride 0.9 % bolus infusion 1,000 mL (1,000 mL IntraVENous New Bag 1/3/20 0105)       Medical Decision Making     I am the first provider for this patient. I reviewed the vital signs, available nursing notes, past medical history, past surgical history, family history and social history.     Vital Signs:  Reviewed the patient's vital signs.         ED COURSE AND MEDICAL DECISION MAKING:    The patient's pain improved in the ED with the noted medications. On reassessment of the patient, the patient continues to have no surgical abdomen with no rebound nor guarding. The patient does not appear septic by presentation, vital signs and laboratory results. The patient continues to appear non-toxic in the emergency department on reevaluations. IMPRESSION AND MEDICAL DECISION MAKING:  Urine pregnancy test was ordered. Based upon the patient's presentation with noted HPI and PE, along with the work   up done in the emergency department, I believe that the patient is having abdominal pain of uncertain etiology. However, I do believe that the patient is stable and can be discharged home with further outpatient evaluation of the abdominal pain by her primary doctor. 1. Abdominal pain. SPECIFIC PATIENT INSTRUCTIONS FROM THE PHYSICIAN WHO TREATED YOU IN THE ER TODAY:  1. Return if any concerns or worsening of condition(s)  2. Norco for pain not controlled with over the counter ibuprofen. 3. Follow up with your primary doctor in the next 2-4 days for reevaluation. Patient is improved, resting quietly and comfortably. The patient will be discharged home. The patient was reassured that these symptoms do not appear to represent a serious or life threatening condition at this time. Warning signs of worsening condition were discussed and understood by the patient. Based on patient's age, coexisting illness, exam, and the results of this ED evaluation, the decision to treat as an outpatient was made. Based on the information available at time of discharge, acute pathology requiring immediate intervention was deemed relative unlikely. While it is impossible to completely exclude the possibility of underlying serious disease or worsening of condition, I feel the relative likelihood is extremely low.  I discussed this uncertainty with the patient, who understood ED evaluation and treatment and felt comfortable with the outpatient treatment plan. All questions regarding care, test results, and follow up were answered. The patient is stable and appropriate to discharge. They understand that they should return to the emergency department for any new or worsening symptoms. I stressed the importance of follow up for repeat assessment and possibly further evaluation/treatment. Dictation disclaimer:  Please note that this dictation was completed with [employer], the computer voice recognition software. Quite often unanticipated grammatical, syntax, homophones, and other interpretive errors are inadvertently transcribed by the computer software. Please disregard these errors. Please excuse any errors that have escaped final proofreading. Coding Diagnoses     Clinical Impression:   1. Abdominal pain, generalized    2. Nausea without vomiting        Disposition     Disposition: Abdominal pain    Harvinder Mccarthy M.D. AMIE Board Certified Emergency Physician    Provider Attestation:  If a scribe was utilized in generation of this patient record, I personally performed the services described in the documentation, reviewed the documentation, as recorded by the scribe in my presence, and it accurately records the patient's history of presenting illness, review of systems, patient physical examination, and procedures performed by me as the attending physician. Ailyn Mccarthy M.D.   AMIE Board Certified Emergency Physician  1/2/2020.  3:22 AM

## 2020-01-03 NOTE — ED TRIAGE NOTES
Pt ambulatory to triage c/o lower abd discomfort x3 days. States she thought she was constipated, took miralax and had full bowel movement today with no relief. States constant full/bloated pain with shooting sharp pains. Some nausea, no vomiting. States had this pain before and had to have surgery to remove ovarian cyst.    Pt also states some urinary frequency with sensation of incomplete emptying of bladder. Denies vaginal discharge or bleeding.

## 2020-01-09 ENCOUNTER — OFFICE VISIT (OUTPATIENT)
Dept: FAMILY MEDICINE CLINIC | Age: 45
End: 2020-01-09

## 2020-01-09 VITALS
RESPIRATION RATE: 18 BRPM | OXYGEN SATURATION: 99 % | HEART RATE: 67 BPM | HEIGHT: 62 IN | BODY MASS INDEX: 33.13 KG/M2 | DIASTOLIC BLOOD PRESSURE: 85 MMHG | TEMPERATURE: 98.1 F | WEIGHT: 180 LBS | SYSTOLIC BLOOD PRESSURE: 132 MMHG

## 2020-01-09 DIAGNOSIS — Z12.39 BREAST CANCER SCREENING: ICD-10-CM

## 2020-01-09 DIAGNOSIS — R42 VERTIGO: ICD-10-CM

## 2020-01-09 DIAGNOSIS — R53.83 FATIGUE, UNSPECIFIED TYPE: Primary | ICD-10-CM

## 2020-01-09 DIAGNOSIS — K59.00 CONSTIPATION, UNSPECIFIED CONSTIPATION TYPE: ICD-10-CM

## 2020-01-09 NOTE — PATIENT INSTRUCTIONS
Fatigue: Care Instructions  Your Care Instructions    Fatigue is a feeling of tiredness, exhaustion, or lack of energy. You may feel fatigue because of too much or not enough activity. It can also come from stress, lack of sleep, boredom, and poor diet. Many medical problems, such as viral infections, can cause fatigue. Emotional problems, especially depression, are often the cause of fatigue. Fatigue is most often a symptom of another problem. Treatment for fatigue depends on the cause. For example, if you have fatigue because you have a certain health problem, treating this problem also treats your fatigue. If depression or anxiety is the cause, treatment may help. Follow-up care is a key part of your treatment and safety. Be sure to make and go to all appointments, and call your doctor if you are having problems. It's also a good idea to know your test results and keep a list of the medicines you take. How can you care for yourself at home? · Get regular exercise. But don't overdo it. Go back and forth between rest and exercise. · Get plenty of rest.  · Eat a healthy diet. Do not skip meals, especially breakfast.  · Reduce your use of caffeine, tobacco, and alcohol. Caffeine is most often found in coffee, tea, cola drinks, and chocolate. · Limit medicines that can cause fatigue. This includes tranquilizers and cold and allergy medicines. When should you call for help? Watch closely for changes in your health, and be sure to contact your doctor if:    · You have new symptoms such as fever or a rash.     · Your fatigue gets worse.     · You have been feeling down, depressed, or hopeless. Or you may have lost interest in things that you usually enjoy.     · You are not getting better as expected. Where can you learn more? Go to http://cesia-spenser.info/. Enter C367 in the search box to learn more about \"Fatigue: Care Instructions. \"  Current as of: June 26, 2019  Content Version: 12.2  © 3640-4365 [employer]. Care instructions adapted under license by [employer] (which disclaims liability or warranty for this information). If you have questions about a medical condition or this instruction, always ask your healthcare professional. [address] any warranty or liability for your use of this information.

## 2020-01-09 NOTE — PROGRESS NOTES
Anna Sumaya is a 40 y.o.  female and presents with    Chief Complaint   Patient presents with    Dizziness    Fatigue       Subjective:  Ms. Scott Mckenzie presents today with complaints of dizziness, fatigue, and nausea- not active at this time. She went to the emergency room on 20 with the above complaints in addition to abdominal pain and constipation. Chest xray/abd xray was negative for bowel obstruction. She was given Norco for severe pain. She was discharged. Since then she has been taking OTC Miralax to have a bowel movement. Last bowel movement was last night and abdominal pain has completely resolved. She does report since her hysterectomy her bowel movements have been abnormal.      Additional Concerns: No        Patient Active Problem List   Diagnosis Code    Anemia D64.9    S/P laparoscopic assisted vaginal hysterectomy (LAVH) Z90.710     Current Outpatient Medications   Medication Sig Dispense Refill    multivitamin with iron tablet Take 1 Tab by mouth daily. No Known Allergies  Past Medical History:   Diagnosis Date    Anemia 10/13/2014    Fibroids 2016    H/O excision of dermoid cyst 2016     Past Surgical History:   Procedure Laterality Date    HX  SECTION  ,     HX CYST REMOVAL      right ovary    HX HYSTERECTOMY  2016    due to heavy menses and uterine fibroids.      HX OOPHORECTOMY Right 2013    HX TUBAL LIGATION  2001     Family History   Problem Relation Age of Onset    Diabetes Mother     Heart Disease Mother     Heart Attack Mother     Heart Disease Father     Hypertension Father     Cancer Father         renal cancer    Diabetes Maternal Grandmother      Social History     Tobacco Use    Smoking status: Never Smoker    Smokeless tobacco: Never Used   Substance Use Topics    Alcohol use: Yes     Frequency: Monthly or less     Drinks per session: 1 or 2     Binge frequency: Never     Comment: occ once a month.        ROS   History obtained from the patient  General ROS: positive for  - fatigue  Ophthalmic ROS: positive for - blurry vision  ENT ROS: positive for - vertigo  Respiratory ROS: no cough, shortness of breath, or wheezing  Cardiovascular ROS: no chest pain or dyspnea on exertion  Gastrointestinal ROS: no abdominal pain, change in bowel habits, or black or bloody stools  Genito-Urinary ROS: no dysuria, trouble voiding, or hematuria    All other systems reviewed and are negative. Objective:  Vitals:    01/09/20 0912   BP: 132/85   Pulse: 67   Resp: 18   Temp: 98.1 °F (36.7 °C)   TempSrc: Oral   SpO2: 99%   Weight: 180 lb (81.6 kg)   Height: 5' 2\" (1.575 m)   PainSc:   0 - No pain   LMP: 01/06/2017       PE  General appearance - alert, well appearing, and in no distress  Mental status - normal mood, behavior, speech, dress, motor activity, and thought processes  Chest - clear to auscultation, no wheezes, rales or rhonchi, symmetric air entry  Heart - normal rate and regular rhythm  Abdomen - soft, nontender, nondistended, no masses or organomegaly  Extremities - peripheral pulses normal, no pedal edema, no clubbing or cyanosis      LABS   Lab Results   Component Value Date/Time    WBC 7.1 01/03/2020 12:24 AM    HGB 13.9 01/03/2020 12:24 AM    HCT 41.0 01/03/2020 12:24 AM    PLATELET 062 37/09/6722 12:24 AM    MCV 91.9 01/03/2020 12:24 AM     Lab Results   Component Value Date/Time    ALT (SGPT) 27 01/03/2020 12:24 AM    AST (SGOT) 18 01/03/2020 12:24 AM    Alk. phosphatase 92 01/03/2020 12:24 AM    Bilirubin, direct <0.1 01/03/2020 12:24 AM    Bilirubin, total 0.2 01/03/2020 12:24 AM    Albumin 3.7 01/03/2020 12:24 AM    Protein, total 7.9 01/03/2020 12:24 AM    PLATELET 843 58/96/8162 12:24 AM    Hepatitis B surface Ag . 15 07/17/2012 09:24 AM     Lab Results   Component Value Date/Time    Sodium 138 01/03/2020 12:24 AM    Potassium 3.6 01/03/2020 12:24 AM    Chloride 105 01/03/2020 12:24 AM    CO2 28 01/03/2020 12:24 AM    Anion gap 5 01/03/2020 12:24 AM    Glucose 76 01/03/2020 12:24 AM    BUN 13 01/03/2020 12:24 AM    Creatinine 0.99 01/03/2020 12:24 AM    BUN/Creatinine ratio 13 01/03/2020 12:24 AM    GFR est AA >60 01/03/2020 12:24 AM    GFR est non-AA >60 01/03/2020 12:24 AM    Calcium 9.3 01/03/2020 12:24 AM    Bilirubin, total 0.2 01/03/2020 12:24 AM    ALT (SGPT) 27 01/03/2020 12:24 AM    AST (SGOT) 18 01/03/2020 12:24 AM    Alk. phosphatase 92 01/03/2020 12:24 AM    Protein, total 7.9 01/03/2020 12:24 AM    Albumin 3.7 01/03/2020 12:24 AM    Globulin 4.2 (H) 01/03/2020 12:24 AM    A-G Ratio 0.9 01/03/2020 12:24 AM      Lab Results   Component Value Date/Time    Lipase 257 01/03/2020 12:24 AM       TESTS  XR Results (most recent):  Results from Hospital Encounter encounter on 01/02/20   XR ABD ACUTE W 1 V CHEST    Narrative EXAM: XR ABD ACUTE W 1 V CHEST    CLINICAL INDICATION/HISTORY: Abdominal pain    COMPARISON: 3/11/2013. TECHNIQUE: Frontal view of the chest along with flat and upright views of the  abdomen were acquired  _______________    FINDINGS:    CHEST: Cardiac and mediastinal silhouette are within normal limits. The  pulmonary vasculature is unremarkable. The lungs are clear with no  consolidation, effusion, or pneumothorax. No free intraperitoneal air is seen  under the diaphragm. ABDOMEN: Normal bowel gas pattern. No air distended bowel loops nor other  evidence of bowel obstruction or bowel dilatation. Moderate fecal material is  seen throughout the colon, most suggestive of constipation. Gallstones overlie right upper quadrant. The soft tissue planes and bony  structures appear otherwise unremarkable.      _______________      Impression IMPRESSION:    No bowel obstruction or other acute findings. Assessment/Plan:    1. Fatigue, unspecified type  -     VITAMIN D, 25 HYDROXY; Future  -     VITAMIN B12 & FOLATE; Future  -     TSH AND FREE T4; Future    2. Vertigo       - BPPV; intermittent; avoid sudden head movements; return if symptoms persist or worsen    3. Breast cancer screening  -     [facility] MAMMO BI SCREENING INCL CAD; Future    4. Constipation, unspecified constipation type       -continue with OTC fiber supplements; increase water intake       Lab review: orders written for new lab studies as appropriate; see orders        Health Maintenance:   Influenza Vaccine -declined     I have discussed the diagnosis with the patient and the intended plan as seen in the above orders. The patient has received an after-visit summary and questions were answered concerning future plans. I have discussed medication side effects and warnings with the patient as well. I have reviewed the plan of care with the patient, accepted their input and they are in agreement with the treatment goals. Follow-up and Dispositions    · Return in about 6 weeks (around 2/20/2020) for well woman exam/pelvic exam no PAP. More than 1/2 of this 25 minute visit was spent in counseling and coordination of care, as described above.     Brody Germain, MARCELL, FNP-C

## 2020-01-09 NOTE — PROGRESS NOTES
Jc Mantilla is a 40 y.o. female  Chief Complaint   Patient presents with    Dizziness    Fatigue     1. Have you been to the ER, urgent care clinic since your last visit? Hospitalized since your last visit? Yes Reason for visit: depaul,stomach pain,1/2/20    2. Have you seen or consulted any other health care providers outside of the [address] [place] [address] since your last visit? Include any pap smears or colon screening.  No

## 2020-01-10 LAB
25(OH)D3 SERPL-MCNC: 23.8 NG/ML (ref 32–100)
FOLATE,FOL: >20 NG/ML
T4 FREE SERPL-MCNC: 1.3 NG/DL (ref 0.9–1.8)
TSH SERPL DL<=0.005 MIU/L-ACNC: 0.53 MCU/ML (ref 0.27–4.2)
VIT B12 SERPL-MCNC: 1009 PG/ML (ref 211–911)

## 2020-01-15 ENCOUNTER — TELEPHONE (OUTPATIENT)
Dept: FAMILY MEDICINE CLINIC | Age: 45
End: 2020-01-15

## 2020-01-15 NOTE — PROGRESS NOTES
Please make patient aware that labs were stable. Vitamin D was slightly decreased. She does not need prescription medication but I do recommend OTC Vitamin D 1000 units daily.     Thanks

## 2020-01-15 NOTE — TELEPHONE ENCOUNTER
Call placed to inform of lab results. Work #  was not in service and cell # stated that the voice mail was not set up on phone yet.

## 2020-02-20 ENCOUNTER — OFFICE VISIT (OUTPATIENT)
Dept: FAMILY MEDICINE CLINIC | Age: 45
End: 2020-02-20

## 2020-02-20 VITALS
DIASTOLIC BLOOD PRESSURE: 81 MMHG | WEIGHT: 183.2 LBS | TEMPERATURE: 98.2 F | RESPIRATION RATE: 16 BRPM | BODY MASS INDEX: 33.71 KG/M2 | HEIGHT: 62 IN | SYSTOLIC BLOOD PRESSURE: 119 MMHG | OXYGEN SATURATION: 97 % | HEART RATE: 72 BPM

## 2020-02-20 DIAGNOSIS — Z01.419 WELL WOMAN EXAM WITH ROUTINE GYNECOLOGICAL EXAM: Primary | ICD-10-CM

## 2020-02-20 DIAGNOSIS — Z13.220 LIPID SCREENING: ICD-10-CM

## 2020-02-20 DIAGNOSIS — F32.1 MODERATE MAJOR DEPRESSION (HCC): ICD-10-CM

## 2020-02-20 DIAGNOSIS — Z13.1 SCREENING FOR DIABETES MELLITUS: ICD-10-CM

## 2020-02-20 DIAGNOSIS — F43.21 GRIEVING: ICD-10-CM

## 2020-02-20 RX ORDER — MELATONIN
DAILY
COMMUNITY

## 2020-02-20 NOTE — PATIENT INSTRUCTIONS
Grief (Actual/Anticipated): Care Instructions Your Care Instructions Grief is your emotional reaction to a major loss. The words \"sorrow\" and \"heartache\" often are used to describe feelings of grief. You feel grief when you lose a beloved person, pet, place, or thing. It is also natural to feel grief when you lose a valued way of life, such as a job, marriage, or good health. You may begin to grieve before a loss occurs. You may grieve for a loved one who is sick and dying. Children and adults often feel the pain of loss before a big move or divorce. This type of grief helps you get ready for a loss. Grief is different for each person. There is no \"normal\" or \"expected\" period of time for grieving. Some people adjust to their loss within a couple of months. Others may take 2 years or longer, especially if their lives were changed a lot or if the loss was sudden and shocking. Grieving can cause problems such as headaches, loss of appetite, and trouble with thinking or sleeping. You may withdraw from friends and family and behave in ways that are unusual for you. Grief may cause you to question your beliefs and views about life. Grief is natural and does not require medical treatment. But if you have trouble sleeping, it may help to take sleeping pills for a short time. It may help to talk with people who have been through or are going through similar losses. You may also want to talk to a counselor about your feelings. Talking about your loss, sharing your cares and concerns, and getting support from others are important parts of healthy grieving. Follow-up care is a key part of your treatment and safety. Be sure to make and go to all appointments, and call your doctor if you are having problems. It's also a good idea to know your test results and keep a list of the medicines you take. How can you care for yourself at home? · Get enough sleep.  Your mind helps make sense of your life while you sleep. Missing sleep can lead to illness and make it harder for you to deal with your grief. · Eat healthy foods. Try to avoid eating only foods that give you comfort. Ask someone to join you for a meal if you do not like eating alone. Consider taking a multivitamin every day. · Get some exercise every day. Even a walk can help you deal with your grief. Other exercises, such as yoga, can also help you manage stress. · Comfort yourself. Take time to look at photos or use special items that make you feel better. · Stay involved in your life. Do not withdraw from the activities you enjoy. People you know at work, [employer], clubs, or other groups can help you get through your period of grief. · Think about joining a support group to help you deal with your grief. There are many support groups to help people recover from grief. When should you call for help? Call 911 anytime you think you may need emergency care. For example, call if: 
  · You feel you cannot stop from hurting yourself or someone else.  
 Watch closely for changes in your health, and be sure to contact your doctor if: 
  · You think you may be depressed.  
  · You do not get better as expected. Where can you learn more? Go to http://cesia-spenser.info/. Enter H249 in the search box to learn more about \"Grief (Actual/Anticipated): Care Instructions. \" Current as of: April 1, 2019 Content Version: 12.2 © 7220-2476 Healthwise, Incorporated. Care instructions adapted under license by [employer] (which disclaims liability or warranty for this information). If you have questions about a medical condition or this instruction, always ask your healthcare professional. [address] any warranty or liability for your use of this information.

## 2020-02-20 NOTE — PROGRESS NOTES
Haritha Winston is a 39 y.o.  female and presents with    Chief Complaint   Patient presents with    Well Woman       Subjective: Well Adult Physical   Patient here for a well woman physical exam. She had a hysterectomy and PAP is not indicated. She would like a pelvic exam today. The patient reports problems: see below  Do you take any herbs or supplements that were not prescribed by a doctor? yes Are you taking calcium supplements? no Are you taking aspirin daily? no    She reports anxiety and depression. Mom passed away suddenly 6 years ago and that has affected her. Her dad is sick and on hospice. 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 Additional Concerns: No         Patient Active Problem List   Diagnosis Code    Anemia D64.9    S/P laparoscopic assisted vaginal hysterectomy (LAVH) Z90.710     Current Outpatient Medications   Medication Sig Dispense Refill    cholecalciferol (VITAMIN D3) (1000 Units /25 mcg) tablet Take  by mouth daily.  multivitamin with iron tablet Take 1 Tab by mouth daily. No Known Allergies  Past Medical History:   Diagnosis Date    Anemia 10/13/2014    Fibroids 2016    H/O excision of dermoid cyst 2016     Past Surgical History:   Procedure Laterality Date    HX  SECTION  2001    HX CYST REMOVAL      right ovary    HX HYSTERECTOMY  2016    due to heavy menses and uterine fibroids.      HX OOPHORECTOMY Right     HX TUBAL LIGATION  2001     Family History   Problem Relation Age of Onset    Diabetes Mother     Heart Disease Mother     Heart Attack Mother     Heart Disease Father     Hypertension Father     Cancer Father         renal cancer    Diabetes Maternal Grandmother      Social History     Tobacco Use    Smoking status: Never Smoker    Smokeless tobacco: Never Used   Substance Use Topics    Alcohol use: Yes     Frequency: Monthly or less     Drinks per session: 1 or 2     Binge frequency: Never     Comment: occ once a month. ROS   History obtained from the patient  General ROS: negative for - chills or fever  Psychological ROS: positive for - anxiety and depression  Ophthalmic ROS: negative for - blurry vision or double vision  ENT ROS: negative for - headaches or hearing change  Breast ROS: negative for breast lumps  Respiratory ROS: no cough, shortness of breath, or wheezing  Cardiovascular ROS: no chest pain or dyspnea on exertion  Gastrointestinal ROS: no abdominal pain, change in bowel habits, or black or bloody stools  Genito-Urinary ROS: no dysuria, trouble voiding, or hematuria  Musculoskeletal ROS: negative for - joint pain, joint stiffness or joint swelling  Neurological ROS: no TIA or stroke symptoms  Dermatological ROS: negative for - rash    All other systems reviewed and are negative.       Objective:  Vitals:    02/20/20 0832   BP: 119/81   Pulse: 72   Resp: 16   Temp: 98.2 °F (36.8 °C)   TempSrc: Oral   SpO2: 97%   Weight: 183 lb 3.2 oz (83.1 kg)   Height: 5' 2\" (1.575 m)   PainSc:   0 - No pain   LMP: 01/06/2017     Hearing Screening (02/20/2020)   Edited by: Pao Horton LPN    [id number] [id number] [place].Brittanie Yang.[place].[facility].[place] 8000hz   Right ear   Pass Pass Pass  Pass     Left ear   Pass Pass Pass  Pass       Vision Screening (02/20/2020)   Edited by: Pao Horton LPN    Right eye Left eye Both eyes   Without correction 20 40 20 40 20 30         PE  General appearance - alert, well appearing, and in no distress and overweight  Mental status - normal mood, behavior, speech, dress, motor activity, and thought processes  Eyes - pupils equal and reactive, extraocular eye movements intact  Ears - bilateral TM's and external ear canals normal  Mouth - mucous membranes moist, pharynx normal without lesions  Neck - supple, no significant adenopathy  Chest - clear to auscultation, no wheezes, rales or rhonchi, symmetric air entry  Heart - normal rate and regular rhythm  Abdomen - soft, nontender, nondistended, no masses or organomegaly  Breasts - breasts appear normal, no suspicious masses, no skin or nipple changes or axillary nodes  Pelvic - normal external genitalia, vulva, vagina,  and adnexa, exam chaperoned by Justine Fields. LPN  Neurological - alert, oriented, normal speech, no focal findings or movement disorder noted  Musculoskeletal - no joint tenderness, deformity or swelling  Extremities - peripheral pulses normal, no pedal edema, no clubbing or cyanosis  Skin - normal coloration and turgor, no rashes, no suspicious skin lesions noted    LABS   Lab Results   Component Value Date/Time    WBC 7.1 01/03/2020 12:24 AM    HGB 13.9 01/03/2020 12:24 AM    HCT 41.0 01/03/2020 12:24 AM    PLATELET 494 84/50/4594 12:24 AM    MCV 91.9 01/03/2020 12:24 AM     Lab Results   Component Value Date/Time    ALT (SGPT) 27 01/03/2020 12:24 AM    AST (SGOT) 18 01/03/2020 12:24 AM    Alk. phosphatase 92 01/03/2020 12:24 AM    Bilirubin, direct <0.1 01/03/2020 12:24 AM    Bilirubin, total 0.2 01/03/2020 12:24 AM    Albumin 3.7 01/03/2020 12:24 AM    Protein, total 7.9 01/03/2020 12:24 AM    PLATELET 518 20/99/8648 12:24 AM    Hepatitis B surface Ag . 15 07/17/2012 09:24 AM     Lab Results   Component Value Date/Time    TSH 0.53 01/09/2020 09:45 AM    T4, Free 1.3 01/09/2020 09:45 AM      Lab Results   Component Value Date/Time    Sodium 138 01/03/2020 12:24 AM    Potassium 3.6 01/03/2020 12:24 AM    Chloride 105 01/03/2020 12:24 AM    CO2 28 01/03/2020 12:24 AM    Anion gap 5 01/03/2020 12:24 AM    Glucose 76 01/03/2020 12:24 AM    BUN 13 01/03/2020 12:24 AM    Creatinine 0.99 01/03/2020 12:24 AM    BUN/Creatinine ratio 13 01/03/2020 12:24 AM    GFR est AA >60 01/03/2020 12:24 AM    GFR est non-AA >60 01/03/2020 12:24 AM    Calcium 9.3 01/03/2020 12:24 AM    Bilirubin, total 0.2 01/03/2020 12:24 AM    ALT (SGPT) 27 01/03/2020 12:24 AM    AST (SGOT) 18 01/03/2020 12:24 AM Alk. phosphatase 92 01/03/2020 12:24 AM    Protein, total 7.9 01/03/2020 12:24 AM    Albumin 3.7 01/03/2020 12:24 AM    Globulin 4.2 (H) 01/03/2020 12:24 AM    A-G Ratio 0.9 01/03/2020 12:24 AM      Lab Results   Component Value Date/Time    VITAMIN D, 25-HYDROXY 23.8 (L) 01/09/2020 09:45 AM       Lab Results   Component Value Date/Time    Vitamin B12 1,009 (H) 01/09/2020 09:45 AM    Folate >20.00 01/09/2020 09:45 AM           Assessment/Plan:    1. Well woman exam with routine gynecological exam         2. Moderate major depression ([facility] [address]       -Patient reports 3 counseling services available at her place of employment. Recommended continued counseling. Patient given information on counseling services within the area. Told to return in 6 months for follow-up of mood. 3. Grieving    4. Lipid screening  -     LIPID PANEL; Future    5. Screening for diabetes mellitus  -     HEMOGLOBIN A1C WITH EAG; Future          Lab review: orders written for new lab studies as appropriate; see orders        Health Maintenance:   Mammogram patient to schedule at her convenience    I have discussed the diagnosis with the patient and the intended plan as seen in the above orders. The patient has received an after-visit summary and questions were answered concerning future plans. I have discussed medication side effects and warnings with the patient as well. I have reviewed the plan of care with the patient, accepted their input and they are in agreement with the treatment goals. Follow-up and Dispositions    · Return in about 6 weeks (around 4/2/2020) for Mood. More than 1/2 of this 25 minute visit was spent in counseling and coordination of care, as described above.     Cindy López, DNP, FNP-C

## 2020-02-20 NOTE — PROGRESS NOTES
April Lambert Buerger is a 39 y.o. female  Chief Complaint   Patient presents with    Well Woman     1. Have you been to the ER, urgent care clinic since your last visit? Hospitalized since your last visit? No    2. Have you seen or consulted any other health care providers outside of the [address] [place] [address] since your last visit? Include any pap smears or colon screening.  No

## 2020-02-21 LAB
AVG GLU, 10930: 111 MG/DL (ref 91–123)
CHOLEST SERPL-MCNC: 174 MG/DL (ref 110–200)
HBA1C MFR BLD HPLC: 5.5 % (ref 4.8–5.6)
HDLC SERPL-MCNC: 3.2 MG/DL (ref 0–5)
HDLC SERPL-MCNC: 55 MG/DL
LDL/HDL RATIO,LDHD: 2
LDLC SERPL CALC-MCNC: 108 MG/DL (ref 50–99)
NON-HDL CHOLESTEROL, 011976: 119 MG/DL
TRIGL SERPL-MCNC: 58 MG/DL (ref 40–149)
VLDLC SERPL CALC-MCNC: 12 MG/DL (ref 8–30)

## 2020-03-25 NOTE — ROUTINE PROCESS
Attempted to get report from PACU. Will call back. TRANSFER - IN REPORT:    Verbal report received from Camron Arroyo RN (name) on April C Harley De Jesus  being received from PACU (unit) for routine post - op      Report consisted of patients Situation, Background, Assessment and   Recommendations(SBAR). Information from the following report(s) SBAR, Kardex, OR Summary, Intake/Output, MAR and Recent Results was reviewed with the receiving nurse. Opportunity for questions and clarification was provided. Assessment completed upon patients arrival to unit and care assumed. craft

## 2020-07-15 ENCOUNTER — HOSPITAL ENCOUNTER (EMERGENCY)
Age: 45
Discharge: HOME | End: 2020-07-15
Attending: EMERGENCY MEDICINE
Payer: COMMERCIAL

## 2020-07-15 VITALS
TEMPERATURE: 98.3 F | WEIGHT: 180 LBS | BODY MASS INDEX: 33.13 KG/M2 | SYSTOLIC BLOOD PRESSURE: 147 MMHG | HEIGHT: 62 IN | OXYGEN SATURATION: 100 % | DIASTOLIC BLOOD PRESSURE: 88 MMHG | HEART RATE: 59 BPM | RESPIRATION RATE: 16 BRPM
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
## 2020-07-15 PROCEDURE — 99282 EMERGENCY DEPT VISIT SF MDM: CPT

## 2020-07-15 RX ORDER — CAMPHOR
SPIRIT TOPICAL
Qty: 177 ML | Refills: 0 | Status: SHIPPED | OUTPATIENT
Start: 2020-07-15 | End: 2022-02-16 | Stop reason: ALTCHOICE

## 2020-07-15 RX ORDER — CAMPHOR 0.45 %
GEL (GRAM) TOPICAL
Qty: 37.5 G | Refills: 0 | Status: SHIPPED | OUTPATIENT
Start: 2020-07-15 | End: 2022-02-16 | Stop reason: ALTCHOICE

## 2020-07-16 NOTE — ED PROVIDER NOTES
Kanwal Pino is a 39 y.o. female with complaints of insect bites to her left arm that she occurred yesterday and has gotten slightly worse today. They are slightly itchy. There is no streaking. She has no fever, chills, sweats. She is not immunocompromised. She has not placed anything on them. The history is provided by the patient. Past Medical History:   Diagnosis Date    Anemia 10/13/2014    Fibroids 2016    H/O excision of dermoid cyst 2016       Past Surgical History:   Procedure Laterality Date    HX  SECTION  ,     HX CYST REMOVAL      right ovary    HX HYSTERECTOMY      due to heavy menses and uterine fibroids.  HX OOPHORECTOMY Right 2013    HX TUBAL LIGATION  2001         Family History:   Problem Relation Age of Onset    Diabetes Mother     Heart Disease Mother     Heart Attack Mother     Heart Disease Father     Hypertension Father     Cancer Father         renal cancer    Diabetes Maternal Grandmother        Social History     Socioeconomic History    Marital status: SINGLE     Spouse name: Not on file    Number of children: Not on file    Years of education: Not on file    Highest education level: Not on file   Occupational History    Not on file   Social Needs    Financial resource strain: Not on file    Food insecurity     Worry: Not on file     Inability: Not on file    Transportation needs     Medical: Not on file     Non-medical: Not on file   Tobacco Use    Smoking status: Never Smoker    Smokeless tobacco: Never Used   Substance and Sexual Activity    Alcohol use: Yes     Frequency: Monthly or less     Drinks per session: 1 or 2     Binge frequency: Never     Comment: occ once a month.      Drug use: No    Sexual activity: Yes     Partners: Male     Birth control/protection: None, Surgical   Lifestyle    Physical activity     Days per week: Not on file     Minutes per session: Not on file    Stress: Not on file   Relationships    Social connections     Talks on phone: Not on file     Gets together: Not on file     Attends Sikhism service: Not on file     Active member of club or organization: Not on file     Attends meetings of clubs or organizations: Not on file     Relationship status: Not on file    Intimate partner violence     Fear of current or ex partner: Not on file     Emotionally abused: Not on file     Physically abused: Not on file     Forced sexual activity: Not on file   Other Topics Concern    Not on file   Social History Narrative    Not on file         ALLERGIES: Patient has no known allergies. Review of Systems   Constitutional: Negative for fever. Respiratory: Negative for shortness of breath. Cardiovascular: Negative for chest pain. Musculoskeletal: Negative for gait problem. Skin: Positive for rash. Allergic/Immunologic: Negative for immunocompromised state. Neurological: Negative for numbness. Psychiatric/Behavioral: Negative for sleep disturbance. Vitals:    07/15/20 2305   BP: 147/88   Pulse: (!) 59   Resp: 16   Temp: 98.3 °F (36.8 °C)   SpO2: 100%   Weight: 81.6 kg (180 lb)   Height: 5' 2\" (1.575 m)            Physical Exam  Vitals signs and nursing note reviewed. Constitutional:       General: She is not in acute distress. Appearance: She is well-developed. She is not ill-appearing, toxic-appearing or diaphoretic. HENT:      Head: Normocephalic and atraumatic. Right Ear: External ear normal.      Left Ear: External ear normal.      Nose: Nose normal.      Mouth/Throat:      Pharynx: Uvula midline. Eyes:      General: No scleral icterus. Conjunctiva/sclera: Conjunctivae normal.   Neck:      Musculoskeletal: Neck supple. Cardiovascular:      Rate and Rhythm: Normal rate and regular rhythm. Heart sounds: Normal heart sounds. Pulmonary:      Effort: Pulmonary effort is normal.      Breath sounds: Normal breath sounds.    Musculoskeletal: Arms:    Skin:     General: Skin is warm and dry. Capillary Refill: Capillary refill takes less than 2 seconds. Findings: Rash present. Neurological:      Mental Status: She is alert and oriented to person, place, and time. Gait: Gait normal.   Psychiatric:         Behavior: Behavior normal.          MDM       Procedures    Vitals:  Patient Vitals for the past 12 hrs:   Temp Pulse Resp BP SpO2   07/15/20 2305 98.3 °F (36.8 °C) (!) 59 16 147/88 100 %         Medications ordered:   Medications - No data to display      Lab findings:  No results found for this or any previous visit (from the past 12 hour(s)). EKG interpretation by ED Physician:      X-Ray, CT or other radiology findings or impressions:  No orders to display       Progress notes, Consult notes or additional Procedure notes:   Most consistent with localized reaction second to insect bites. There is not consistent with abscess or cellulitis. Discussed with patient regarding need for topical treatment and to keep area clean    I have discussed with patient and/or family/sig other the results, interpretation of any imaging if performed, suspected diagnosis and treatment plan to include instructions regarding the diagnoses listed to which understanding was expressed with all questions answered      Reevaluation of patient:   stable    Disposition:  Diagnosis:   1. Insect bites and stings, initial encounter        Disposition: home    Follow-up Information     Follow up With Specialties Details Why [facility] EMERGENCY DEPT Emergency Medicine  If symptoms worsen [address] [facility] [place] [address].  [phone]            Patient's Medications   Start Taking    CALAMINE-ZINC OXIDE (CALAMINE) SOLUTION    aaa bid prn    DIPHENHYDRAMINE HCL (BENADRYL) 2 % TOPICAL GEL    Apply  to affected area every six (6) hours as needed for Allergies or Itching.    Continue Taking    CHOLECALCIFEROL (VITAMIN D3) (1000 UNITS /25 MCG) TABLET    Take  by mouth daily. These Medications have changed    No medications on file   Stop Taking    MULTIVITAMIN WITH IRON TABLET    Take 1 Tab by mouth daily.

## 2020-07-16 NOTE — DISCHARGE INSTRUCTIONS
Patient Education        Insect Stings and Bites: Care Instructions  Your Care Instructions  Stings and bites from bees, wasps, ants, and other insects often cause pain, swelling, redness, and itching. In some people, especially children, the redness and swelling may be worse. It may extend several inches beyond the affected area. But in most cases, stings and bites don't cause reactions all over the body. If you have had a reaction to an insect sting or bite, you are at risk for a reaction if you get stung or bitten again. Follow-up care is a key part of your treatment and safety. Be sure to make and go to all appointments, and call your doctor if you are having problems. It's also a good idea to know your test results and keep a list of the medicines you take. How can you care for yourself at home? · Do not scratch or rub the skin where the sting or bite occurred. · Put a cold pack or ice cube on the area. Put a thin cloth between the ice and your skin. For some people, a paste of baking soda mixed with a little water helps relieve pain and decrease the reaction. · Take an over-the-counter antihistamine, such as diphenhydramine (Benadryl) or loratadine (Claritin), to relieve swelling, redness, and itching. Calamine lotion or hydrocortisone cream may also help. Do not give antihistamines to your child unless you have checked with the doctor first.  · Be safe with medicines. If your doctor prescribed medicine for your allergy, take it exactly as prescribed. Call your doctor if you think you are having a problem with your medicine. You will get more details on the specific medicines your doctor prescribes. · Your doctor may prescribe a shot of epinephrine to carry with you in case you have a severe reaction. Learn how and when to give yourself the shot, and keep it with you at all times. Make sure it has not . · Go to the emergency room anytime you have a severe reaction.  Go even if you have given yourself epinephrine and are feeling better. Symptoms can come back. When should you call for help? [id number] anytime you think you may need emergency care. For example, call if:  · You have symptoms of a severe allergic reaction. These may include:  ? Sudden raised, red areas (hives) all over your body. ? Swelling of the throat, mouth, lips, or tongue. ? Trouble breathing. ? Passing out (losing consciousness). Or you may feel very lightheaded or suddenly feel weak, confused, or restless. Call your doctor now or seek immediate medical care if:  · You have symptoms of an allergic reaction not right at the sting or bite, such as:  ? A rash or small area of hives (raised, red areas on the skin). ? Itching. ? Swelling. ? Belly pain, nausea, or vomiting. · You have a lot of swelling around the site (such as your entire arm or leg is swollen). · You have signs of infection, such as:  ? Increased pain, swelling, redness, or warmth around the sting. ? Red streaks leading from the area. ? Pus draining from the sting. ? A fever. Watch closely for changes in your health, and be sure to contact your doctor if:  · You do not get better as expected. Where can you learn more? Go to http://cesia-spenser.info/  Enter P390 in the search box to learn more about \"Insect Stings and Bites: Care Instructions. \"  Current as of: June 26, 2019               Content Version: 12.5  © 5394-4632 Healthwise, Incorporated. Care instructions adapted under license by [employer] (which disclaims liability or warranty for this information). If you have questions about a medical condition or this instruction, always ask your healthcare professional. [place] [address] any warranty or liability for your use of this information.

## 2020-09-14 ENCOUNTER — VIRTUAL VISIT (OUTPATIENT)
Dept: FAMILY MEDICINE CLINIC | Age: 45
End: 2020-09-14

## 2020-09-14 DIAGNOSIS — R42 VERTIGO: Primary | ICD-10-CM

## 2020-09-14 DIAGNOSIS — R03.0 ELEVATED BLOOD PRESSURE READING WITHOUT DIAGNOSIS OF HYPERTENSION: ICD-10-CM

## 2020-09-14 NOTE — PATIENT INSTRUCTIONS
Vertigo: Exercises Introduction Here are some examples of exercises for you to try. The exercises may be suggested for a condition or for rehabilitation. Start each exercise slowly. Ease off the exercises if you start to have pain. You will be told when to start these exercises and which ones will work best for you. How to do the exercises Exercise 1 1. Stand with a chair in front of you and a wall behind you. If you begin to fall, you may use them for support. 2. Stand with your feet together and your arms at your sides. 3. Move your head up and down 10 times. Exercise 2 1. Move your head side to side 10 times. Exercise 3 1. Move your head diagonally up and down 10 times. Exercise 4 1. Move your head diagonally up and down 10 times on the other side. Follow-up care is a key part of your treatment and safety. Be sure to make and go to all appointments, and call your doctor if you are having problems. It's also a good idea to know your test results and keep a list of the medicines you take. Where can you learn more? Go to http://www.gray.com/ Enter F349 in the search box to learn more about \"Vertigo: Exercises. \" Current as of: April 15, 2020               Content Version: 12.6 © 2006-2020 [employer]. Care instructions adapted under license by [employer] (which disclaims liability or warranty for this information). If you have questions about a medical condition or this instruction, always ask your healthcare professional. [place] [address] any warranty or liability for your use of this information. Epley Maneuver at Home for Vertigo: Exercises Introduction Vertigo is a spinning or whirling sensation when you move your head. Your doctor may have moved you in different positions to help your vertigo get better faster. This is called the Epley maneuver.  Your doctor also may have asked you to do these exercises at home. Do the exercises as often as your doctor recommends. If your vertigo is getting worse, your doctor may have you change the exercise or stop it. Step 1 Step 1 1. Sit on the edge of a bed or sofa. Step 2 1. Turn your head 45 degrees in the direction your doctor told you to. This should be toward the ear that causes the most vertigo for you. In this picture, the woman is turning toward her left ear. Step 3 1. Tilt yourself backward until you are lying on your back. Your head should still be at a 45-degree turn. Your head should be about midway between looking straight ahead and looking out to your side. Hold for 30 seconds. If you have vertigo, stay in this position until it stops. Step 4 1. Turn your head 90 degrees toward the ear that has the least vertigo. In this picture, the woman is turning to the right because she has vertigo on her left side. The point of your chin should be raised and over your shoulder. Hold for 30 seconds. Step 5 1. Roll onto the side with the least vertigo. You should now be looking at the floor. Hold for 30 seconds. Follow-up care is a key part of your treatment and safety. Be sure to make and go to all appointments, and call your doctor if you are having problems. It's also a good idea to know your test results and keep a list of the medicines you take. Where can you learn more? Go to http://cesia-spenser.info/ Enter 470 [id number] in the search box to learn more about \"Epley Maneuver at Home for Vertigo: Exercises. \" Current as of: November 20, 2019               Content Version: 12.6 © 9125-3378 [employer]. Care instructions adapted under license by [employer] (which disclaims liability or warranty for this information).  If you have questions about a medical condition or this instruction, always ask your healthcare professional. [address] any warranty or liability for your use of this information.

## 2020-09-14 NOTE — PROGRESS NOTES
Kanwal Martínez was seen by synchronous (real-time) audio-video technology [employer] on 9/14/2020. Consent: Kanwal Martínez, who was seen by synchronous (real-time) audio-video technology, and/or her healthcare decision maker, is aware that this patient-initiated, Telehealth encounter on 9/14/2020 is a billable service, with coverage as determined by her insurance carrier. She is aware that she may receive a bill and has provided verbal consent to proceed: yes  712  Subjective:     HPI:  Kanwal Martínez is a 39 y.o. female who was seen for the following:         Has had borderline high BP in the past.   149/96 last week and had a headache and nausea. Today 138/70. Vertigo:   Reports recent recurrence of dizziness, like \"things spinning around me. \" She has had it before and was given exercises. Meclizine just causes her to be sleepy. She has been to ENT for this. Review of Systems   Constitutional: Negative for appetite change, diaphoresis, fatigue, fever and unexpected weight change. HENT: Negative for congestion and sore throat. Eyes: Negative for visual disturbance. Respiratory: Negative for cough, chest tightness and shortness of breath. Cardiovascular: Negative for chest pain, palpitations and leg swelling. Gastrointestinal: Positive for nausea. Negative for abdominal distention, abdominal pain, blood in stool, constipation, diarrhea, rectal pain and vomiting. Endocrine: Negative for polydipsia, polyphagia and polyuria. Genitourinary: Negative for decreased urine volume, dysuria and frequency. Musculoskeletal: Negative for back pain, joint swelling and myalgias. Skin: Negative for rash and wound. Neurological: Positive for dizziness and headaches. Negative for weakness, light-headedness and numbness. Psychiatric/Behavioral: Negative for dysphoric mood and sleep disturbance. The patient is not nervous/anxious.          Past Medical History, Past Surgery History, Allergies, Social History, and Family History were reviewed and updated. Patient Active Problem List   Diagnosis Code    Anemia D64.9    S/P laparoscopic assisted vaginal hysterectomy (LAVH) Z90.710     Past Medical History:   Diagnosis Date    Anemia 10/13/2014    Fibroids 2016    H/O excision of dermoid cyst 2016     Patient Care Team:  Tomy Schroeder, NP as PCP - General (Family Medicine)  Tomy Schroeder NP as PCP - [facility]    Past Surgical History:   Procedure Laterality Date    HX  SECTION  ,     HX CYST REMOVAL      right ovary    HX HYSTERECTOMY      due to heavy menses and uterine fibroids.  HX OOPHORECTOMY Right 2013    HX TUBAL LIGATION  2001     Family History   Problem Relation Age of Onset    Diabetes Mother     Heart Disease Mother     Heart Attack Mother     Heart Disease Father     Hypertension Father     Cancer Father         renal cancer    Diabetes Maternal Grandmother      Social History     Tobacco Use    Smoking status: Never Smoker    Smokeless tobacco: Never Used   Substance Use Topics    Alcohol use: Yes     Frequency: Monthly or less     Drinks per session: 1 or 2     Binge frequency: Never     Comment: occ once a month.  Drug use: No     No Known Allergies  Current Outpatient Medications on File Prior to Visit   Medication Sig Dispense Refill    calamine-zinc oxide (CALAMINE) solution aaa bid prn 177 mL 0    diphenhydrAMINE hcl (BenadryL) 2 % topical gel Apply  to affected area every six (6) hours as needed for Allergies or Itching. 37.5 g 0    cholecalciferol (VITAMIN D3) (1000 Units /25 mcg) tablet Take  by mouth daily. No current facility-administered medications on file prior to visit.       Health Maintenance Due   Topic Date Due    Flu Vaccine (1) 2020         Objective     PHYSICAL EXAMINATION:    Vital Signs: (As obtained by patient/caregiver at home)   Patient-Reported Vitals 9/14/2020   Patient-Reported Weight 178   Patient-Reported Height 54   Patient-Reported Pulse 76   Patient-Reported Temperature 97.6   Patient-Reported Systolic  248   Patient-Reported Diastolic 75          General: Alert, cooperative, no distress   Mental  status: Normal mood, behavior, speech, dress, motor activity, and thought processes, able to follow commands   HENT: Normocephalic, atraumatic   Neck: No visualized mass   Resp: No respiratory distress   Neuro: No gross deficits   Skin: No discoloration or lesions of concern on visible areas   Psychiatric: Normal affect, consistent with stated mood, no evidence of hallucinations     Additional exam findings: none      LABS     TESTS      Assessment and Plan     1. Vertigo  She declined rx for meclizine. I will send instructions for exercises she can do at home. The instructions will be available on [employer]. If symptoms continue could refer her to PT. 2. Elevated blood pressure reading without diagnosis of hypertension  Continue to monitor. Return if running 140/90 or higher. Follow-up and Dispositions    · Return if symptoms worsen or fail to improve. 712  We discussed the expected course, resolution and complications of the diagnosis(es) in detail. Medication risks, benefits, costs, interactions, and alternatives were discussed as indicated. I advised her to contact the office if her condition worsens, changes or fails to improve as anticipated. She expressed understanding with the diagnosis(es) and plan. Kanwal May is a 39 y.o. female who was evaluated by a video visit encounter for concerns as above. Patient identification was verified prior to start of the visit. A caregiver was present when appropriate. Due to this being a TeleHealth encounter ([facility] emergency), evaluation of the following organ systems was limited: Vitals/Constitutional/EENT/Resp/CV/GI//MS/Neuro/Skin/Heme-Lymph-Imm.   Pursuant to the emergency declaration under the [facility], [address] [place] [address] authority and the [employer] Act, this Virtual  Visit was conducted, with patient's (and/or legal guardian's) consent, to reduce the patient's risk of exposure to COVID-19 and provide necessary medical care. Services were provided through a video synchronous discussion virtually to substitute for in-person clinic visit. Patient was located at home and provider was located at the office.       Signed electronically by Forde Favre, DNP, FNP-BC

## 2020-10-10 ENCOUNTER — HOSPITAL ENCOUNTER (EMERGENCY)
Age: 45
Discharge: HOME | End: 2020-10-10
Attending: EMERGENCY MEDICINE
Payer: COMMERCIAL

## 2020-10-10 ENCOUNTER — HOSPITAL ENCOUNTER (EMERGENCY)
Dept: GENERAL RADIOLOGY | Age: 45
Discharge: HOME | End: 2020-10-10
Attending: EMERGENCY MEDICINE
Payer: COMMERCIAL

## 2020-10-10 VITALS
BODY MASS INDEX: 31.28 KG/M2 | HEIGHT: 62 IN | DIASTOLIC BLOOD PRESSURE: 84 MMHG | TEMPERATURE: 98 F | OXYGEN SATURATION: 99 % | RESPIRATION RATE: 16 BRPM | WEIGHT: 170 LBS | SYSTOLIC BLOOD PRESSURE: 138 MMHG | HEART RATE: 76 BPM

## 2020-10-10 DIAGNOSIS — S20.212A CONTUSION OF RIB ON LEFT SIDE, INITIAL ENCOUNTER: Primary | ICD-10-CM

## 2020-10-10 PROCEDURE — 74011250637 HC RX REV CODE- 250/637: Performed by: EMERGENCY MEDICINE

## 2020-10-10 PROCEDURE — 71101 X-RAY EXAM UNILAT RIBS/CHEST: CPT

## 2020-10-10 PROCEDURE — 99283 EMERGENCY DEPT VISIT LOW MDM: CPT

## 2020-10-10 RX ORDER — HYDROCODONE BITARTRATE AND ACETAMINOPHEN 5; 325 MG/1; MG/1
1 TABLET ORAL ONCE
Status: COMPLETED | OUTPATIENT
Start: 2020-10-10 | End: 2020-10-10

## 2020-10-10 RX ORDER — HYDROCODONE BITARTRATE AND ACETAMINOPHEN 5; 325 MG/1; MG/1
1 TABLET ORAL
Qty: 6 TAB | Refills: 0 | Status: SHIPPED | OUTPATIENT
Start: 2020-10-10 | End: 2020-10-13

## 2020-10-10 RX ADMIN — HYDROCODONE BITARTRATE AND ACETAMINOPHEN 1 TABLET: 5; 325 TABLET ORAL at 21:01

## 2020-10-10 NOTE — LETTER
NOTIFICATION RETURN TO WORK / SCHOOL 
 
10/10/2020 9:42 PM 
 
Ms. Cortez Pain [address] [facility] [id number] To Whom It May Concern: 
 
April FRANSISCA Abelardo Triana is currently under the care of [facility] EMERGENCY DEPT. She will return to work/school on: 10/12/2020 If there are questions or concerns please have the patient contact our office.  
 
 
 
Sincerely, 
 
 
Jennifer Jorgensen MD
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
## 2020-10-11 NOTE — DISCHARGE INSTRUCTIONS
Thank you so much for visiting us today. Please make sure to call your doctor today to be rechecked in 1-3 days. Bring your results from here with you when you do. Return immediately if any fevers, headaches, chest pain, difficulty breathing, abdominal pain, worsening or changing symptoms, or any other concerns. Patient Education        Bruised Rib: Care Instructions  Overview     You can get a bruised rib if you fall or get hit, such as in an accident or while playing sports. The medical term for a bruise is \"contusion. \" Small blood vessels get torn and leak blood under the skin. Most people think of a bruise as a black-and-blue area. But bones and muscles can also get bruised. An injury may damage the rib but not cause a bruise that you can see. Sometimes it can be hard to tell if a rib is bruised or broken. The symptoms may be the same. And a broken bone can't always be seen on an X-ray. But the treatment for a bruised rib is often the same as treatment for a broken one. An injury to the ribs can cause pain. The pain may be worse when you breathe deeply, cough, or sneeze. In most cases, a bruised rib will heal on its own. You can take pain medicine while the rib mends. Pain relief allows you to take deep breaths. Follow-up care is a key part of your treatment and safety. Be sure to make and go to all appointments, and call your doctor if you are having problems. It's also a good idea to know your test results and keep a list of the medicines you take. How can you care for yourself at home? · Rest and protect the injured or sore area. Stop, change, or take a break from any activity that causes pain. · Put ice or a cold pack on the area for 10 to 20 minutes at a time. Put a thin cloth between the ice and your skin. · After 2 or 3 days, if your swelling is gone, put a heating pad set on low or a warm cloth on your chest. Some doctors suggest that you go back and forth between hot and cold.  Put a thin cloth between the heating pad and your skin. · Ask your doctor if you can take an over-the-counter pain medicine, such as acetaminophen (Tylenol), ibuprofen (Advil, Motrin), or naproxen (Aleve). Be safe with medicines. Read and follow all instructions on the label. · As your pain gets better, slowly return to your normal activities. Be patient. Rib bruises can take weeks or months to heal. If the pain gets worse, it may be a sign that you need to rest a while longer. When should you call for help? Call 911 anytime you think you may need emergency care. For example, call if:    · You have severe trouble breathing. Call your doctor now or seek immediate medical care if:    · You have trouble breathing.     · You have a fever.     · You have a new or worse cough.     · You have new or worse pain. Watch closely for changes in your health, and be sure to contact your doctor if:    · You do not get better as expected. Where can you learn more? Go to http://www.gray.com/  Enter R125 in the search box to learn more about \"Bruised Rib: Care Instructions. \"  Current as of: June 26, 2019               Content Version: 12.6  © 7394-3856 [employer]. Care instructions adapted under license by [employer] (which disclaims liability or warranty for this information). If you have questions about a medical condition or this instruction, always ask your healthcare professional. [place] [address] any warranty or liability for your use of this information.
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
## 2020-10-26 ENCOUNTER — VIRTUAL VISIT (OUTPATIENT)
Dept: FAMILY MEDICINE CLINIC | Age: 45
End: 2020-10-26
Payer: COMMERCIAL

## 2020-10-26 DIAGNOSIS — F32.1 MODERATE MAJOR DEPRESSION (HCC): Primary | ICD-10-CM

## 2020-10-26 DIAGNOSIS — F43.21 GRIEVING: ICD-10-CM

## 2020-10-26 DIAGNOSIS — N95.1 MENOPAUSAL VASOMOTOR SYNDROME: ICD-10-CM

## 2020-10-26 PROCEDURE — 99213 OFFICE O/P EST LOW 20 MIN: CPT | Performed by: NURSE PRACTITIONER
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
## 2021-03-30 ENCOUNTER — OFFICE VISIT (OUTPATIENT)
Dept: FAMILY MEDICINE CLINIC | Age: 46
End: 2021-03-30
Payer: COMMERCIAL

## 2021-03-30 VITALS
DIASTOLIC BLOOD PRESSURE: 80 MMHG | HEIGHT: 62 IN | BODY MASS INDEX: 33.68 KG/M2 | OXYGEN SATURATION: 98 % | HEART RATE: 74 BPM | TEMPERATURE: 97.1 F | SYSTOLIC BLOOD PRESSURE: 121 MMHG | WEIGHT: 183 LBS | RESPIRATION RATE: 18 BRPM

## 2021-03-30 DIAGNOSIS — M25.512 ACUTE PAIN OF LEFT SHOULDER: ICD-10-CM

## 2021-03-30 DIAGNOSIS — M25.512 ACUTE PAIN OF LEFT SHOULDER: Primary | ICD-10-CM

## 2021-03-30 PROCEDURE — 99213 OFFICE O/P EST LOW 20 MIN: CPT | Performed by: STUDENT IN AN ORGANIZED HEALTH CARE EDUCATION/TRAINING PROGRAM

## 2021-03-30 RX ORDER — ACETAMINOPHEN 500 MG
1000 TABLET ORAL
Qty: 100 TAB | Refills: 0 | Status: SHIPPED | OUTPATIENT
Start: 2021-03-30 | End: 2021-04-29

## 2021-03-30 NOTE — PROGRESS NOTES
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 no    Is the patient using any DME equipment during OV? no    Depression Screening:  3 most recent PHQ Screens 3/30/2021   Little interest or pleasure in doing things Not at all   Feeling down, depressed, irritable, or hopeless Not at all   Total Score PHQ 2 0   Trouble falling or staying asleep, or sleeping too much -   Feeling tired or having little energy -   Poor appetite, weight loss, or overeating -   Feeling bad about yourself - or that you are a failure or have let yourself or your family down -   Trouble concentrating on things such as school, work, reading, or watching TV -   Moving or speaking so slowly that other people could have noticed; or the opposite being so fidgety that others notice -   Thoughts of being better off dead, or hurting yourself in some way -   PHQ 9 Score -   How difficult have these problems made it for you to do your work, take care of your home and get along with others -       Learning Assessment:  Learning Assessment 10/3/2014   PRIMARY LEARNER Patient   PRIMARY LANGUAGE ENGLISH   LEARNER PREFERENCE PRIMARY LISTENING     DEMONSTRATION   ANSWERED BY patient   RELATIONSHIP SELF       Abuse Screening:  Abuse Screening Questionnaire 7/8/2019   Do you ever feel afraid of your partner? N   Are you in a relationship with someone who physically or mentally threatens you? N   Is it safe for you to go home? Y       Fall Risk  Fall Risk Assessment, last 12 mths 7/8/2019   Able to walk? Yes   Fall in past 12 months? No       Health Maintenance reviewed and discussed and ordered per Provider. Health Maintenance Due   Topic Date Due    COVID-19 Vaccine (1) Never done   . Coordination of Care:  1.  Have you been to the ER, urgent care clinic since your last visit? Hospitalized since your last visit? no    2. Have you seen or consulted any other health care providers outside of the [address] [place] [address] since your last visit? Include any pap smears or colon screening.  Dr. Jessie Wheeler, GYN       Last  Checked no  Last UDS Checked no  Last Pain contract signed: no

## 2021-03-30 NOTE — PATIENT INSTRUCTIONS
Rotator Cuff Injury: Care Instructions Your Care Instructions The rotator cuff is a group of tendons and muscles around the shoulder that keeps the upper arm bone in place. It keeps the shoulder joint stable and allows you to raise and rotate your arm. Damage to the rotator cuff can be caused by overuse, a fall, or a direct blow to the shoulder area, which can tear the tendons. Over time, everyday wear can damage the tendons and make injury more likely. Treatment can depend upon the amount of damage to the tendons. In a younger person, surgery may be the first choice. But if you are older than 25, you likely have some wear on your rotator cuff. Surgery may not be the most effective treatment. Your doctor may have you try physical therapy and exercise first. 
Follow-up care is a key part of your treatment and safety. Be sure to make and go to all appointments, and call your doctor if you are having problems. It's also a good idea to know your test results and keep a list of the medicines you take. How can you care for yourself at home? · Rest your shoulder as much as you can. If your doctor put your arm in a sling or shoulder immobilizer, wear it as directed. Do not take it off before your doctor tells you to. If it is too tight, loosen it. · Be safe with medicines. Read and follow all instructions on the label. ? If the doctor gave you a prescription medicine for pain, take it as prescribed. ? If you are not taking a prescription pain medicine, ask your doctor if you can take an over-the-counter medicine. · Put ice or a cold pack on your shoulder for 10 to 20 minutes at a time. Try to do this every 1 to 2 hours for the next 3 days (when you are awake). Put a thin cloth between the ice pack and your skin. · After 3 days, put a warm, wet towel on your shoulder. This is to relax the muscles and help blood flow.  
· While holding a warm, wet towel on your shoulder, lean forward so your arm hangs freely, and gently swing your arm back and forth like a pendulum. You also can do this standing under a warm shower. · Do not do anything that makes your pain worse. · Follow your doctor's advice about whether you need physical therapy. When should you call for help? Call your doctor now or seek immediate medical care if: 
  · You have severe pain.  
  · You cannot move your shoulder or arm.  
  · You have tingling or numbness in your arm or hand.  
  · Your arm or hand is cool or pale. Watch closely for changes in your health, and be sure to contact your doctor if: 
  · Your pain gets worse.  
  · You have new or worse swelling in your arm or hand.  
  · You do not get better as expected. Where can you learn more? Go to http://www.gray.com/ Enter [phone] in the search box to learn more about \"Rotator Cuff Injury: Care Instructions. \" Current as of: March 2, 2020               Content Version: 12.6 © 4295-6166 [employer]. Care instructions adapted under license by [employer] (which disclaims liability or warranty for this information). If you have questions about a medical condition or this instruction, always ask your healthcare professional. [place] [address] any warranty or liability for your use of this information.
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
## 2021-05-03 ENCOUNTER — VIRTUAL VISIT (OUTPATIENT)
Dept: FAMILY MEDICINE CLINIC | Age: 46
End: 2021-05-03
Payer: COMMERCIAL

## 2021-05-03 DIAGNOSIS — R10.31 RIGHT LOWER QUADRANT ABDOMINAL PAIN: Primary | ICD-10-CM

## 2021-05-03 DIAGNOSIS — F32.1 MODERATE MAJOR DEPRESSION (HCC): ICD-10-CM

## 2021-05-03 PROCEDURE — 99213 OFFICE O/P EST LOW 20 MIN: CPT | Performed by: STUDENT IN AN ORGANIZED HEALTH CARE EDUCATION/TRAINING PROGRAM

## 2021-05-03 NOTE — PROGRESS NOTES
Krystin Dick presents today for   Chief Complaint   Patient presents with    Abdominal Pain       Is someone accompanying this pt? no    Is the patient using any DME equipment during [address]? no    Depression Screening:  3 most recent PHQ Screens 3/30/2021   Little interest or pleasure in doing things Not at all   Feeling down, depressed, irritable, or hopeless Not at all   Total Score PHQ 2 0   Trouble falling or staying asleep, or sleeping too much -   Feeling tired or having little energy -   Poor appetite, weight loss, or overeating -   Feeling bad about yourself - or that you are a failure or have let yourself or your family down -   Trouble concentrating on things such as school, work, reading, or watching TV -   Moving or speaking so slowly that other people could have noticed; or the opposite being so fidgety that others notice -   Thoughts of being better off dead, or hurting yourself in some way -   PHQ 9 Score -   How difficult have these problems made it for you to do your work, take care of your home and get along with others -       Learning Assessment:  Learning Assessment 10/3/2014   PRIMARY LEARNER Patient   PRIMARY LANGUAGE ENGLISH   LEARNER PREFERENCE PRIMARY LISTENING     DEMONSTRATION   ANSWERED BY patient   RELATIONSHIP SELF       Abuse Screening:  Abuse Screening Questionnaire 7/8/2019   Do you ever feel afraid of your partner? N   Are you in a relationship with someone who physically or mentally threatens you? N   Is it safe for you to go home? Y       Fall Risk  Fall Risk Assessment, last 12 mths 7/8/2019   Able to walk? Yes   Fall in past 12 months? No       Health Maintenance reviewed and discussed and ordered per Provider. Health Maintenance Due   Topic Date Due    COVID-19 Vaccine (1) Never done   . Coordination of Care:  1. Have you been to the ER, urgent care clinic since your last visit? Hospitalized since your last visit? no    2.  Have you seen or consulted any other health care providers outside of the [address] [place] [address] since your last visit? Include any pap smears or colon screening.  no      Last  Checked na  Last UDS Checked na  Last Pain contract signed: jaqueline

## 2021-05-03 NOTE — PROGRESS NOTES
Loyd Sterling is a 55 y.o.  female and presents with    Chief Complaint   Patient presents with    Abdominal Pain       April C Esperanza Paul, who was evaluated through a synchronous (real-time) audio-video encounter, and/or her healthcare decision maker, is aware that it is a billable service, with coverage as determined by her insurance carrier. She provided verbal consent to proceed: Yes, and patient identification was verified. This visit was conducted pursuant to the emergency declaration under the [facility], [address] [place] [address] and the [employer] and [email or web address] General Act. A caregiver was present when appropriate. Ability to conduct physical exam was limited. The patient was located in a state where the provider was credentialed to provide care. --Janett Carmona MD on 5/3/2021 at 3:02 PM          Subjective:  Yesterday patient started experiencing right lower quadrant pain. States that this was intermittent pain. Felt in nature as squeezing, states that it was localized to the right lower quadrant, and did not radiate anywhere. Felt that when the pain was on it was almost debilitating. She does have a history of hysterectomy with R oophorectomy. Patient states that she has only felt slightly nauseous, afebrile, and denies dysuria. Patient denies having lift heavy objects. Did take some Motrin yesterday for pain, however she felt that due to pain was tossing and turning the whole night. After speaking with family members patient is concerned for appendicitis. Regarding her depression, patient states that in the past she had spoken to Dr. Hussein Porras about it. She is adamant that she will not not to do any medication at this time. However she would like to have someone to talk to.   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 Patient Active Problem List   Diagnosis Code    Anemia D64.9    S/P laparoscopic assisted vaginal hysterectomy (LAVH) Z90.710    Depression F32.9      Past Medical History:   Diagnosis Date    Anemia 10/13/2014    Depression     Fibroids 2016    H/O excision of dermoid cyst 2016      Past Surgical History:   Procedure Laterality Date    HX  SECTION  ,     HX CYST REMOVAL      right ovary    HX HYSTERECTOMY      due to heavy menses and uterine fibroids.  HX OOPHORECTOMY Right 2013    HX TUBAL LIGATION  2001      Family History   Problem Relation Age of Onset    Diabetes Mother     Heart Disease Mother     Heart Attack Mother     Heart Disease Father     Hypertension Father     Cancer Father         renal cancer    Diabetes Maternal Grandmother      Social History     Socioeconomic History    Marital status: SINGLE     Spouse name: Not on file    Number of children: Not on file    Years of education: Not on file    Highest education level: Not on file   Occupational History    Not on file   Social Needs    Financial resource strain: Not on file    Food insecurity     Worry: Not on file     Inability: Not on file    Transportation needs     Medical: Not on file     Non-medical: Not on file   Tobacco Use    Smoking status: Never Smoker    Smokeless tobacco: Never Used   Substance and Sexual Activity    Alcohol use: Yes     Frequency: Monthly or less     Drinks per session: 1 or 2     Binge frequency: Never     Comment: occ once a month.      Drug use: No    Sexual activity: Yes     Partners: Male     Birth control/protection: None, Surgical   Lifestyle    Physical activity     Days per week: Not on file     Minutes per session: Not on file    Stress: Not on file   Relationships    Social connections     Talks on phone: Not on file     Gets together: Not on file     Attends [employer] service: Not on file Active member of club or organization: Not on file     Attends meetings of clubs or organizations: Not on file     Relationship status: Not on file    Intimate partner violence     Fear of current or ex partner: Not on file     Emotionally abused: Not on file     Physically abused: Not on file     Forced sexual activity: Not on file   Other Topics Concern    Not on file   Social History Narrative    Not on file        Current Outpatient Medications   Medication Sig Dispense Refill    calamine-zinc oxide (CALAMINE) solution aaa bid prn 177 mL 0    diphenhydrAMINE hcl (BenadryL) 2 % topical gel Apply  to affected area every six (6) hours as needed for Allergies or Itching. 37.5 g 0    cholecalciferol (VITAMIN D3) (1000 Units /25 mcg) tablet Take  by mouth daily. ROS   Review of Systems   Constitutional: Negative for chills, fever and malaise/fatigue. HENT: Negative for congestion, ear discharge and ear pain. Eyes: Negative for blurred vision, pain and discharge. Respiratory: Negative for cough and shortness of breath. Cardiovascular: Negative for chest pain and palpitations. Gastrointestinal: Positive for abdominal pain. Negative for nausea and vomiting. Genitourinary: Negative for dysuria, frequency and urgency. Skin: Negative for itching and rash. Neurological: Negative for dizziness, seizures, loss of consciousness and headaches. Psychiatric/Behavioral: Negative for substance abuse. Objective: There were no vitals filed for this visit. Physical Exam  Constitutional:       Appearance: Normal appearance. She is obese. Eyes:      General: No scleral icterus. Right eye: No discharge. Left eye: No discharge. Neck:      Musculoskeletal: Normal range of motion and neck supple. Pulmonary:      Effort: Pulmonary effort is normal. No respiratory distress. Neurological:      Mental Status: She is alert and oriented to person, place, and time. Cranial Nerves: No cranial nerve deficit. Psychiatric:         Mood and Affect: Mood normal.         Behavior: Behavior normal.         Thought Content: Thought content normal.         Judgment: Judgment normal.           LABS     TESTS      Assessment/Plan:    1. Right lower quadrant abdominal pain  Unlikely to be appendicitis or kd stone d/t pt not being toxic. Likely MSK in nature. Advised pt that if pain worsens to attend ED. 2. Moderate major depression ([facility] [address]  Will send pt list of [facility]. She will like to not start medication at this time, however if pt would so desire in the future she will let me know. Lab review: no lab studies available for review at time of visit      I have discussed the diagnosis with the patient and the intended plan as seen in the above orders. Questions were answered concerning future plans. I have discussed medication side effects and warnings with the patient as well. I have reviewed the plan of care with the patient, accepted their input and they are in agreement with the treatment goals.          Jules Brito MD

## 2021-07-23 ENCOUNTER — PATIENT OUTREACH (OUTPATIENT)
Dept: CASE MANAGEMENT | Age: 46
End: 2021-07-23

## 2021-07-23 NOTE — PROGRESS NOTES
Complex Case Management      Date/Time:  2021 2:41 PM    Method of communication with patient:phone    Ambulator Care Manager (ACM) contacted the patient by telephone to perform Ambulatory Care Coordination. Verified name and  (PHI) with patient as identifiers. Provided introduction to self, and explanation of the Ambulatory Care Manager's role. Reviewed most recent clinic visit w/ patient who verbalized understanding. Patient given an opportunity to ask questions. Top Challenges reviewed with the patient   1. States that she continues to have occasional RLQ abdominal pain  2. States she completed COVID vaccination at [facility] and will take her card to next PCP appointment to be documented in EMR  3. States she wants to learn to \"eat better and lose weight\" to prevent any health issues     The patient agrees to contact the PCP office or the [address] [place] [address] for questions related to their healthcare. Provided contact information for future reference. Disease Specific:   N/A    Home Health Active: No    DME Active: No    Barriers to care? None noted at present time    Advance Care Planning:   Does patient have an Advance Directive:  not on file; education provided     Medication(s):   Medication reconciliation was performed with patient, who verbalizes understanding of administration of home medications. There were no barriers to obtaining medications identified at this time. Referral to Pharm D needed: no     Current Outpatient Medications   Medication Sig    cholecalciferol (VITAMIN D3) (1000 Units /25 mcg) tablet Take  by mouth daily.  calamine-zinc oxide (CALAMINE) solution aaa bid prn (Patient not taking: Reported on 2021)    diphenhydrAMINE hcl (BenadryL) 2 % topical gel Apply  to affected area every six (6) hours as needed for Allergies or Itching. (Patient not taking: Reported on 2021)     No current facility-administered medications for this visit.        [facility] follow up appointment(s): No future appointments.      Non-[facility] follow up appointment(s): NA    Goals Addressed                    This Visit's Progress      Independent self-management skills to prevent health issues (pt-stated)         Patient wishes to be on track to prevent future health problems by end of year    Follow healthy , speak to nutritionist  Increase number of days to exercise  Lose weight  Colorectal exam is due  Enter counseling for depression - has lost several family members over the last year

## 2021-07-28 ENCOUNTER — PATIENT OUTREACH (OUTPATIENT)
Dept: CASE MANAGEMENT | Age: 46
End: 2021-07-28

## 2021-07-28 NOTE — PROGRESS NOTES
Complex Case Management      Date/Time:  7/28/2021 4:27 PM     Attempted to reach patient by telephone. Left HIPPA compliant message requesting a return call. Will attempt to reach patient at a later time.

## 2021-08-04 ENCOUNTER — PATIENT OUTREACH (OUTPATIENT)
Dept: CASE MANAGEMENT | Age: 46
End: 2021-08-04

## 2021-08-04 NOTE — PROGRESS NOTES
Complex Case Management      Date/Time:  8/4/2021 2:53 PM     Attempted to reach patient by telephone. Left HIPPA compliant message requesting a return call. Will attempt to reach patient at a later time.

## 2021-08-13 ENCOUNTER — PATIENT OUTREACH (OUTPATIENT)
Dept: CASE MANAGEMENT | Age: 46
End: 2021-08-13

## 2021-08-13 NOTE — PROGRESS NOTES
Complex Case Management      Date/Time:  8/13/2021 10:51 AM     Attempted to reach patient by telephone. Left HIPPA compliant message requesting a return call. Will attempt to reach patient at a later time.

## 2021-08-18 ENCOUNTER — OFFICE VISIT (OUTPATIENT)
Dept: FAMILY MEDICINE CLINIC | Age: 46
End: 2021-08-18
Payer: COMMERCIAL

## 2021-08-18 VITALS
DIASTOLIC BLOOD PRESSURE: 86 MMHG | SYSTOLIC BLOOD PRESSURE: 148 MMHG | BODY MASS INDEX: 28.79 KG/M2 | RESPIRATION RATE: 20 BRPM | OXYGEN SATURATION: 100 % | HEART RATE: 80 BPM | WEIGHT: 157.4 LBS | TEMPERATURE: 98.3 F

## 2021-08-18 DIAGNOSIS — R03.0 ELEVATED BLOOD PRESSURE READING WITHOUT DIAGNOSIS OF HYPERTENSION: Primary | ICD-10-CM

## 2021-08-18 DIAGNOSIS — R10.32 ACUTE LEFT LOWER QUADRANT PAIN: ICD-10-CM

## 2021-08-18 PROCEDURE — 99214 OFFICE O/P EST MOD 30 MIN: CPT | Performed by: STUDENT IN AN ORGANIZED HEALTH CARE EDUCATION/TRAINING PROGRAM

## 2021-08-18 RX ORDER — IBUPROFEN 600 MG/1
600 TABLET ORAL
Qty: 30 TABLET | Refills: 0 | Status: SHIPPED | OUTPATIENT
Start: 2021-08-18 | End: 2021-09-07

## 2021-08-18 RX ORDER — ACETAMINOPHEN AND CODEINE PHOSPHATE 300; 30 MG/1; MG/1
1 TABLET ORAL
Qty: 10 TABLET | Refills: 0 | Status: SHIPPED | OUTPATIENT
Start: 2021-08-18 | End: 2021-08-21

## 2021-08-18 NOTE — PROGRESS NOTES
Geronimo Wright presents today for   Chief Complaint   Patient presents with    Pelvic Pain       Is someone accompanying this pt? no    Is the patient using any DME equipment during [address]? no    Depression Screening:  3 most recent PHQ Screens 8/18/2021   Little interest or pleasure in doing things Not at all   Feeling down, depressed, irritable, or hopeless Not at all   Total Score PHQ 2 0   Trouble falling or staying asleep, or sleeping too much -   Feeling tired or having little energy -   Poor appetite, weight loss, or overeating -   Feeling bad about yourself - or that you are a failure or have let yourself or your family down -   Trouble concentrating on things such as school, work, reading, or watching TV -   Moving or speaking so slowly that other people could have noticed; or the opposite being so fidgety that others notice -   Thoughts of being better off dead, or hurting yourself in some way -   PHQ 9 Score -   How difficult have these problems made it for you to do your work, take care of your home and get along with others -       Learning Assessment:  Learning Assessment 10/3/2014   PRIMARY LEARNER Patient   PRIMARY LANGUAGE ENGLISH   LEARNER PREFERENCE PRIMARY LISTENING     DEMONSTRATION   ANSWERED BY patient   RELATIONSHIP SELF       Abuse Screening:  Abuse Screening Questionnaire 8/18/2021   Do you ever feel afraid of your partner? N   Are you in a relationship with someone who physically or mentally threatens you? N   Is it safe for you to go home? Y       Fall Risk  Fall Risk Assessment, last 12 mths 7/8/2019   Able to walk? Yes   Fall in past 12 months? No       Health Maintenance reviewed and discussed and ordered per Provider. Health Maintenance Due   Topic Date Due    COVID-19 Vaccine (1) Never done    Colorectal Cancer Screening Combo  Never done   . Coordination of Care:  1. Have you been to the ER, urgent care clinic since your last visit? Hospitalized since your last visit? no    2. Have you seen or consulted any other health care providers outside of the [address] [place] [address] since your last visit? Include any pap smears or colon screening.  no      Last  Checked na  Last UDS Checked na  Last Pain contract signed: [email or web address]

## 2021-08-18 NOTE — PROGRESS NOTES
Evelyn Rubinstein is a 55 y.o.  female and presents with    Chief Complaint   Patient presents with    Pelvic Pain           Subjective: For the last 2 hours pt has been having sharp pain in the RLQ. She does have a history of hysterectomy with R oophorectomy . This was an emergency surgery. States that when she had the surgery she began feeling what she is feeling at this time. Last time she saw Dr. Almanzar Balaji -GYN about 1 yr ago. At that time she had had an ultrasound and they had discussed about the possibility of having the left oophorectomy. Pt has the result from that ultrasound that showed  abnormal apperance of L ovary w/ thick walled complex, presumably hemorrhagic cyst largely replacing the ovary. Patient also complains of dyspareunia. Patient Active Problem List   Diagnosis Code    Anemia D64.9    S/P laparoscopic assisted vaginal hysterectomy (LAVH) Z90.710    Depression F32.9      Past Medical History:   Diagnosis Date    Anemia 10/13/2014    Depression     Fibroids 2016    H/O excision of dermoid cyst 2016      Past Surgical History:   Procedure Laterality Date    HX  SECTION  ,     HX CYST REMOVAL      right ovary    HX HYSTERECTOMY      due to heavy menses and uterine fibroids.      HX OOPHORECTOMY Right 2013    HX TUBAL LIGATION  2001      Family History   Problem Relation Age of Onset    Diabetes Mother     Heart Disease Mother     Heart Attack Mother     Heart Disease Father     Hypertension Father     Cancer Father         renal cancer    Diabetes Maternal Grandmother      Social History     Socioeconomic History    Marital status: SINGLE     Spouse name: Not on file    Number of children: Not on file    Years of education: Not on file    Highest education level: Not on file   Occupational History    Not on file   Tobacco Use    Smoking status: Never Smoker    Smokeless tobacco: Never Used Substance and Sexual Activity    Alcohol use: Yes     Comment: occ once a month.  Drug use: No    Sexual activity: Yes     Partners: Male     Birth control/protection: None, Surgical   Other Topics Concern    Not on file   Social History Narrative    Not on file     Social Determinants of Health     Financial Resource Strain:     Difficulty of Paying Living Expenses:    Food Insecurity:     Worried About Running Out of Food in the Last Year:     [address] in the Last Year:    Transportation Needs:     Lack of Transportation (Medical):  Lack of Transportation (Non-Medical):    Physical Activity:     Days of Exercise per Week:     Minutes of Exercise per Session:    Stress:     Feeling of Stress :    Social Connections:     Frequency of Communication with Friends and Family:     Frequency of Social Gatherings with Friends and Family:     Attends [employer] Services:     Active Member of Clubs or Organizations:     Attends Club or Organization Meetings:     Marital Status:    Intimate Partner Violence:     Fear of Current or Ex-Partner:     Emotionally Abused:     Physically Abused:     Sexually Abused:         Current Outpatient Medications   Medication Sig Dispense Refill    cholecalciferol (VITAMIN D3) (1000 Units /25 mcg) tablet Take  by mouth daily.  calamine-zinc oxide (CALAMINE) solution aaa bid prn (Patient not taking: Reported on 7/23/2021) 177 mL 0    diphenhydrAMINE hcl (BenadryL) 2 % topical gel Apply  to affected area every six (6) hours as needed for Allergies or Itching. (Patient not taking: Reported on 7/23/2021) 37.5 g 0        ROS   Review of Systems   Constitutional: Negative for chills, fever and malaise/fatigue. HENT: Negative for congestion, ear discharge and ear pain. Eyes: Negative for blurred vision, pain and discharge. Respiratory: Negative for cough and shortness of breath. Cardiovascular: Negative for chest pain and palpitations. Gastrointestinal: Positive for abdominal pain. Negative for nausea and vomiting. Genitourinary: Negative for dysuria, frequency and urgency. Skin: Negative for itching and rash. Neurological: Negative for dizziness, seizures, loss of consciousness and headaches. Psychiatric/Behavioral: Negative for substance abuse. Objective:  Vitals:    08/18/21 1430   BP: (!) 148/86   Pulse: 80   Resp: 20   Temp: 98.3 °F (36.8 °C)   SpO2: 100%   Weight: 157 lb 6.4 oz (71.4 kg)   PainSc:  10 - Worst pain ever   PainLoc: Pelvic   LMP: 01/06/2017       Physical Exam  Vitals reviewed. Constitutional:       Appearance: Normal appearance. She is obese. Eyes:      General: No scleral icterus. Right eye: No discharge. Left eye: No discharge. Cardiovascular:      Rate and Rhythm: Normal rate and regular rhythm. Pulses: Normal pulses. Pulmonary:      Effort: Pulmonary effort is normal. No respiratory distress. Breath sounds: Normal breath sounds. Abdominal:      General: Bowel sounds are normal.      Palpations: Abdomen is soft. Tenderness: There is abdominal tenderness in the left lower quadrant. There is no guarding or rebound. Musculoskeletal:      Cervical back: Normal range of motion and neck supple. Neurological:      Mental Status: She is alert and oriented to person, place, and time. Cranial Nerves: No cranial nerve deficit. Psychiatric:         Mood and Affect: Mood normal.         Behavior: Behavior normal.         Thought Content: Thought content normal.         Judgment: Judgment normal.           LABS     TESTS      Assessment/Plan:    1. Acute left lower quadrant pain  Pt needs to follow w/ GYN  - ibuprofen (MOTRIN) 600 mg tablet; Take 1 Tablet by mouth every six (6) hours as needed for Pain for up to 20 days. Dispense: 30 Tablet; Refill: 0  - acetaminophen-codeine (Tylenol-Codeine #3) 300-30 mg per tablet;  Take 1 Tablet by mouth every six (6) hours as needed for Pain for up to 3 days. Max Daily Amount: 4 Tablets. Dispense: 10 Tablet; Refill: 0    2. Elevated blood pressure reading without diagnosis of hypertension  Likely d/t pain        Lab review: no lab studies available for review at time of visit      I have discussed the diagnosis with the patient and the intended plan as seen in the above orders. The patient has received an after-visit summary and questions were answered concerning future plans. I have discussed medication side effects and warnings with the patient as well. I have reviewed the plan of care with the patient, accepted their input and they are in agreement with the treatment goals.          Jaya Dennis MD

## 2021-09-02 ENCOUNTER — OFFICE VISIT (OUTPATIENT)
Dept: FAMILY MEDICINE CLINIC | Age: 46
End: 2021-09-02
Payer: MEDICAID

## 2021-09-02 VITALS
WEIGHT: 188.4 LBS | TEMPERATURE: 97.9 F | DIASTOLIC BLOOD PRESSURE: 86 MMHG | SYSTOLIC BLOOD PRESSURE: 140 MMHG | HEART RATE: 61 BPM | BODY MASS INDEX: 34.67 KG/M2 | HEIGHT: 62 IN | OXYGEN SATURATION: 100 %

## 2021-09-02 DIAGNOSIS — I10 ESSENTIAL HYPERTENSION: ICD-10-CM

## 2021-09-02 DIAGNOSIS — R10.32 LEFT LOWER QUADRANT PAIN: ICD-10-CM

## 2021-09-02 DIAGNOSIS — E78.00 ELEVATED LDL CHOLESTEROL LEVEL: ICD-10-CM

## 2021-09-02 DIAGNOSIS — Z12.11 SCREEN FOR COLON CANCER: Primary | ICD-10-CM

## 2021-09-02 DIAGNOSIS — E55.9 VITAMIN D DEFICIENCY: ICD-10-CM

## 2021-09-02 DIAGNOSIS — Z01.419 WOMEN'S ANNUAL ROUTINE GYNECOLOGICAL EXAMINATION: ICD-10-CM

## 2021-09-02 PROCEDURE — 99396 PREV VISIT EST AGE 40-64: CPT | Performed by: STUDENT IN AN ORGANIZED HEALTH CARE EDUCATION/TRAINING PROGRAM

## 2021-09-02 RX ORDER — AMLODIPINE BESYLATE 2.5 MG/1
2.5 TABLET ORAL DAILY
Qty: 30 TABLET | Refills: 0 | Status: SHIPPED | OUTPATIENT
Start: 2021-09-02 | End: 2021-10-26 | Stop reason: SDUPTHER

## 2021-09-02 NOTE — PROGRESS NOTES
Qing Childers is a 55 y.o.  female and presents with    No chief complaint on file. Subjective:    Last LMP: FULL hysterectomy in 2016. Only has L ovary left  Patient denies any abnormal vaginal discharge, pain or bleeding. She is  sexually active with 1 partner. Patient has not followed up with Dr. Kasey Hammond regarding the cyst on her left ovary. States that she is having an appointment, she will be getting a call soon to set up the appointment. Patient not taking any medication at this time for her blood pressure. However she is concerned due to having significant issues in her family of cardiac history. Patient Active Problem List   Diagnosis Code    Anemia D64.9    S/P laparoscopic assisted vaginal hysterectomy (LAVH) Z90.710    Depression F32.9      Past Medical History:   Diagnosis Date    Anemia 10/13/2014    Depression     Fibroids 2016    H/O excision of dermoid cyst 2016      Past Surgical History:   Procedure Laterality Date    HX  SECTION  ,     HX CYST REMOVAL      right ovary    HX HYSTERECTOMY      due to heavy menses and uterine fibroids.  HX OOPHORECTOMY Right 2013    HX TUBAL LIGATION  2001      Family History   Problem Relation Age of Onset    Diabetes Mother     Heart Disease Mother     Heart Attack Mother     Heart Disease Father     Hypertension Father     Cancer Father         renal cancer    Diabetes Maternal Grandmother      Social History     Socioeconomic History    Marital status: SINGLE     Spouse name: Not on file    Number of children: Not on file    Years of education: Not on file    Highest education level: Not on file   Occupational History    Not on file   Tobacco Use    Smoking status: Never Smoker    Smokeless tobacco: Never Used   Substance and Sexual Activity    Alcohol use: Yes     Comment: occ once a month.      Drug use: No    Sexual activity: Yes     Partners: Male     Birth control/protection: None, Surgical   Other Topics Concern    Not on file   Social History Narrative    Not on file     Social Determinants of Health     Financial Resource Strain:     Difficulty of Paying Living Expenses:    Food Insecurity:     Worried About Running Out of Food in the Last Year:     [address] in the Last Year:    Transportation Needs:     Lack of Transportation (Medical):  Lack of Transportation (Non-Medical):    Physical Activity:     Days of Exercise per Week:     Minutes of Exercise per Session:    Stress:     Feeling of Stress :    Social Connections:     Frequency of Communication with Friends and Family:     Frequency of Social Gatherings with Friends and Family:     Attends Buddhism Services:     Active Member of Clubs or Organizations:     Attends Club or Organization Meetings:     Marital Status:    Intimate Partner Violence:     Fear of Current or Ex-Partner:     Emotionally Abused:     Physically Abused:     Sexually Abused:         Current Outpatient Medications   Medication Sig Dispense Refill    ibuprofen (MOTRIN) 600 mg tablet Take 1 Tablet by mouth every six (6) hours as needed for Pain for up to 20 days. 30 Tablet 0    calamine-zinc oxide (CALAMINE) solution aaa bid prn (Patient not taking: Reported on 7/23/2021) 177 mL 0    diphenhydrAMINE hcl (BenadryL) 2 % topical gel Apply  to affected area every six (6) hours as needed for Allergies or Itching. (Patient not taking: Reported on 7/23/2021) 37.5 g 0    cholecalciferol (VITAMIN D3) (1000 Units /25 mcg) tablet Take  by mouth daily. ROS   Review of Systems   Constitutional: Negative for chills, fever and malaise/fatigue. HENT: Negative for congestion, ear discharge and ear pain. Eyes: Negative for blurred vision, pain and discharge. Respiratory: Negative for cough and shortness of breath. Cardiovascular: Negative for chest pain and palpitations.    Gastrointestinal: Positive for abdominal pain. Negative for nausea and vomiting. Genitourinary: Negative for dysuria, frequency and urgency. Skin: Negative for itching and rash. Neurological: Negative for dizziness, seizures, loss of consciousness and headaches. Psychiatric/Behavioral: Negative for substance abuse. Objective:  Vitals:    09/02/21 0905   BP: (!) 140/86   Pulse: 61   Temp: 97.9 °F (36.6 °C)   TempSrc: Oral   SpO2: 100%   Weight: 188 lb 6.4 oz (85.5 kg)   Height: 5' 2\" (1.575 m)   PainSc:   0 - No pain   LMP: 01/06/2017       Physical Exam  Vitals reviewed. Constitutional:       Appearance: Normal appearance. HENT:      Right Ear: Tympanic membrane, ear canal and external ear normal. There is no impacted cerumen. Left Ear: Tympanic membrane, ear canal and external ear normal. There is no impacted cerumen. Nose: Nose normal. No congestion or rhinorrhea. Mouth/Throat:      Mouth: Mucous membranes are dry. Pharynx: Oropharynx is clear. No oropharyngeal exudate or posterior oropharyngeal erythema. Eyes:      General: No scleral icterus. Right eye: No discharge. Left eye: No discharge. Extraocular Movements: Extraocular movements intact. Cardiovascular:      Rate and Rhythm: Normal rate and regular rhythm. Heart sounds: No murmur heard. Pulmonary:      Effort: Pulmonary effort is normal. No respiratory distress. Breath sounds: Normal breath sounds. No stridor. No wheezing, rhonchi or rales. Chest:      Chest wall: No mass, lacerations, deformity, swelling, tenderness, crepitus or edema. Breasts: Breasts are symmetrical.         Right: Normal.         Left: Normal.   Abdominal:      General: Abdomen is flat. Bowel sounds are normal.      Palpations: Abdomen is soft. Tenderness: There is no abdominal tenderness. Hernia: There is no hernia in the left inguinal area or right inguinal area.    Genitourinary:     Pubic Area: No rash or pubic lice. Labia:         Right: No rash, tenderness, lesion or injury. Left: No rash, tenderness, lesion or injury. Urethra: No urethral swelling or urethral lesion. Vagina: No signs of injury. No vaginal discharge, tenderness or lesions. Uterus: Absent. Adnexa:         Left: Tenderness present. Musculoskeletal:         General: No swelling, tenderness, deformity or signs of injury. Normal range of motion. Cervical back: Normal range of motion and neck supple. Right lower leg: No edema. Left lower leg: No edema. Lymphadenopathy:      Cervical: No cervical adenopathy. Upper Body:      Right upper body: No supraclavicular, axillary or pectoral adenopathy. Left upper body: No supraclavicular, axillary or pectoral adenopathy. Lower Body: No right inguinal adenopathy. No left inguinal adenopathy. Skin:     General: Skin is warm and dry. Neurological:      Mental Status: She is alert and oriented to person, place, and time. Cranial Nerves: No cranial nerve deficit. Deep Tendon Reflexes: Reflexes normal.   Psychiatric:         Mood and Affect: Mood normal.         Behavior: Behavior normal.         Thought Content: Thought content normal.           LABS     TESTS      Assessment/Plan:    1. Screen for colon cancer  - COLOGUARD TEST (FECAL DNA COLORECTAL CANCER SCREENING); Future    2. Women's annual routine gynecological examination  Pt had hysterectomy    3. Left lower quadrant pain  Pt is to follow up w/ Dr. Wannetta Cockayne     4. Elevated LDL cholesterol level  - LIPID PANEL; Future  - METABOLIC PANEL, COMPREHENSIVE; Future  - CBC WITH AUTOMATED DIFF; Future    5. Vitamin D deficiency  - VITAMIN D, 25 HYDROXY; Future    6. Essential hypertension  - amLODIPine (NORVASC) 2.5 mg tablet; Take 1 Tablet by mouth daily. Dispense: 30 Tablet;  Refill: 0\    Lab review: orders written for new lab studies as appropriate; see orders      I have discussed the diagnosis with the patient and the intended plan as seen in the above orders. The patient has received an after-visit summary and questions were answered concerning future plans. I have discussed medication side effects and warnings with the patient as well. I have reviewed the plan of care with the patient, accepted their input and they are in agreement with the treatment goals.          Kan Rodriguez MD

## 2021-09-03 ENCOUNTER — PATIENT OUTREACH (OUTPATIENT)
Dept: CASE MANAGEMENT | Age: 46
End: 2021-09-03

## 2021-09-03 LAB
25(OH)D3 SERPL-MCNC: 21.5 NG/ML (ref 32–100)
A-G RATIO,AGRAT: 1.3 RATIO (ref 1.1–2.6)
ABSOLUTE LYMPHOCYTE COUNT, 10803: 1.4 K/UL (ref 1–4.8)
ALBUMIN SERPL-MCNC: 4.1 G/DL (ref 3.5–5)
ALP SERPL-CCNC: 109 U/L (ref 25–115)
ALT SERPL-CCNC: 22 U/L (ref 5–40)
ANION GAP SERPL CALC-SCNC: 9 MMOL/L (ref 3–15)
AST SERPL W P-5'-P-CCNC: 21 U/L (ref 10–37)
BASOPHILS # BLD: 0 K/UL (ref 0–0.2)
BASOPHILS NFR BLD: 1 % (ref 0–2)
BILIRUB SERPL-MCNC: 0.1 MG/DL (ref 0.2–1.2)
BUN SERPL-MCNC: 9 MG/DL (ref 6–22)
CALCIUM SERPL-MCNC: 9.5 MG/DL (ref 8.4–10.5)
CHLORIDE SERPL-SCNC: 103 MMOL/L (ref 98–110)
CHOLEST SERPL-MCNC: 198 MG/DL (ref 110–200)
CO2 SERPL-SCNC: 25 MMOL/L (ref 20–32)
CREAT SERPL-MCNC: 0.7 MG/DL (ref 0.5–1.2)
EOSINOPHIL # BLD: 0.2 K/UL (ref 0–0.5)
EOSINOPHIL NFR BLD: 4 % (ref 0–6)
ERYTHROCYTE [DISTWIDTH] IN BLOOD BY AUTOMATED COUNT: 13.4 % (ref 10–15.5)
GFRAA, 66117: >60
GFRNA, 66118: >60
GLOBULIN,GLOB: 3.2 G/DL (ref 2–4)
GLUCOSE SERPL-MCNC: 91 MG/DL (ref 70–99)
GRANULOCYTES,GRANS: 56 % (ref 40–75)
HCT VFR BLD AUTO: 43.5 % (ref 35.1–48)
HDLC SERPL-MCNC: 3.7 MG/DL (ref 0–5)
HDLC SERPL-MCNC: 53 MG/DL
HGB BLD-MCNC: 13.4 G/DL (ref 11.7–16)
LDL/HDL RATIO,LDHD: 2.5
LDLC SERPL CALC-MCNC: 133 MG/DL (ref 50–99)
LYMPHOCYTES, LYMLT: 30 % (ref 20–45)
MCH RBC QN AUTO: 30 PG (ref 26–34)
MCHC RBC AUTO-ENTMCNC: 31 G/DL (ref 31–36)
MCV RBC AUTO: 97 FL (ref 81–99)
MONOCYTES # BLD: 0.4 K/UL (ref 0.1–1)
MONOCYTES NFR BLD: 9 % (ref 3–12)
NEUTROPHILS # BLD AUTO: 2.7 K/UL (ref 1.8–7.7)
NON-HDL CHOLESTEROL, 011976: 145 MG/DL
PLATELET # BLD AUTO: 299 K/UL (ref 140–440)
PMV BLD AUTO: 10.4 FL (ref 9–13)
POTASSIUM SERPL-SCNC: 4.4 MMOL/L (ref 3.5–5.5)
PROT SERPL-MCNC: 7.3 G/DL (ref 6.4–8.3)
RBC # BLD AUTO: 4.49 M/UL (ref 3.8–5.2)
SODIUM SERPL-SCNC: 137 MMOL/L (ref 133–145)
TRIGL SERPL-MCNC: 62 MG/DL (ref 40–149)
VLDLC SERPL CALC-MCNC: 12 MG/DL (ref 8–30)
WBC # BLD AUTO: 4.8 K/UL (ref 4–11)

## 2021-09-03 NOTE — PROGRESS NOTES
Complex Case Management      Date/Time:  9/3/2021 1:02 PM    Method of communication with patient:phone    [facility] ([facility]) contacted the patient by telephone to perform Ambulatory Care Coordination. Verified name and  (PHI) with patient as identifiers. Reviewed most recent clinic visit w/ patient who verbalized understanding. Patient given an opportunity to ask questions. Top Challenges reviewed with the patient   1. Has begun Norvasc and wishes to come off in 1-2 months. States she has not been exercising but will begin to \"eat right and exercise\". Instructed in low sodium diet and how to read nutritional labels, verbalized understanding. Asking for a prescription for BP machine to send to insurance company. The patient agrees to contact the PCP office or the [address] [place] [address] for questions related to their healthcare. Provided contact information for future reference. Medication(s):   Medication reconciliation was performed with patient, who verbalizes understanding of administration of home medications. There were no barriers to obtaining medications identified at this time. Referral to Pharm D needed: no     Current Outpatient Medications   Medication Sig    amLODIPine (NORVASC) 2.5 mg tablet Take 1 Tablet by mouth daily.  ibuprofen (MOTRIN) 600 mg tablet Take 1 Tablet by mouth every six (6) hours as needed for Pain for up to 20 days.  cholecalciferol (VITAMIN D3) (1000 Units /25 mcg) tablet Take  by mouth daily.  calamine-zinc oxide (CALAMINE) solution aaa bid prn (Patient not taking: Reported on 2021)    diphenhydrAMINE hcl (BenadryL) 2 % topical gel Apply  to affected area every six (6) hours as needed for Allergies or Itching. (Patient not taking: Reported on 2021)     No current facility-administered medications for this visit. [facility] follow up appointment(s): No future appointments.      Non-[facility] follow up appointment(s): NA    Goals Addressed                    This Visit's Progress      Independent self-management skills to prevent health issues (pt-stated)   No change      Patient wishes to be on track to prevent future health problems by end of year    Follow healthy diet, speak to nutritionist  Increase number of days to exercise  Lose weight  Colorectal exam is due  Enter counseling for depression - has lost several family members over the last year

## 2021-09-09 ENCOUNTER — TELEPHONE (OUTPATIENT)
Dept: FAMILY MEDICINE CLINIC | Age: 46
End: 2021-09-09

## 2021-09-09 NOTE — TELEPHONE ENCOUNTER
Called patient told to call Insurance to get authorization for [employer]. Patient states she will call tomorrow.

## 2021-09-23 DIAGNOSIS — I10 ESSENTIAL HYPERTENSION: Primary | ICD-10-CM

## 2021-09-28 ENCOUNTER — PATIENT OUTREACH (OUTPATIENT)
Dept: CASE MANAGEMENT | Age: 46
End: 2021-09-28

## 2021-09-28 NOTE — PROGRESS NOTES
Complex Case Management      Date/Time:  9/28/2021 4:11 PM     Attempted to reach patient by telephone. Left HIPPA compliant message requesting a return call. Will attempt to reach patient at a later time.

## 2021-10-08 ENCOUNTER — PATIENT OUTREACH (OUTPATIENT)
Dept: CASE MANAGEMENT | Age: 46
End: 2021-10-08

## 2021-10-08 NOTE — PROGRESS NOTES
Complex Case Management      Date/Time:  10/8/2021 4:35 PM     Attempted to reach patient by telephone. Left HIPPA compliant message requesting a return call. Will attempt to reach patient at a later time.

## 2021-10-26 DIAGNOSIS — I10 ESSENTIAL HYPERTENSION: ICD-10-CM

## 2021-10-26 NOTE — TELEPHONE ENCOUNTER
Pt request med refill for Amlodipine. Pt states she was on a 30-trail, but thinks she needs to continue taking this med.   Please assist.

## 2021-10-27 RX ORDER — AMLODIPINE BESYLATE 2.5 MG/1
2.5 TABLET ORAL DAILY
Qty: 30 TABLET | Refills: 0 | Status: SHIPPED | OUTPATIENT
Start: 2021-10-27 | End: 2021-11-22

## 2021-11-03 ENCOUNTER — PATIENT OUTREACH (OUTPATIENT)
Dept: CASE MANAGEMENT | Age: 46
End: 2021-11-03

## 2021-11-03 NOTE — PROGRESS NOTES
Complex Case Management       Date/Time:  11/3/2021 12:59 PM     Attempted to reach patient by telephone. Left HIPPA compliant message requesting a return call. Patient has graduated from the Complex Case Management  program on 11/3/21. Patient's symptoms are stable at this time. Patient/family has the ability to self-manage. Care management goals have been completed at this time. No further ACM follow up scheduled. Goals Addressed                    This Visit's Progress      COMPLETED: Independent self-management skills to prevent health issues (pt-stated)         Patient wishes to be on track to prevent future health problems by end of year    Follow healthy diet, speak to nutritionist  Increase number of days to exercise  Lose weight  Colorectal exam is due  Enter counseling for depression - has lost several family members over the last year                  Pt has ACM's contact information for any further questions, concerns, or needs. Patients upcoming visits:  No future appointments.

## 2021-11-21 DIAGNOSIS — I10 ESSENTIAL HYPERTENSION: ICD-10-CM

## 2021-11-22 RX ORDER — AMLODIPINE BESYLATE 2.5 MG/1
TABLET ORAL
Qty: 30 TABLET | Refills: 0 | Status: SHIPPED | OUTPATIENT
Start: 2021-11-22 | End: 2021-12-21

## 2022-02-15 ENCOUNTER — NURSE TRIAGE (OUTPATIENT)
Dept: OTHER | Facility: CLINIC | Age: 47
End: 2022-02-15

## 2022-02-15 NOTE — TELEPHONE ENCOUNTER
Received call from BODØ at [facility] with [employer]. Subjective: Caller states \"For the last week I have been having off and on dicomfort all over in my chest with some shortness of breath. Heart conditions run in my family, but I also get a reflux burp and burning from time to time. Also, I am a CNA and am \"     Current Symptoms: Chest pain, slight headaches - checks BP and notices some elevation. 136/80 last night 126/88 this morning. Hr 90's, some reports of diarrhea. Onset: 1 week ago; gradual    Associated Symptoms: reduced appetite    Pain Severity: 5/10 when flared up, 0/10 at rest, when not flared-up; aching; intermittent    Temperature: None at this time    What has been tried: Nothing at this time    LMP: NA Pregnant: NA Hx: Hysterectomy    Recommended disposition: See PCP in office, advised to seek care in UC if no available openings / ER if s/s worsen. Care advice provided, patient verbalizes understanding; denies any other questions or concerns; instructed to call back for any new or worsening symptoms. Writer provided warm transfer to Sue at [facility] for appointment scheduling    Attention Provider: Thank you for allowing me to participate in the care of your patient. The patient was connected to triage in response to information provided to the [facility]. Please do not respond through this encounter as the response is not directed to a shared pool.     Reason for Disposition   Chest pain(s) lasting a few seconds persists > 3 days    Protocols used: CHEST PAIN-ADULT-[place]

## 2022-02-16 ENCOUNTER — OFFICE VISIT (OUTPATIENT)
Dept: FAMILY MEDICINE CLINIC | Age: 47
End: 2022-02-16
Payer: MEDICAID

## 2022-02-16 ENCOUNTER — HOSPITAL ENCOUNTER (OUTPATIENT)
Dept: LAB | Age: 47
Discharge: HOME | End: 2022-02-16
Payer: MEDICAID

## 2022-02-16 VITALS
WEIGHT: 186.6 LBS | OXYGEN SATURATION: 99 % | TEMPERATURE: 97.8 F | RESPIRATION RATE: 16 BRPM | DIASTOLIC BLOOD PRESSURE: 83 MMHG | BODY MASS INDEX: 34.34 KG/M2 | SYSTOLIC BLOOD PRESSURE: 131 MMHG | HEART RATE: 70 BPM | HEIGHT: 62 IN

## 2022-02-16 DIAGNOSIS — R06.02 SOB (SHORTNESS OF BREATH): ICD-10-CM

## 2022-02-16 DIAGNOSIS — I10 ESSENTIAL HYPERTENSION: ICD-10-CM

## 2022-02-16 DIAGNOSIS — Z82.49 FAMILY HISTORY OF HEART DISEASE: ICD-10-CM

## 2022-02-16 DIAGNOSIS — I10 ESSENTIAL HYPERTENSION: Primary | ICD-10-CM

## 2022-02-16 DIAGNOSIS — K30 INDIGESTION: ICD-10-CM

## 2022-02-16 PROCEDURE — 99213 OFFICE O/P EST LOW 20 MIN: CPT | Performed by: NURSE PRACTITIONER

## 2022-02-16 PROCEDURE — 93005 ELECTROCARDIOGRAM TRACING: CPT

## 2022-02-16 RX ORDER — OMEPRAZOLE 20 MG/1
20 CAPSULE, DELAYED RELEASE ORAL
Qty: 60 CAPSULE | Refills: 0 | Status: SHIPPED | OUTPATIENT
Start: 2022-02-16 | End: 2022-03-16 | Stop reason: SDUPTHER

## 2022-02-16 NOTE — PROGRESS NOTES
OFFICE NOTE    April C Keiko Carballo is a 52 y.o. female presenting today for office visit. 2/16/2022  9:37 AM    Chief Complaint   Patient presents with    Follow-up    Gas     x 2 weeks painful gas feeling that travels rom chest to side. HPI:   Pratima Crease patient. HTN, she takes her BP at home 122/86. Patient says she has a history of indigestion and acid reflux. She worries about her heart because she has a history of heart disease. She would like a EKG and a referral to cardiology. Patient has chest pain at times \"that moves around after she or at night when is laying down. \"  She has also gained weight and she hasn't bought bigger bras which creates more pressure under her breasts and epigastric. 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 Patients last menstrual period, history of hysterectomy. Patient doesn't smoke anything and uses alcohol occasionally. Patient reports appetite is good, no urinary issues, sleeps well and regular bowel movements. Patient denies fever, chills, chest pain, abdomen pain or dark tarry stool. ROS:    · General: negative for - chills, fever, weight changes or malaise  · HEENT: no sore throat, nasal congestion, vision problems or ear problems  · Respiratory: no shortness of breath, cough, or wheezing  · Cardiovascular: + chest pain \"that moves around\", + dyspnea on exertion at times, no palpitations,  · Gastrointestinal: + indigestion, no abdominal pain, N/V, change in bowel habits, or black or bloody stools  · Musculoskeletal: no back pain, joint pain, joint stiffness, muscle pain or muscle weakness  · Neurological: no numbness, tingling, headache or dizziness  · Endo:  No polyuria or polydipsia. · : no hematuria, dysuria, frequency, hesitancy, or nocturia.     · Skin: No itching or rash, no open skin, no unusual lesions   · Psychological: negative for - anxiety, depression, sleep disturbances, suicidal or homicidal ideations     PHQ Screening   3 most recent PHQ Screens 2022   Little interest or pleasure in doing things Not at all   Feeling down, depressed, irritable, or hopeless Not at all   Total Score PHQ 2 0   Trouble falling or staying asleep, or sleeping too much -   Feeling tired or having little energy -   Poor appetite, weight loss, or overeating -   Feeling bad about yourself - or that you are a failure or have let yourself or your family down -   Trouble concentrating on things such as school, work, reading, or watching TV -   Moving or speaking so slowly that other people could have noticed; or the opposite being so fidgety that others notice -   Thoughts of being better off dead, or hurting yourself in some way -   PHQ 9 Score -   How difficult have these problems made it for you to do your work, take care of your home and get along with others -     History  Past Medical History:   Diagnosis Date    Anemia 10/13/2014    Depression     Fibroids 2016    H/O excision of dermoid cyst 2016     Past Surgical History:   Procedure Laterality Date    HX  SECTION  ,     HX CYST REMOVAL      right ovary    HX HYSTERECTOMY  2016    due to heavy menses and uterine fibroids.  HX OOPHORECTOMY Right 2013    HX TUBAL LIGATION  2001     Social History     Socioeconomic History    Marital status: SINGLE     Spouse name: Not on file    Number of children: Not on file    Years of education: Not on file    Highest education level: Not on file   Occupational History    Not on file   Tobacco Use    Smoking status: Never Smoker    Smokeless tobacco: Never Used   Substance and Sexual Activity    Alcohol use: Yes     Comment: occ once a month.      Drug use: No    Sexual activity: Yes     Partners: Male     Birth control/protection: None, Surgical   Other Topics Concern    Not on file   Social History Narrative    Not on file Social Determinants of Health     Financial Resource Strain:     Difficulty of Paying Living Expenses: Not on file   Food Insecurity:     Worried About Running Out of Food in the Last Year: Not on file    Dre of Food in the Last Year: Not on file   Transportation Needs:     Lack of Transportation (Medical): Not on file    Lack of Transportation (Non-Medical): Not on file   Physical Activity:     Days of Exercise per Week: Not on file    Minutes of Exercise per Session: Not on file   Stress:     Feeling of Stress : Not on file   Social Connections:     Frequency of Communication with Friends and Family: Not on file    Frequency of Social Gatherings with Friends and Family: Not on file    Attends Religion Services: Not on file    Active Member of [address] [place] [address] [employer] or Organizations: Not on file    Attends Club or Organization Meetings: Not on file    Marital Status: Not on file   Intimate Partner Violence:     Fear of Current or Ex-Partner: Not on file    Emotionally Abused: Not on file    Physically Abused: Not on file    Sexually Abused: Not on file   Housing Stability:     Unable to Pay for Housing in the Last Year: Not on file    Number of [place] in the Last Year: Not on file    Unstable Housing in the Last Year: Not on file     No Known Allergies    Current Outpatient Medications   Medication Sig Dispense Refill    omeprazole (PRILOSEC) 20 mg capsule Take 1 Capsule by mouth daily as needed (reflux). 60 Capsule 0    amLODIPine (NORVASC) 2.5 mg tablet TAKE 1 TABLET BY MOUTH EVERY DAY 90 Tablet 0    cholecalciferol (VITAMIN D3) (1000 Units /25 mcg) tablet Take  by mouth daily. Patient Care Team:  Patient Care Team:  Armand Davila MD as PCP - General (Family Medicine)  Armand Davila MD as PCP - [address] [place] [address] [facility]    Physical Exam  Patient appears well, she is pleasant, alert, oriented x 3, in no distress. ENT normal.  Neck supple.  No adenopathy or thyromegaly. JOAQUIN. Lungs are clear, good air entry, no wheezes, rhonchi or rales. Cardiovascular, S1 and S2 normal, no murmurs, regular rate and rhythm. No LAD. Chest wall negative for tenderness  Abdomen is soft without tenderness  /Anorectal, deferred. Muscleskeletal, no swelling  Extremities show no edema  Neurological is normal without focal findings. Skin: no concerning lesions. Psych: normal affect. Mood good. Oriented x 3. Judgement and insight intact. Vitals:    02/16/22 0927   BP: 131/83   Pulse: 70   Resp: 16   Temp: 97.8 °F (36.6 °C)   SpO2: 99%   Weight: 186 lb 9.6 oz (84.6 kg)   Height: 5' 2\" (1.575 m)   PainSc:   0 - No pain   LMP: 01/06/2017     Assessment and Plan    Diagnoses and all orders for this visit:    Essential hypertension  -     EKG, 12 LEAD, INITIAL; Future  -     REFERRAL TO CARDIOLOGY    Indigestion  -     EKG, 12 LEAD, INITIAL; Future  -     REFERRAL TO CARDIOLOGY  -     omeprazole (PRILOSEC) 20 mg capsule; Take 1 Capsule by mouth daily as needed (reflux). , Normal, Disp-60 Capsule, R-0    Family history of heart disease  -     EKG, 12 LEAD, INITIAL; Future  -     REFERRAL TO CARDIOLOGY    SOB (shortness of breath)  -     EKG, 12 LEAD, INITIAL; Future  -     REFERRAL TO CARDIOLOGY         *Plan of care reviewed with patient. Patient in agreement with plan and expresses understanding. All questions answered and patient encouraged to call or RTO if further questions or concerns. 50% of 30 minutes spent on counseling and managing her care. Follow-up and Dispositions    · Return in about 3 months (around 5/16/2022).        LYNDA Dolan

## 2022-02-16 NOTE — PROGRESS NOTES
There was a change in your EKG from 2017. Now shows a septal infarct age undetermined. We will keep your cardiology appointment.

## 2022-02-16 NOTE — PROGRESS NOTES
Eva Carlos is a 52 y.o. female (: 1975) presenting to address:    Chief Complaint   Patient presents with    Follow-up       Vitals:    22 0927   Pulse: 70   Resp: 16   Temp: 97.8 °F (36.6 °C)   SpO2: 99%   Weight: 186 lb 9.6 oz (84.6 kg)   Height: 5' 2\" (1.575 m)   PainSc:   0 - No pain   LMP: 2017       Hearing/Vision:   No exam data present    Learning Assessment:     Learning Assessment 10/3/2014   PRIMARY LEARNER Patient   PRIMARY LANGUAGE ENGLISH   LEARNER PREFERENCE PRIMARY LISTENING     DEMONSTRATION   ANSWERED BY patient   RELATIONSHIP SELF     Depression Screening:     3 most recent PHQ Screens 2022   Little interest or pleasure in doing things Not at all   Feeling down, depressed, irritable, or hopeless Not at all   Total Score PHQ 2 0   Trouble falling or staying asleep, or sleeping too much -   Feeling tired or having little energy -   Poor appetite, weight loss, or overeating -   Feeling bad about yourself - or that you are a failure or have let yourself or your family down -   Trouble concentrating on things such as school, work, reading, or watching TV -   Moving or speaking so slowly that other people could have noticed; or the opposite being so fidgety that others notice -   Thoughts of being better off dead, or hurting yourself in some way -   PHQ 9 Score -   How difficult have these problems made it for you to do your work, take care of your home and get along with others -     Fall Risk Assessment:     Fall Risk Assessment, last 12 mths 2019   Able to walk? Yes   Fall in past 12 months? No     Abuse Screening:     Abuse Screening Questionnaire 2022   Do you ever feel afraid of your partner? N   Are you in a relationship with someone who physically or mentally threatens you? N   Is it safe for you to go home? Y     ADL Assessment:   No flowsheet data found. Coordination of Care Questionaire:   1.  \"Have you been to the ER, urgent care clinic since your last visit? Hospitalized since your last visit? \" No    2. \"Have you seen or consulted any other health care providers outside of the [address] [place] [address] since your last visit? \" No     3. For patients aged 39-70: Has the patient had a colonoscopy? Yes - no Care Gap present     If the patient is female:    4. For patients aged 41-77: Has the patient had a mammogram within the past 2 years? Yes - no Care Gap present    5. For patients aged 21-65: Has the patient had a pap smear? Yes - no Care Gap present    Advanced Directive:   1. Do you have an Advanced Directive? NO    2. Would you like information on Advanced Directives?  NO

## 2022-02-16 NOTE — PATIENT INSTRUCTIONS
DASH Diet: Care Instructions  Your Care Instructions     The DASH diet is an eating plan that can help lower your blood pressure. DASH stands for Dietary Approaches to Stop Hypertension. Hypertension is high blood pressure. The DASH diet focuses on eating foods that are high in calcium, potassium, and magnesium. These nutrients can lower blood pressure. The foods that are highest in these nutrients are fruits, vegetables, low-fat dairy products, nuts, seeds, and legumes. But taking calcium, potassium, and magnesium supplements instead of eating foods that are high in those nutrients does not have the same effect. The DASH diet also includes whole grains, fish, and poultry. The DASH diet is one of several lifestyle changes your doctor may recommend to lower your high blood pressure. Your doctor may also want you to decrease the amount of sodium in your diet. Lowering sodium while following the DASH diet can lower blood pressure even further than just the DASH diet alone. Follow-up care is a key part of your treatment and safety. Be sure to make and go to all appointments, and call your doctor if you are having problems. It's also a good idea to know your test results and keep a list of the medicines you take. How can you care for yourself at home? Following the DASH diet  · Eat 4 to 5 servings of fruit each day. A serving is 1 medium-sized piece of fruit, ½ cup chopped or canned fruit, 1/4 cup dried fruit, or 4 ounces (½ cup) of fruit juice. Choose fruit more often than fruit juice. · Eat 4 to 5 servings of vegetables each day. A serving is 1 cup of lettuce or raw leafy vegetables, ½ cup of chopped or cooked vegetables, or 4 ounces (½ cup) of vegetable juice. Choose vegetables more often than vegetable juice. · Get 2 to 3 servings of low-fat and fat-free dairy each day. A serving is 8 ounces of milk, 1 cup of yogurt, or 1 ½ ounces of cheese. · Eat 6 to 8 servings of grains each day.  A serving is 1 slice of bread, 1 ounce of dry cereal, or ½ cup of cooked rice, pasta, or cooked cereal. Try to choose whole-grain products as much as possible. · Limit lean meat, poultry, and fish to 2 servings each day. A serving is 3 ounces, about the size of a deck of cards. · Eat 4 to 5 servings of nuts, seeds, and legumes (cooked dried beans, lentils, and split peas) each week. A serving is 1/3 cup of nuts, 2 tablespoons of seeds, or ½ cup of cooked beans or peas. · Limit fats and oils to 2 to 3 servings each day. A serving is 1 teaspoon of vegetable oil or 2 tablespoons of salad dressing. · Limit sweets and added sugars to 5 servings or less a week. A serving is 1 tablespoon jelly or jam, ½ cup sorbet, or 1 cup of lemonade. · Eat less than 2,300 milligrams (mg) of sodium a day. If you limit your sodium to 1,500 mg a day, you can lower your blood pressure even more. · Be aware that all of these are the suggested number of servings for people who eat 1,800 to 2,000 calories a day. Your recommended number of servings may be different if you need more or fewer calories. Tips for success  · Start small. Do not try to make dramatic changes to your diet all at once. You might feel that you are missing out on your favorite foods and then be more likely to not follow the plan. Make small changes, and stick with them. Once those changes become habit, add a few more changes. · Try some of the following:  ? Make it a goal to eat a fruit or vegetable at every meal and at snacks. This will make it easy to get the recommended amount of fruits and vegetables each day. ? Try yogurt topped with fruit and nuts for a snack or healthy dessert. ? Add lettuce, tomato, cucumber, and onion to sandwiches. ? Combine a ready-made pizza crust with low-fat mozzarella cheese and lots of vegetable toppings. Try using tomatoes, squash, spinach, broccoli, carrots, cauliflower, and onions. ?  Have a variety of cut-up vegetables with a low-fat dip as an appetizer instead of chips and dip. ? Sprinkle sunflower seeds or chopped almonds over salads. Or try adding chopped walnuts or almonds to cooked vegetables. ? Try some vegetarian meals using beans and peas. Add garbanzo or kidney beans to salads. Make burritos and tacos with mashed vieira beans or black beans. Where can you learn more? Go to http://www.chavez.com/  Enter H967 in the search box to learn more about \"DASH Diet: Care Instructions. \"  Current as of: April 29, 2021               Content Version: 13.0  © 2136-0802 [employer]. Care instructions adapted under license by [employer] (which disclaims liability or warranty for this information). If you have questions about a medical condition or this instruction, always ask your healthcare professional. [address] any warranty or liability for your use of this information.

## 2022-02-17 ENCOUNTER — DOCUMENTATION ONLY (OUTPATIENT)
Dept: FAMILY MEDICINE CLINIC | Age: 47
End: 2022-02-17

## 2022-02-17 LAB
ATRIAL RATE: 63 BPM
CALCULATED P AXIS, ECG09: 37 DEGREES
CALCULATED R AXIS, ECG10: 45 DEGREES
CALCULATED T AXIS, ECG11: 36 DEGREES
DIAGNOSIS, 93000: NORMAL
P-R INTERVAL, ECG05: 132 MS
Q-T INTERVAL, ECG07: 402 MS
QRS DURATION, ECG06: 76 MS
QTC CALCULATION (BEZET), ECG08: 411 MS
VENTRICULAR RATE, ECG03: 63 BPM

## 2022-02-17 NOTE — PROGRESS NOTES
Patient I spoke by phone today related to her abnormal results on EKG. Patient has an appointment with Dr. Agustin Horn in April 2022. Patient was advised if she was experience shortness of breath, left arm pain neck pain and/or chest pain to respond to ED.

## 2022-03-16 DIAGNOSIS — K30 INDIGESTION: ICD-10-CM

## 2022-03-16 NOTE — TELEPHONE ENCOUNTER
Requested Prescriptions     Pending Prescriptions Disp Refills    omeprazole (PRILOSEC) 20 mg capsule 60 Capsule 0     Sig: Take 1 Capsule by mouth daily as needed (reflux).

## 2022-03-17 RX ORDER — OMEPRAZOLE 20 MG/1
20 CAPSULE, DELAYED RELEASE ORAL
Qty: 60 CAPSULE | Refills: 0 | Status: SHIPPED | OUTPATIENT
Start: 2022-03-17 | End: 2022-04-08

## 2022-04-05 ENCOUNTER — OFFICE VISIT (OUTPATIENT)
Dept: CARDIOLOGY CLINIC | Age: 47
End: 2022-04-05
Payer: MEDICAID

## 2022-04-05 VITALS
DIASTOLIC BLOOD PRESSURE: 70 MMHG | WEIGHT: 185 LBS | SYSTOLIC BLOOD PRESSURE: 128 MMHG | TEMPERATURE: 97.5 F | BODY MASS INDEX: 33.84 KG/M2 | HEART RATE: 74 BPM | OXYGEN SATURATION: 97 %

## 2022-04-05 DIAGNOSIS — R07.9 CHEST PAIN, UNSPECIFIED TYPE: ICD-10-CM

## 2022-04-05 DIAGNOSIS — I10 HYPERTENSION, UNSPECIFIED TYPE: Primary | ICD-10-CM

## 2022-04-05 PROCEDURE — 99204 OFFICE O/P NEW MOD 45 MIN: CPT | Performed by: INTERNAL MEDICINE

## 2022-04-05 NOTE — LETTER
4/5/2022    Patient: Kanwal Ennis   YOB: 1975   Date of Visit: 4/5/2022     Ermelinda Carney MD  [address] [facility] [id number]  [address] [place] [place]  [address] [facility]  [address]ls[phone]  [address] [place]    Dear Ermelinda Carney, [address] [facility], NP,      Thank you for referring Ms. Kanwal Jennings to [address] [place] [address] for evaluation. My notes for this consultation are attached. If you have questions, please do not hesitate to call me. I look forward to following your patient along with you.       Sincerely,    Seng Landeros MD

## 2022-04-05 NOTE — PROGRESS NOTES
Identified pt with two pt identifiers(name and ). Reviewed record in preparation for visit and have obtained necessary documentation. Kanwal Chaudhry presents today for No chief complaint on file. Pt denies  DIZZINESS, SOB, CHEST PAIN/ PRESSURE, FATIGUE/WEAKNESS, HEADACHES, SWELLING. Kanwal Chaudhry preferred language for health care discussion is english/other. Personal Protective Equipment:   Personal Protective Equipment was used including: mask-surgical and hands-gloves. Patient was placed on no precaution(s). Patient was masked. Precautions:   Patient currently on None  Patient currently roomed with door closed. Is someone accompanying this pt? No     Is the patient using any DME equipment during OV? No     Depression Screening:  3 most recent PHQ Screens 2022   Little interest or pleasure in doing things Not at all   Feeling down, depressed, irritable, or hopeless Not at all   Total Score PHQ 2 0   Trouble falling or staying asleep, or sleeping too much -   Feeling tired or having little energy -   Poor appetite, weight loss, or overeating -   Feeling bad about yourself - or that you are a failure or have let yourself or your family down -   Trouble concentrating on things such as school, work, reading, or watching TV -   Moving or speaking so slowly that other people could have noticed; or the opposite being so fidgety that others notice -   Thoughts of being better off dead, or hurting yourself in some way -   PHQ 9 Score -   How difficult have these problems made it for you to do your work, take care of your home and get along with others -       Learning Assessment:  Learning Assessment 10/3/2014   PRIMARY LEARNER Patient   PRIMARY LANGUAGE ENGLISH   LEARNER PREFERENCE PRIMARY LISTENING     DEMONSTRATION   ANSWERED BY patient   RELATIONSHIP SELF       Abuse Screening:  Abuse Screening Questionnaire 2022   Do you ever feel afraid of your partner?  N   Are you in a relationship with someone who physically or mentally threatens you? N   Is it safe for you to go home? Y       Fall Risk  Fall Risk Assessment, last 12 mths 7/8/2019   Able to walk? Yes   Fall in past 12 months? No       Pt currently taking Anticoagulant therapy? No  Pt currently taking Antiplatelet therapy? No     Coordination of Care:  1. Have you been to the ER, urgent care clinic since your last visit? Hospitalized since your last visit? Yes. 03/26/2022 . [address]    2. Have you seen or consulted any other health care providers outside of the [address] [place] [address] since your last visit? Include any pap smears or colon screening. No       Please see Red banners under Allergies and Med Rec to remove outside inquires. All correct information has been verified with patient and added to chart.      Medication's patient's would liked removed has been marked not taking to be removed per Verbal order and read back per Leander Huerta MD

## 2022-04-05 NOTE — PROGRESS NOTES
[facility]    59-year-old female with history of depression, fiber    Patient is here today for cardiac evaluation. She denies any prior history of MI or CHF. Because of abnormal EKG and chest pain patient was asked to come see me. Patient recently went to emergency department after having a chest discomfort where she had a negative cardiac biomarker and low risk stress test.  Patient is working as CNA and sometimes she has this chest discomfort which feels like musculoskeletal in nature. She does not report any chest pain or chest tightness concerning for angina. No exertional limitation. She has recently started going to the gym and she is able to walk on a treadmill without any limitation. No chest pain or chest tightness or any nausea vomiting or diaphoresis. Denies any PND or lower semiswelling. Denies any nausea, vomiting, abdominal pain, fever, chills, sputum production. No hematuria or other bleeding complaints    Past Medical History:   Diagnosis Date    Anemia 10/13/2014    Depression     Fibroids 2016    H/O excision of dermoid cyst 2016       Review of Systems:  Cardiac symptoms as noted above in HPI. All others negative. Denies fatigue, malaise, skin rash, joint pain, blurring vision, photophobia, neck pain, hemoptysis, chronic cough, nausea, vomiting, hematuria, burning micturition, BRBPR, chronic headaches. Current Outpatient Medications   Medication Sig    omeprazole (PRILOSEC) 20 mg capsule Take 1 Capsule by mouth daily as needed (reflux).  amLODIPine (NORVASC) 2.5 mg tablet TAKE 1 TABLET BY MOUTH EVERY DAY    cholecalciferol (VITAMIN D3) (1000 Units /25 mcg) tablet Take  by mouth daily. No current facility-administered medications for this visit.        Past Surgical History:   Procedure Laterality Date    HX  SECTION  ,     HX CYST REMOVAL      right ovary  HX HYSTERECTOMY  2016    due to heavy menses and uterine fibroids.  HX OOPHORECTOMY Right 2013    HX TUBAL LIGATION  4/2001       Allergies and Sensitivities:  No Known Allergies    Family History:  Family History   Problem Relation Age of Onset    Diabetes Mother     Heart Disease Mother     Heart Attack Mother     Heart Disease Father     Hypertension Father     Cancer Father         renal cancer    Diabetes Maternal Grandmother        Social History:  Social History     Tobacco Use    Smoking status: Never Smoker    Smokeless tobacco: Never Used   Substance Use Topics    Alcohol use: Yes     Comment: occ once a month.  Drug use: No     She  reports that she has never smoked. She has never used smokeless tobacco.  She  reports current alcohol use. Physical Exam:  BP Readings from Last 3 Encounters:   04/05/22 128/70   02/16/22 131/83   09/02/21 (!) 140/86         Pulse Readings from Last 3 Encounters:   04/05/22 74   02/16/22 70   09/02/21 61          Wt Readings from Last 3 Encounters:   04/05/22 83.9 kg (185 lb)   02/16/22 84.6 kg (186 lb 9.6 oz)   09/02/21 85.5 kg (188 lb 6.4 oz)       Constitutional: Oriented to person, place, and time. HENT: Head: Normocephalic and atraumatic. Eyes: Conjunctivae and extraocular motions are normal.   Neck: No JVD present. Carotid bruit is not appreciated. Cardiovascular: Regular rhythm. No murmur, gallop or rubs appreciated  Lung: Breath sounds normal. No respiratory distress. No ronchi or rales appreciated  Abdominal: No tenderness. No rebound and no guarding. Musculoskeletal: There is no lower extremity edema. No cynosis  Lymphadenopathy:  No cervical or supraclavicular adenopathy appriciated. Neurological: No gross motor deficit noted. Skin: No visible skin rash noted. No Ear discharge noted  Psychiatric: Normal mood and affect.      LABS:   @  Lab Results   Component Value Date/Time    WBC 4.8 09/02/2021 10:02 AM    HGB 13.4 09/02/2021 10:02 AM    HCT 43.5 2021 10:02 AM    PLATELET 997  10:02 AM    MCV 97 2021 10:02 AM     Lab Results   Component Value Date/Time    Sodium 137 2021 10:02 AM    Potassium 4.4 2021 10:02 AM    Chloride 103 2021 10:02 AM    CO2 25 2021 10:02 AM    Glucose 91 2021 10:02 AM    BUN 9 2021 10:02 AM    Creatinine 0.7 2021 10:02 AM     Lipids Latest Ref Rng & Units 2018   Chol, Total 110 - 200 mg/dL 198 174 175   HDL >=40 mg/dL 53 55 57   LDL 50 - 99 mg/dL 133(H) 108(H) 107(H)   Trig 40 - 149 mg/dL 62 58 52   Some recent data might be hidden     Lab Results   Component Value Date/Time    ALT (SGPT) 22 2021 10:02 AM     Lab Results   Component Value Date/Time    Hemoglobin A1c 5.5 2020 09:07 AM     Lab Results   Component Value Date/Time    TSH 0.53 2020 09:45 AM     [id number]. Sinus rhythm. Possible old septal infarct. Otherwise normal EKG. STRESS TEST (2022)    * Functional capacity is fair. Exercise duration 8 min 11 seconds    * Patient did not have chest pain in response to stress.     * No ST segment changes suggestive of ischemia under stress.     * Normal left ventricular systolic function with no regional wall motion abnormalities noted at rest.     * Left ventricular size is smaller post stress.     * Overall global left ventricular systolic function increased post stress.     * At rest left ventricular chamber dimension is normal.     * No regional wall motion abnormalities noted post stress.     * Low probability of ischemia. CATHETERIZATION    IMPRESSION & PLAN:  72-year-old female    Hypertension:  Started amlodipine approximately 6 months ago. Currently on amlodipine 2.5 mg daily. Blood pressure 128/70.   Echo to rule out abnormal cardiac structure and pericardial disease especially with abnormal EKG  Nonpharmacologic dimension of blood pressure management including but not limited to salt restriction, weight loss, regular exercise, dietary potassium discussed    Chest pain:  Partially reproducible on exam.  Discussed with the patient that likely musculoskeletal in nature  ER visit in 03/2022 with atypical chest pain. Cardiac biomarker unremarkable. Stress echocardiogram, low risk. Findings discussed with the patient in detail and she was reassured  Advised NSAID for as needed use    Obesity:  . She weighs 185 pound with BMI of 33. She has recently started working on dietary modification and exercise plan. Continue with exercise plan. Goal weight of 25 pound weight loss over 12 months discussed    This plan was discussed with patient who is in agreement. Thank you for allowing me to participate in patient care. Please feel free to call me if you have any question or concern. Michelle Silva MD  Please note: This document has been produced using voice recognition software. Unrecognized errors in transcription may be present.

## 2022-04-08 DIAGNOSIS — K30 INDIGESTION: ICD-10-CM

## 2022-04-08 RX ORDER — OMEPRAZOLE 20 MG/1
20 CAPSULE, DELAYED RELEASE ORAL
Qty: 30 CAPSULE | Refills: 1 | Status: SHIPPED | OUTPATIENT
Start: 2022-04-08 | End: 2022-10-10 | Stop reason: SDUPTHER

## 2022-04-11 NOTE — PERIOP NOTES
10ml Floseal to pelvis.
1424:  Patient received on a stretcher from OR. Attached to monitors. VSS. Attached to oxygen at 10L. OR report, anesthesia report and MAR acknowledged. Will continue to monitor. 1514:  Patient requesting to stay overnight for observation. Will page Dr. Ailyn Lowery.
1536  Received update for lunch relief from [address] [place] [address].
INTERCEED WAS USED.
Bill For Surgical Tray: no
Performing Laboratory: 0
Billing Type: Third-Party Bill
Expected Date Of Service: 04/11/2022

## 2022-10-10 ENCOUNTER — OFFICE VISIT (OUTPATIENT)
Dept: FAMILY MEDICINE CLINIC | Age: 47
End: 2022-10-10
Payer: MEDICAID

## 2022-10-10 VITALS
WEIGHT: 185.2 LBS | TEMPERATURE: 97.4 F | SYSTOLIC BLOOD PRESSURE: 134 MMHG | HEART RATE: 72 BPM | OXYGEN SATURATION: 98 % | BODY MASS INDEX: 34.08 KG/M2 | HEIGHT: 62 IN | RESPIRATION RATE: 17 BRPM | DIASTOLIC BLOOD PRESSURE: 72 MMHG

## 2022-10-10 DIAGNOSIS — F32.1 MODERATE MAJOR DEPRESSION (HCC): ICD-10-CM

## 2022-10-10 DIAGNOSIS — R10.9 RIGHT FLANK PAIN: ICD-10-CM

## 2022-10-10 DIAGNOSIS — Z28.21 REFUSED INFLUENZA VACCINE: ICD-10-CM

## 2022-10-10 DIAGNOSIS — K30 INDIGESTION: ICD-10-CM

## 2022-10-10 DIAGNOSIS — R10.2 PELVIC PAIN: Primary | ICD-10-CM

## 2022-10-10 LAB
BILIRUB UR QL STRIP: NEGATIVE
GLUCOSE UR-MCNC: NEGATIVE MG/DL
KETONES P FAST UR STRIP-MCNC: NEGATIVE MG/DL
PH UR STRIP: 7 [PH] (ref 4.6–8)
PROT UR QL STRIP: NEGATIVE
SP GR UR STRIP: 1.02 (ref 1–1.03)
UA UROBILINOGEN AMB POC: NORMAL (ref 0.2–1)
URINALYSIS CLARITY POC: CLEAR
URINALYSIS COLOR POC: YELLOW
URINE BLOOD POC: NEGATIVE
URINE LEUKOCYTES POC: NEGATIVE
URINE NITRITES POC: NEGATIVE

## 2022-10-10 PROCEDURE — 99214 OFFICE O/P EST MOD 30 MIN: CPT | Performed by: FAMILY MEDICINE

## 2022-10-10 PROCEDURE — 81001 URINALYSIS AUTO W/SCOPE: CPT | Performed by: FAMILY MEDICINE

## 2022-10-10 RX ORDER — OMEPRAZOLE 20 MG/1
20 CAPSULE, DELAYED RELEASE ORAL
Qty: 30 CAPSULE | Refills: 1 | Status: SHIPPED | OUTPATIENT
Start: 2022-10-10

## 2022-10-10 RX ORDER — IBUPROFEN 800 MG/1
800 TABLET ORAL
Qty: 30 TABLET | Refills: 0 | Status: SHIPPED | OUTPATIENT
Start: 2022-10-10

## 2022-10-10 NOTE — PROGRESS NOTES
Vish Snyder is a 52 y.o. female (: 1975) presenting to address:    Chief Complaint   Patient presents with    Back Pain    Side Pain    UTI   No flu shot    There were no vitals filed for this visit. Hearing/Vision:   No results found. Learning Assessment:     Learning Assessment 10/3/2014   PRIMARY LEARNER Patient   PRIMARY LANGUAGE ENGLISH   LEARNER PREFERENCE PRIMARY LISTENING     DEMONSTRATION   ANSWERED BY patient   RELATIONSHIP SELF     Depression Screening:     3 most recent PHQ Screens 10/10/2022   Little interest or pleasure in doing things Not at all   Feeling down, depressed, irritable, or hopeless Not at all   Total Score PHQ 2 0   Trouble falling or staying asleep, or sleeping too much -   Feeling tired or having little energy -   Poor appetite, weight loss, or overeating -   Feeling bad about yourself - or that you are a failure or have let yourself or your family down -   Trouble concentrating on things such as school, work, reading, or watching TV -   Moving or speaking so slowly that other people could have noticed; or the opposite being so fidgety that others notice -   Thoughts of being better off dead, or hurting yourself in some way -   PHQ 9 Score -   How difficult have these problems made it for you to do your work, take care of your home and get along with others -     Fall Risk Assessment:     Fall Risk Assessment, last 12 mths 2019   Able to walk? Yes   Fall in past 12 months? No     Abuse Screening:     Abuse Screening Questionnaire 2022   Do you ever feel afraid of your partner? N   Are you in a relationship with someone who physically or mentally threatens you? N   Is it safe for you to go home? Y     ADL Assessment:   No flowsheet data found. Coordination of Care Questionaire:     1. \"Have you been to the ER, urgent care clinic since your last visit? Hospitalized since your last visit? \" No    2.  \"Have you seen or consulted any other health care providers outside of the [address] [place] [address] since your last visit? \" No     3. For patients aged 39-70: Has the patient had a colonoscopy / FIT/ Cologuard? Yes - no Care Gap present      If the patient is female:    4. For patients aged 41-77: Has the patient had a mammogram within the past 2 years? No      5. For patients aged 21-65: Has the patient had a pap smear?  No

## 2022-10-11 LAB
A-G RATIO,AGRAT: 1.5 RATIO (ref 1.1–2.6)
ABSOLUTE LYMPHOCYTE COUNT, 10803: 1.8 K/UL (ref 1–4.8)
ALBUMIN SERPL-MCNC: 4.3 G/DL (ref 3.5–5)
ALP SERPL-CCNC: 107 U/L (ref 25–115)
ALT SERPL-CCNC: 19 U/L (ref 5–40)
ANION GAP SERPL CALC-SCNC: 8 MMOL/L (ref 3–15)
AST SERPL W P-5'-P-CCNC: 18 U/L (ref 10–37)
BASOPHILS # BLD: 0 K/UL (ref 0–0.2)
BASOPHILS NFR BLD: 1 % (ref 0–2)
BILIRUB SERPL-MCNC: <0.1 MG/DL (ref 0.2–1.2)
BUN SERPL-MCNC: 12 MG/DL (ref 6–22)
CALCIUM SERPL-MCNC: 9.4 MG/DL (ref 8.4–10.5)
CHLORIDE SERPL-SCNC: 104 MMOL/L (ref 98–110)
CO2 SERPL-SCNC: 28 MMOL/L (ref 20–32)
CREAT SERPL-MCNC: 1.1 MG/DL (ref 0.5–1.2)
EOSINOPHIL # BLD: 0.1 K/UL (ref 0–0.5)
EOSINOPHIL NFR BLD: 3 % (ref 0–6)
ERYTHROCYTE [DISTWIDTH] IN BLOOD BY AUTOMATED COUNT: 13.2 % (ref 10–15.5)
GLOBULIN,GLOB: 2.8 G/DL (ref 2–4)
GLOMERULAR FILTRATION RATE: 59.8 ML/MIN/1.73 SQ.M.
GLUCOSE SERPL-MCNC: 87 MG/DL (ref 70–99)
GRANULOCYTES,GRANS: 51 % (ref 40–75)
HCT VFR BLD AUTO: 42.8 % (ref 35.1–48)
HGB BLD-MCNC: 12.9 G/DL (ref 11.7–16)
LYMPHOCYTES, LYMLT: 37 % (ref 20–45)
MCH RBC QN AUTO: 30 PG (ref 26–34)
MCHC RBC AUTO-ENTMCNC: 30 G/DL (ref 31–36)
MCV RBC AUTO: 99 FL (ref 80–99)
MONOCYTES # BLD: 0.4 K/UL (ref 0.1–1)
MONOCYTES NFR BLD: 8 % (ref 3–12)
NEUTROPHILS # BLD AUTO: 2.4 K/UL (ref 1.8–7.7)
PLATELET # BLD AUTO: 298 K/UL (ref 140–440)
PMV BLD AUTO: 9.9 FL (ref 9–13)
POTASSIUM SERPL-SCNC: 4.3 MMOL/L (ref 3.5–5.5)
PROT SERPL-MCNC: 7.1 G/DL (ref 6.4–8.3)
RBC # BLD AUTO: 4.34 M/UL (ref 3.8–5.2)
SODIUM SERPL-SCNC: 140 MMOL/L (ref 133–145)
WBC # BLD AUTO: 4.7 K/UL (ref 4–11)

## 2022-11-14 ENCOUNTER — OFFICE VISIT (OUTPATIENT)
Dept: FAMILY MEDICINE CLINIC | Age: 47
End: 2022-11-14
Payer: MEDICAID

## 2022-11-14 VITALS
HEIGHT: 62 IN | DIASTOLIC BLOOD PRESSURE: 87 MMHG | SYSTOLIC BLOOD PRESSURE: 137 MMHG | HEART RATE: 68 BPM | RESPIRATION RATE: 16 BRPM | WEIGHT: 189 LBS | BODY MASS INDEX: 34.78 KG/M2 | TEMPERATURE: 97.2 F | OXYGEN SATURATION: 99 %

## 2022-11-14 DIAGNOSIS — I10 ESSENTIAL HYPERTENSION: ICD-10-CM

## 2022-11-14 DIAGNOSIS — R10.2 PELVIC PAIN: ICD-10-CM

## 2022-11-14 DIAGNOSIS — E89.41 SYMPTOMATIC POSTSURGICAL MENOPAUSE: Primary | ICD-10-CM

## 2022-11-14 DIAGNOSIS — N89.8 VAGINAL DISCHARGE: ICD-10-CM

## 2022-11-14 PROCEDURE — 3078F DIAST BP <80 MM HG: CPT | Performed by: STUDENT IN AN ORGANIZED HEALTH CARE EDUCATION/TRAINING PROGRAM

## 2022-11-14 PROCEDURE — 99214 OFFICE O/P EST MOD 30 MIN: CPT | Performed by: STUDENT IN AN ORGANIZED HEALTH CARE EDUCATION/TRAINING PROGRAM

## 2022-11-14 PROCEDURE — 3074F SYST BP LT 130 MM HG: CPT | Performed by: STUDENT IN AN ORGANIZED HEALTH CARE EDUCATION/TRAINING PROGRAM

## 2022-11-14 RX ORDER — VENLAFAXINE HYDROCHLORIDE 37.5 MG/1
37.5 CAPSULE, EXTENDED RELEASE ORAL DAILY
Qty: 30 CAPSULE | Refills: 2 | Status: SHIPPED | OUTPATIENT
Start: 2022-11-14

## 2022-11-14 RX ORDER — AMLODIPINE BESYLATE 2.5 MG/1
2.5 TABLET ORAL DAILY
Qty: 90 TABLET | Refills: 1 | Status: SHIPPED | OUTPATIENT
Start: 2022-11-14

## 2022-11-14 NOTE — PROGRESS NOTES
Daryl Mathur is a 52 y.o.  female and presents with    Chief Complaint   Patient presents with    Physical    Well Woman           Depression     PHQ= 10           Subjective:    Hysterectomy in 2016. She has been very germain, she gets upset easy. Gets hot flashes. She is concerned bc she does not seem to be able to have conversations sometimes w/o crying. Would like to see if her sx can be treated. Pt also states she has been having slight pain in her LLQ. Patient Active Problem List   Diagnosis Code    Anemia D64.9    S/P laparoscopic assisted vaginal hysterectomy (LAVH) Z90.710    Depression F32. A      Past Medical History:   Diagnosis Date    Anemia 10/13/2014    Depression     Fibroids 2016    H/O excision of dermoid cyst 2016    HTN (hypertension)       Past Surgical History:   Procedure Laterality Date    HX  SECTION  ,     HX CYST REMOVAL      right ovary    HX HYSTERECTOMY      due to heavy menses and uterine fibroids. HX OOPHORECTOMY Right 2013    HX TUBAL LIGATION  2001      Family History   Problem Relation Age of Onset    Diabetes Mother     Heart Disease Mother     Heart Attack Mother     Heart Disease Father     Hypertension Father     Cancer Father         renal cancer    Diabetes Maternal Grandmother      Social History     Socioeconomic History    Marital status: SINGLE     Spouse name: Not on file    Number of children: Not on file    Years of education: Not on file    Highest education level: Not on file   Occupational History    Not on file   Tobacco Use    Smoking status: Never    Smokeless tobacco: Never   Substance and Sexual Activity    Alcohol use: Yes     Comment: occ once a month.      Drug use: No    Sexual activity: Yes     Partners: Male     Birth control/protection: None, Surgical   Other Topics Concern    Not on file   Social History Narrative    Not on file     Social Determinants of Health     Financial Resource Strain: Not on file   Food Insecurity: Not on file   Transportation Needs: Not on file   Physical Activity: Not on file   Stress: Not on file   Social Connections: Not on file   Intimate Partner Violence: Not on file   Housing Stability: Not on file        Current Outpatient Medications   Medication Sig Dispense Refill    omeprazole (PRILOSEC) 20 mg capsule Take 1 Capsule by mouth daily as needed (reflux). 30 Capsule 1    ibuprofen (MOTRIN) 800 mg tablet Take 1 Tablet by mouth every eight (8) hours as needed for Pain. 30 Tablet 0    amLODIPine (NORVASC) 2.5 mg tablet TAKE 1 TABLET BY MOUTH EVERY DAY 90 Tablet 1    cholecalciferol (VITAMIN D3) (1000 Units /25 mcg) tablet Take  by mouth daily. ROS   Review of Systems   Constitutional:  Negative for chills, fever and malaise/fatigue. HENT:  Negative for congestion, ear discharge and ear pain. Eyes:  Negative for blurred vision, pain and discharge. Respiratory:  Negative for cough and shortness of breath. Cardiovascular:  Negative for chest pain and palpitations. Gastrointestinal:  Negative for abdominal pain, nausea and vomiting. Genitourinary:  Negative for dysuria, frequency and urgency. Skin:  Negative for itching and rash. Neurological:  Negative for dizziness, seizures, loss of consciousness and headaches. Psychiatric/Behavioral:  Negative for substance abuse. Objective:  Vitals:    11/14/22 1358 11/14/22 1401   BP: (!) 142/89 137/87   Pulse: 68    Resp: 16    Temp: 97.2 °F (36.2 °C)    TempSrc: Temporal    SpO2: 99%    Weight: 189 lb (85.7 kg)    Height: 5' 2\" (1.575 m)    PainSc:   0 - No pain    LMP: 01/06/2017       Physical Exam  Constitutional:       Appearance: Normal appearance. Eyes:      General: No scleral icterus. Right eye: No discharge. Left eye: No discharge. Pulmonary:      Effort: Pulmonary effort is normal. No respiratory distress.    Abdominal:      Hernia: There is no hernia in the right inguinal area. Genitourinary:     Labia:         Right: No rash or tenderness. Left: No rash or tenderness. Uterus: Absent. Adnexa:         Right: No tenderness. Left: Tenderness present. No mass or fullness. Musculoskeletal:      Cervical back: Normal range of motion and neck supple. Lymphadenopathy:      Lower Body: No right inguinal adenopathy. Neurological:      Mental Status: She is alert and oriented to person, place, and time. Cranial Nerves: No cranial nerve deficit. Psychiatric:         Mood and Affect: Mood normal.         Behavior: Behavior normal.         Thought Content: Thought content normal.         Judgment: Judgment normal.         LABS     TESTS      Assessment/Plan:    1. Essential hypertension  - amLODIPine (NORVASC) 2.5 mg tablet; Take 1 Tablet by mouth daily. Dispense: 90 Tablet; Refill: 1    2. Symptomatic postsurgical menopause  - venlafaxine-SR (EFFEXOR-XR) 37.5 mg capsule; Take 1 Capsule by mouth daily. Dispense: 30 Capsule; Refill: 2    3. Pelvic pain  - US PELV NON OB W TV; Future    4. Vaginal discharge  - NUSWAB VG PLUS+MYCOPLASMAS,ALEXIA; Future        Lab review: orders written for new lab studies as appropriate; see orders      I have discussed the diagnosis with the patient and the intended plan as seen in the above orders. The patient has received an after-visit summary and questions were answered concerning future plans. I have discussed medication side effects and warnings with the patient as well. I have reviewed the plan of care with the patient, accepted their input and they are in agreement with the treatment goals.          Vincenzo Carrel, MD

## 2022-11-14 NOTE — PROGRESS NOTES
Antwan Baker is a 52 y.o. presents today for   Chief Complaint   Patient presents with    Physical    Well Woman           Depression     PHQ= 10     Is someone accompanying this pt? No    Is the patient using any DME equipment during OV? No    Visit Vitals  /87 (BP 1 Location: Right arm)   Pulse 68   Temp 97.2 °F (36.2 °C) (Temporal)   Resp 16   Ht 5' 2\" (1.575 m)   Wt 189 lb (85.7 kg)   SpO2 99%   BMI 34.57 kg/m²       Depression Screening:   3 most recent PHQ Screens 11/14/2022   Little interest or pleasure in doing things Not at all   Feeling down, depressed, irritable, or hopeless Nearly every day   Total Score PHQ 2 3   Trouble falling or staying asleep, or sleeping too much Nearly every day   Feeling tired or having little energy Several days   Poor appetite, weight loss, or overeating Several days   Feeling bad about yourself - or that you are a failure or have let yourself or your family down Several days   Trouble concentrating on things such as school, work, reading, or watching TV Several days   Moving or speaking so slowly that other people could have noticed; or the opposite being so fidgety that others notice Not at all   Thoughts of being better off dead, or hurting yourself in some way Not at all   PHQ 9 Score 10   How difficult have these problems made it for you to do your work, take care of your home and get along with others Somewhat difficult       Health Maintenance: reviewed and discussed and ordered per Provider. Health Maintenance Due   Topic Date Due    COVID-19 Vaccine (3 - Booster for Moderna series) 08/08/2021         Coordination of Care:   1. \"Have you been to the ER, urgent care clinic since your last visit? Hospitalized since your last visit? \" No    2. \"Have you seen or consulted any other health care providers outside of the [address] [place] [address] since your last visit? \" No     3. For patients aged 39-70: Has the patient had a colonoscopy / FIT/ Cologuard?  Yes - no Care Gap present    If the patient is female:    4. For patients aged 41-77: Has the patient had a mammogram within the past 2 years? Yes - no Care Gap present    5. For patients aged 21-65: Has the patient had a pap smear?  Yes - no Care Gap present     [address] [place] [address]

## 2022-11-15 LAB
BACTERIAL VAGINOSIS, NAA: NEGATIVE
CANDIDA GLABRATA, NAA, 180057: NEGATIVE
CANDIDA SPECIES, NAA: NEGATIVE
CHLAMYDIA TRACHOMATIS, NAA, 180097: NEGATIVE
NEISSERIA GONORRHOEAE, NAA, 180104: NEGATIVE
TRICH VAG BY NAA, 180087: NEGATIVE

## 2022-11-18 LAB — MYCOPLASMA GENITALIUM, SWAB: NEGATIVE

## 2022-12-20 ENCOUNTER — OFFICE VISIT (OUTPATIENT)
Dept: FAMILY MEDICINE CLINIC | Age: 47
End: 2022-12-20
Payer: MEDICAID

## 2022-12-20 VITALS
OXYGEN SATURATION: 98 % | HEIGHT: 62 IN | SYSTOLIC BLOOD PRESSURE: 129 MMHG | TEMPERATURE: 97.3 F | WEIGHT: 186.2 LBS | HEART RATE: 69 BPM | DIASTOLIC BLOOD PRESSURE: 79 MMHG | BODY MASS INDEX: 34.27 KG/M2 | RESPIRATION RATE: 16 BRPM

## 2022-12-20 DIAGNOSIS — R10.32 LLQ PAIN: Primary | ICD-10-CM

## 2022-12-20 DIAGNOSIS — M25.552 HIP PAIN, BILATERAL: ICD-10-CM

## 2022-12-20 DIAGNOSIS — M25.551 HIP PAIN, BILATERAL: ICD-10-CM

## 2022-12-20 PROCEDURE — 99213 OFFICE O/P EST LOW 20 MIN: CPT | Performed by: STUDENT IN AN ORGANIZED HEALTH CARE EDUCATION/TRAINING PROGRAM

## 2022-12-20 RX ORDER — CYCLOBENZAPRINE HCL 10 MG
10 TABLET ORAL
Qty: 90 TABLET | Refills: 0 | Status: SHIPPED | OUTPATIENT
Start: 2022-12-20 | End: 2022-12-20

## 2022-12-20 NOTE — LETTER
NOTIFICATION RETURN TO WORK / SCHOOL    12/20/2022 9:12 AM    Ms. Kanwal Desai  [address] [place] [id number]      To Whom It May Concern:    Kanwal Granados is currently under the care of [address] [place] [address]. She will return to work on 12/26/22    If there are questions or concerns please have the patient contact our office.         Sincerely,      Mila Thurman MD 26

## 2022-12-20 NOTE — PROGRESS NOTES
Sol Peters is a 52 y.o. presents today for   Chief Complaint   Patient presents with    Hip Pain     Both sides 8/10 for 3 weeks      Is someone accompanying this pt? No    Is the patient using any DME equipment during OV? No    Visit Vitals  /79 (BP 1 Location: Left upper arm, BP Patient Position: Sitting, BP Cuff Size: Adult)   Pulse 69   Temp 97.3 °F (36.3 °C) (Temporal)   Resp 16   Ht 5' 2\" (1.575 m)   Wt 186 lb 3.2 oz (84.5 kg)   SpO2 98%   BMI 34.06 kg/m²       Depression Screening:   3 most recent PHQ Screens 12/20/2022   Little interest or pleasure in doing things Not at all   Feeling down, depressed, irritable, or hopeless Not at all   Total Score PHQ 2 0   Trouble falling or staying asleep, or sleeping too much -   Feeling tired or having little energy -   Poor appetite, weight loss, or overeating -   Feeling bad about yourself - or that you are a failure or have let yourself or your family down -   Trouble concentrating on things such as school, work, reading, or watching TV -   Moving or speaking so slowly that other people could have noticed; or the opposite being so fidgety that others notice -   Thoughts of being better off dead, or hurting yourself in some way -   PHQ 9 Score -   How difficult have these problems made it for you to do your work, take care of your home and get along with others -       Health Maintenance: reviewed and discussed and ordered per Provider. Health Maintenance Due   Topic Date Due    COVID-19 Vaccine (3 - Booster for Moderna series) 08/08/2021         Coordination of Care:   1. \"Have you been to the ER, urgent care clinic since your last visit? Hospitalized since your last visit? \" No    2. \"Have you seen or consulted any other health care providers outside of the [address] [place] [address] since your last visit? \" No     3. For patients aged 39-70: Has the patient had a colonoscopy / FIT/ Cologuard? Yes - no Care Gap present    If the patient is female:    4. For patients aged 41-77: Has the patient had a mammogram within the past 2 years? Yes - no Care Gap present    5. For patients aged 21-65: Has the patient had a pap smear?  Yes - no Care Gap present     [address] [place] [address]

## 2022-12-20 NOTE — PROGRESS NOTES
History of Present Illness   Cyndee Alexander is a 52 y.o. female who presents today for management of    Chief Complaint   Patient presents with    Hip Pain     Both sides 8/10 for 3 weeks        Hip and LLQ pain:  -started 3 weeks ago, worsening over time, 8/10 at worst  -left>>>right  -dull achy pain at night when laying on L side, pain with walking, bending   -pain starts in back and LLQ, deep pain      -Lidocaine patches helped at first, but not anymore  -When pain started, got transvaginal US showing L ovarian cyst  -Hx of hysterectomy and oophorectomy and R ovarian cyst   -Dull achy pain at rest, worsens with movement, walking  -Pain runs in the front towards her LLQ  -Denies fever, vaginal bleeding, dysuria, or vaginal discharge    Patient Active Problem List   Diagnosis Code    Anemia D64.9    S/P laparoscopic assisted vaginal hysterectomy (LAVH) Z90.710    Depression F32. A      Past Medical History:   Diagnosis Date    Anemia 10/13/2014    Depression     Fibroids 2016    H/O excision of dermoid cyst 2016    HTN (hypertension)       Past Surgical History:   Procedure Laterality Date    HX  SECTION  2001    HX CYST REMOVAL      right ovary    HX HYSTERECTOMY  2016    due to heavy menses and uterine fibroids. HX OOPHORECTOMY Right 2013    HX TUBAL LIGATION  2001      Family History   Problem Relation Age of Onset    Diabetes Mother     Heart Disease Mother     Heart Attack Mother     Heart Disease Father     Hypertension Father     Cancer Father         renal cancer    Diabetes Maternal Grandmother      Social History     Socioeconomic History    Marital status: SINGLE     Spouse name: Not on file    Number of children: Not on file    Years of education: Not on file    Highest education level: Not on file   Occupational History    Not on file   Tobacco Use    Smoking status: Never    Smokeless tobacco: Never   Substance and Sexual Activity    Alcohol use:  Yes Comment: occ once a month. Drug use: No    Sexual activity: Yes     Partners: Male     Birth control/protection: None, Surgical   Other Topics Concern    Not on file   Social History Narrative    Not on file     Social Determinants of Health     Financial Resource Strain: Not on file   Food Insecurity: Not on file   Transportation Needs: Not on file   Physical Activity: Not on file   Stress: Not on file   Social Connections: Not on file   Intimate Partner Violence: Not on file   Housing Stability: Not on file        Current Outpatient Medications   Medication Sig Dispense Refill    amLODIPine (NORVASC) 2.5 mg tablet Take 1 Tablet by mouth daily. 90 Tablet 1    venlafaxine-SR (EFFEXOR-XR) 37.5 mg capsule Take 1 Capsule by mouth daily. 30 Capsule 2    omeprazole (PRILOSEC) 20 mg capsule Take 1 Capsule by mouth daily as needed (reflux). 30 Capsule 1    ibuprofen (MOTRIN) 800 mg tablet Take 1 Tablet by mouth every eight (8) hours as needed for Pain. 30 Tablet 0    cholecalciferol (VITAMIN D3) (1000 Units /25 mcg) tablet Take  by mouth daily. ROS   As above HPI, otherwise negative. Physical Exam  Vitals:    22 0808   BP: 129/79   Pulse: 69   Resp: 16   Temp: 97.3 °F (36.3 °C)   TempSrc: Temporal   SpO2: 98%   Weight: 186 lb 3.2 oz (84.5 kg)   Height: 5' 2\" (1.575 m)   PainSc:   8   PainLoc: Hip   LMP: 2017       General: alert, oriented, not in distress  Chest/Lungs: clear breath sounds, no wheezing or crackles  Heart: normal rate, regular rhythm, no murmur  Abdomen: soft, non-distended, LLQ tenderness with deep palpation  Pelvic exam: ADNEXA: tenderness left, no masses.   Hip: full range of motion at hip b/l, log roll neg, straight leg raise elicited pain in LLQ, LETICIA neg, FADDIR elicited pain in LLQ, greater trochanter nontender    Imagin22 Transvaginal US  -Left ovary estimated 12.1 cc volume with venous and arterial flow on spectral Doppler, with an exophytic or extra ovarian left adnexal cyst like structure measuring 4.8 x 2.5 x 2.3 cm.  -Trace cul-de-sac free fluid. Assessment/Plan:    Diagnoses and all orders for this visit:    1. LLQ pain  2. Hip pain, bilateral  Transvaginal US showing L ovarian cyst and trace free fluid. Concern for growing or ruptured cyst given increase in LLQ pain. Advised scheduling NSAIDS. Repeat transvaginal US ordered. Lower concern for MSK origin of pain however given exam will order b/l hip xray. -     US PELV NON OB W TV; Future        -     XR HIP LT W OR WO PELV 2-3 VWS; Future        -     XR HIP RT W OR WO PELV 2-3 VWS; Future            I have discussed the diagnosis with the patient and the intended plan as seen in the above orders. The patient has received an after-visit summary and questions were answered concerning future plans. I have discussed medication side effects and warnings with the patient as well. I have reviewed the plan of care with the patient, accepted their input and they are in agreement with the treatment goals. Follow-up and Dispositions    Return in about 2 weeks (around 1/3/2023) for LLQ pain .          Marian Newell MD   December 20, 2022

## 2022-12-29 ENCOUNTER — OFFICE VISIT (OUTPATIENT)
Dept: FAMILY MEDICINE CLINIC | Age: 47
End: 2022-12-29
Payer: MEDICAID

## 2022-12-29 VITALS
SYSTOLIC BLOOD PRESSURE: 133 MMHG | DIASTOLIC BLOOD PRESSURE: 84 MMHG | TEMPERATURE: 97.8 F | HEIGHT: 62 IN | WEIGHT: 184.4 LBS | OXYGEN SATURATION: 100 % | BODY MASS INDEX: 33.93 KG/M2 | RESPIRATION RATE: 16 BRPM | HEART RATE: 66 BPM

## 2022-12-29 DIAGNOSIS — M25.551 HIP PAIN, BILATERAL: ICD-10-CM

## 2022-12-29 DIAGNOSIS — M25.552 HIP PAIN, BILATERAL: ICD-10-CM

## 2022-12-29 DIAGNOSIS — N83.202 LEFT OVARIAN CYST: Primary | ICD-10-CM

## 2022-12-29 PROCEDURE — 99214 OFFICE O/P EST MOD 30 MIN: CPT | Performed by: STUDENT IN AN ORGANIZED HEALTH CARE EDUCATION/TRAINING PROGRAM

## 2022-12-29 NOTE — PROGRESS NOTES
Marian Maynard is a 52 y.o.  female and presents with    Chief Complaint   Patient presents with    Results    Hip Pain     Both 5/10 dull ache for a month            Subjective:    Pt is here to go over the results from the pelvic ultrasound. Denies any pain at this time. Pt also continues to complain of hip pain b/l for a month. States the pain is worst when she goes from sitting to standing. Calls it a dull ache. Does not interrupt her daily activities. Patient Active Problem List   Diagnosis Code    Anemia D64.9    S/P laparoscopic assisted vaginal hysterectomy (LAVH) Z90.710    Depression F32. A      Past Medical History:   Diagnosis Date    Anemia 10/13/2014    Depression     Fibroids 2016    H/O excision of dermoid cyst 2016    HTN (hypertension)       Past Surgical History:   Procedure Laterality Date    HX  SECTION  ,     HX CYST REMOVAL      right ovary    HX HYSTERECTOMY      due to heavy menses and uterine fibroids. HX OOPHORECTOMY Right 2013    HX TUBAL LIGATION  2001      Family History   Problem Relation Age of Onset    Diabetes Mother     Heart Disease Mother     Heart Attack Mother     Heart Disease Father     Hypertension Father     Cancer Father         renal cancer    Diabetes Maternal Grandmother      Social History     Socioeconomic History    Marital status: SINGLE     Spouse name: Not on file    Number of children: Not on file    Years of education: Not on file    Highest education level: Not on file   Occupational History    Not on file   Tobacco Use    Smoking status: Never    Smokeless tobacco: Never   Substance and Sexual Activity    Alcohol use: Yes     Comment: occ once a month.      Drug use: No    Sexual activity: Yes     Partners: Male     Birth control/protection: None, Surgical   Other Topics Concern    Not on file   Social History Narrative    Not on file     Social Determinants of Health     Financial Resource Strain: Not on file   Food Insecurity: Not on file   Transportation Needs: Not on file   Physical Activity: Not on file   Stress: Not on file   Social Connections: Not on file   Intimate Partner Violence: Not on file   Housing Stability: Not on file        Current Outpatient Medications   Medication Sig Dispense Refill    amLODIPine (NORVASC) 2.5 mg tablet Take 1 Tablet by mouth daily. 90 Tablet 1    venlafaxine-SR (EFFEXOR-XR) 37.5 mg capsule Take 1 Capsule by mouth daily. 30 Capsule 2    omeprazole (PRILOSEC) 20 mg capsule Take 1 Capsule by mouth daily as needed (reflux). 30 Capsule 1    ibuprofen (MOTRIN) 800 mg tablet Take 1 Tablet by mouth every eight (8) hours as needed for Pain. 30 Tablet 0    cholecalciferol (VITAMIN D3) (1000 Units /25 mcg) tablet Take  by mouth daily. ROS   Review of Systems   Constitutional:  Negative for chills, fever and malaise/fatigue. HENT:  Negative for congestion, ear discharge and ear pain. Eyes:  Negative for blurred vision, pain and discharge. Respiratory:  Negative for cough and shortness of breath. Cardiovascular:  Negative for chest pain and palpitations. Gastrointestinal:  Negative for abdominal pain, nausea and vomiting. Genitourinary:  Negative for dysuria, frequency and urgency. Skin:  Negative for itching and rash. Neurological:  Negative for dizziness, seizures, loss of consciousness and headaches. Psychiatric/Behavioral:  Negative for substance abuse. Objective:  Vitals:    12/29/22 0907   BP: 133/84   Pulse: 66   Resp: 16   Temp: 97.8 °F (36.6 °C)   TempSrc: Temporal   SpO2: 100%   Weight: 184 lb 6.4 oz (83.6 kg)   Height: 5' 2\" (1.575 m)   PainSc:   5   PainLoc: Hip   LMP: 01/06/2017       Physical Exam  Vitals reviewed. Constitutional:       Appearance: Normal appearance. Eyes:      General: No scleral icterus. Right eye: No discharge. Left eye: No discharge.    Cardiovascular:      Rate and Rhythm: Normal rate and regular rhythm. Pulmonary:      Effort: Pulmonary effort is normal. No respiratory distress. Breath sounds: Normal breath sounds. Musculoskeletal:      Cervical back: Normal range of motion and neck supple. Neurological:      Mental Status: She is alert and oriented to person, place, and time. Cranial Nerves: No cranial nerve deficit. Psychiatric:         Mood and Affect: Mood normal.         Behavior: Behavior normal.         Thought Content: Thought content normal.         Judgment: Judgment normal.         LABS     TESTS      Assessment/Plan:    1. Left ovarian cyst  Repeat in 6 months. If pain exacerbated then will send to GYN before. Pt verbalized understanding and agrees w/ plan    2. Hip pain, bilateral  Likely 2/2 arthritis       Lab review: no lab studies available for review at time of visit    Over 30 mins spent w/ pt on appt and medical management. I have discussed the diagnosis with the patient and the intended plan as seen in the above orders. The patient has received an after-visit summary and questions were answered concerning future plans. I have discussed medication side effects and warnings with the patient as well. I have reviewed the plan of care with the patient, accepted their input and they are in agreement with the treatment goals.          Cheryl Isbell MD

## 2022-12-29 NOTE — PROGRESS NOTES
Kanwal Carmona is a 52 y.o. presents today for   Chief Complaint   Patient presents with    Results    Hip Pain     Both 5/10 dull ache for a month      Is someone accompanying this pt? No    Is the patient using any DME equipment during OV? No    Visit Vitals  /84 (BP 1 Location: Left upper arm, BP Patient Position: Sitting, BP Cuff Size: Adult)   Pulse 66   Temp 97.8 °F (36.6 °C) (Temporal)   Resp 16   Ht 5' 2\" (1.575 m)   Wt 184 lb 6.4 oz (83.6 kg)   SpO2 100%   BMI 33.73 kg/m²       Depression Screening:   3 most recent PHQ Screens 12/29/2022   Little interest or pleasure in doing things Not at all   Feeling down, depressed, irritable, or hopeless Not at all   Total Score PHQ 2 0   Trouble falling or staying asleep, or sleeping too much -   Feeling tired or having little energy -   Poor appetite, weight loss, or overeating -   Feeling bad about yourself - or that you are a failure or have let yourself or your family down -   Trouble concentrating on things such as school, work, reading, or watching TV -   Moving or speaking so slowly that other people could have noticed; or the opposite being so fidgety that others notice -   Thoughts of being better off dead, or hurting yourself in some way -   PHQ 9 Score -   How difficult have these problems made it for you to do your work, take care of your home and get along with others -       Health Maintenance: reviewed and discussed and ordered per Provider. Health Maintenance Due   Topic Date Due    COVID-19 Vaccine (3 - Booster for Moderna series) 08/08/2021         Coordination of Care:   1. \"Have you been to the ER, urgent care clinic since your last visit? Hospitalized since your last visit? \" No    2. \"Have you seen or consulted any other health care providers outside of the [address] [place] [address] since your last visit? \" No     3. For patients aged 39-70: Has the patient had a colonoscopy / FIT/ Cologuard?  Yes - no Care Gap present    If the patient is female:    4. For patients aged 41-77: Has the patient had a mammogram within the past 2 years? Yes - no Care Gap present    5. For patients aged 21-65: Has the patient had a pap smear?  Yes - no Care Gap present     [address] [place] [address]

## 2022-12-30 ENCOUNTER — TELEPHONE (OUTPATIENT)
Dept: FAMILY MEDICINE CLINIC | Age: 47
End: 2022-12-30

## 2022-12-30 NOTE — TELEPHONE ENCOUNTER
Received a call from [facility] stating the needed Rf for right leg xray. Rf was already in chart but was re faxed for clarification.  Faxed to [phone]

## 2023-01-04 DIAGNOSIS — S70.259A: Primary | ICD-10-CM

## 2023-02-13 DIAGNOSIS — I10 ESSENTIAL HYPERTENSION: Primary | ICD-10-CM

## 2023-02-13 DIAGNOSIS — Z82.49 FAMILY HISTORY OF HEART DISEASE: ICD-10-CM

## 2023-02-13 DIAGNOSIS — K30 INDIGESTION: ICD-10-CM

## 2023-03-03 NOTE — PROGRESS NOTES
44 y/o 6 weeks post-op TLH- robot assit. Pt. Doing well. She denies any abnormal pain or bleeding. She is tolerating a full diet, ambulating well and ready to return to work. Exam is normal. Incisions C/D/I-   Cuff intact, Suture line healing well. No lesions or defects appreciated. Return to work note given. No further pap smears needed. Metronidazole Counseling:  I discussed with the patient the risks of metronidazole including but not limited to seizures, nausea/vomiting, a metallic taste in the mouth, nausea/vomiting and severe allergy.

## 2023-05-02 ENCOUNTER — OFFICE VISIT (OUTPATIENT)
Facility: CLINIC | Age: 48
End: 2023-05-02

## 2023-05-02 VITALS
HEART RATE: 68 BPM | TEMPERATURE: 98.2 F | OXYGEN SATURATION: 98 % | RESPIRATION RATE: 16 BRPM | HEIGHT: 62 IN | SYSTOLIC BLOOD PRESSURE: 125 MMHG | BODY MASS INDEX: 34.89 KG/M2 | WEIGHT: 189.6 LBS | DIASTOLIC BLOOD PRESSURE: 67 MMHG

## 2023-05-02 DIAGNOSIS — K80.20 CALCULUS OF GALLBLADDER WITHOUT CHOLECYSTITIS WITHOUT OBSTRUCTION: ICD-10-CM

## 2023-05-02 DIAGNOSIS — R10.31 RIGHT LOWER QUADRANT ABDOMINAL PAIN: Primary | ICD-10-CM

## 2023-05-02 DIAGNOSIS — R10.13 EPIGASTRIC PAIN: ICD-10-CM

## 2023-05-02 LAB
BILIRUBIN, URINE, POC: NORMAL
BLOOD URINE, POC: NEGATIVE
GLUCOSE URINE, POC: NEGATIVE
KETONES, URINE, POC: NEGATIVE
LEUKOCYTE ESTERASE, URINE, POC: NEGATIVE
NITRITE, URINE, POC: NEGATIVE
PH, URINE, POC: 7 (ref 4.6–8)
PROTEIN,URINE, POC: NEGATIVE
SPECIFIC GRAVITY, URINE, POC: 1.01 (ref 1–1.03)
URINALYSIS CLARITY, POC: CLEAR
URINALYSIS COLOR, POC: YELLOW
UROBILINOGEN, POC: NORMAL

## 2023-05-02 RX ORDER — ESOMEPRAZOLE MAGNESIUM 40 MG/1
40 CAPSULE, DELAYED RELEASE ORAL DAILY
COMMUNITY
Start: 2023-05-02

## 2023-05-02 RX ORDER — POLYETHYLENE GLYCOL 3350 17 G/17G
17 POWDER, FOR SOLUTION ORAL DAILY
COMMUNITY
Start: 2023-05-02

## 2023-05-02 NOTE — PROGRESS NOTES
Nurys Monk (: 1975) is a 50 y.o. female, established patient, here for evaluation of the following chief complaint(s):  ED Follow-up             Subjective:     HPI:  Patient of Dr. Adilia Beatty presents for ED follow up. ED visit on 23 for abdominal pain. She reports that problem started 2 weeks ago following an episode of constipation. She states that after constipation resolved, she had some \"Very dark, almost black stool. \" CT abdomen showed cholelithiasis without acute cholecystitis. She reports intermittent nausea and increased GERD symptoms that is not related to food intake and not relieved with Nexium. She also reports right sided abdominal pain that radiated to right side/flank, most likely related to cholelithiasis. Past Medical History:   Diagnosis Date    Anemia 10/13/2014    Depression     Fibroids 2016    H/O excision of dermoid cyst 2016    HTN (hypertension)        Past Surgical History:   Procedure Laterality Date     SECTION  ,     CYST REMOVAL      right ovary    HYSTERECTOMY (CERVIX STATUS UNKNOWN)      due to heavy menses and uterine fibroids.      OVARY REMOVAL Right 2013    TUBAL LIGATION  2001       Current Outpatient Medications   Medication Sig Dispense Refill    esomeprazole (NEXIUM) 40 MG delayed release capsule Take 1 capsule by mouth daily      polyethylene glycol (GLYCOLAX) 17 GM/SCOOP powder Take 17 g by mouth daily      amLODIPine (NORVASC) 2.5 MG tablet Take 1 tablet by mouth daily      vitamin D (CHOLECALCIFEROL) 25 MCG (1000 UT) TABS tablet Take by mouth daily      ibuprofen (ADVIL;MOTRIN) 800 MG tablet Take 1 tablet by mouth every 8 hours as needed      omeprazole (PRILOSEC) 20 MG delayed release capsule Take 20 mg by mouth daily as needed (Patient not taking: Reported on 2023)      venlafaxine (EFFEXOR XR) 37.5 MG extended release capsule Take 37.5 mg by mouth daily (Patient not taking: Reported on 2023)

## 2023-05-02 NOTE — PROGRESS NOTES
Jamie Kolb is a 50 y.o. presents today for   Chief Complaint   Patient presents with    ED Follow-up       Is someone accompanying this pt? No    Is the patient using any DME equipment during OV? No    Depression Screening:   PHQ-9 Questionaire 12/29/2022 12/20/2022 11/14/2022 10/10/2022 4/5/2022 12/18/2018   Little interest or pleasure in doing things 0 0 0 0 0 0   Feeling down, depressed, or hopeless 0 0 3 0 0 0   Trouble falling or staying asleep, or sleeping too much - - 3 - - -   Feeling tired or having little energy - - 1 - - -   Poor appetite or overeating - - 1 - - -   Feeling bad about yourself - or that you are a failure or have let yourself or your family down - - 1 - - -   Trouble concentrating on things, such as reading the newspaper or watching television - - 1 - - -   Moving or speaking so slowly that other people could have noticed. Or the opposite - being so fidgety or restless that you have been moving around a lot more than usual - - 0 - - -   PHQ-9 Total Score 0 0 10 0 0 0       Abuse Screening:  No flowsheet data found. Learning Assessment:  No question data found. Fall Risk:  No flowsheet data found. Coordination of Care:   1. \"Have you been to the ER, urgent care clinic since your last visit? Hospitalized since your last visit? \" Yes 5/1/2023    2. \"Have you seen or consulted any other health care providers outside of the [address] [place] [address] since your last visit? \" No    3. For patients aged 39-70: Has the patient had a colonoscopy / FIT/ Cologuard? Not due    If the patient is female:    4. For patients aged 41-77: Has the patient had a mammogram within the past 2 years? Not due    5. For patients aged 21-65: Has the patient had a pap smear? NA, hysterectomy    Health Maintenance: reviewed and discussed and ordered per Provider.     Health Maintenance Due   Topic Date Due    COVID-19 Vaccine (3 - Booster for Moderna series) 08/08/2021

## 2023-05-12 LAB — HEMOCCULT STL QL IA: NEGATIVE

## 2023-05-26 ENCOUNTER — OFFICE VISIT (OUTPATIENT)
Facility: CLINIC | Age: 48
End: 2023-05-26
Payer: MEDICAID

## 2023-05-26 VITALS
RESPIRATION RATE: 16 BRPM | BODY MASS INDEX: 33.01 KG/M2 | OXYGEN SATURATION: 99 % | SYSTOLIC BLOOD PRESSURE: 118 MMHG | HEART RATE: 63 BPM | WEIGHT: 179.4 LBS | TEMPERATURE: 97 F | DIASTOLIC BLOOD PRESSURE: 72 MMHG | HEIGHT: 62 IN

## 2023-05-26 DIAGNOSIS — Z90.49 S/P CHOLECYSTECTOMY: Primary | ICD-10-CM

## 2023-05-26 DIAGNOSIS — R05.2 SUBACUTE COUGH: ICD-10-CM

## 2023-05-26 PROCEDURE — 99214 OFFICE O/P EST MOD 30 MIN: CPT | Performed by: STUDENT IN AN ORGANIZED HEALTH CARE EDUCATION/TRAINING PROGRAM

## 2023-05-26 RX ORDER — BENZONATATE 100 MG/1
100 CAPSULE ORAL 3 TIMES DAILY PRN
Qty: 30 CAPSULE | Refills: 1 | Status: SHIPPED | OUTPATIENT
Start: 2023-05-26 | End: 2023-06-05

## 2023-05-26 RX ORDER — OXYCODONE HYDROCHLORIDE AND ACETAMINOPHEN 5; 325 MG/1; MG/1
1 TABLET ORAL EVERY 8 HOURS PRN
Qty: 9 TABLET | Refills: 0 | Status: SHIPPED | OUTPATIENT
Start: 2023-05-26 | End: 2023-05-29

## 2023-05-26 RX ORDER — OXYCODONE HYDROCHLORIDE 5 MG/1
5 TABLET ORAL EVERY 4 HOURS PRN
COMMUNITY
Start: 2023-05-19

## 2023-05-26 SDOH — ECONOMIC STABILITY: FOOD INSECURITY: WITHIN THE PAST 12 MONTHS, YOU WORRIED THAT YOUR FOOD WOULD RUN OUT BEFORE YOU GOT MONEY TO BUY MORE.: PATIENT DECLINED

## 2023-05-26 SDOH — ECONOMIC STABILITY: FOOD INSECURITY: WITHIN THE PAST 12 MONTHS, THE FOOD YOU BOUGHT JUST DIDN'T LAST AND YOU DIDN'T HAVE MONEY TO GET MORE.: PATIENT DECLINED

## 2023-05-26 SDOH — ECONOMIC STABILITY: HOUSING INSECURITY
IN THE LAST 12 MONTHS, WAS THERE A TIME WHEN YOU DID NOT HAVE A STEADY PLACE TO SLEEP OR SLEPT IN A SHELTER (INCLUDING NOW)?: PATIENT REFUSED

## 2023-05-26 SDOH — ECONOMIC STABILITY: INCOME INSECURITY: HOW HARD IS IT FOR YOU TO PAY FOR THE VERY BASICS LIKE FOOD, HOUSING, MEDICAL CARE, AND HEATING?: PATIENT DECLINED

## 2023-05-26 ASSESSMENT — ENCOUNTER SYMPTOMS
EYE REDNESS: 0
COLOR CHANGE: 0
BACK PAIN: 0
ABDOMINAL PAIN: 0
EYE ITCHING: 0
EYE DISCHARGE: 0
EYE PAIN: 0
CONSTIPATION: 0
VOMITING: 0
DIARRHEA: 0
FACIAL SWELLING: 0
SHORTNESS OF BREATH: 0

## 2023-05-26 ASSESSMENT — PATIENT HEALTH QUESTIONNAIRE - PHQ9
10. IF YOU CHECKED OFF ANY PROBLEMS, HOW DIFFICULT HAVE THESE PROBLEMS MADE IT FOR YOU TO DO YOUR WORK, TAKE CARE OF THINGS AT HOME, OR GET ALONG WITH OTHER PEOPLE: 0
2. FEELING DOWN, DEPRESSED OR HOPELESS: 0
5. POOR APPETITE OR OVEREATING: 0
SUM OF ALL RESPONSES TO PHQ9 QUESTIONS 1 & 2: 0
4. FEELING TIRED OR HAVING LITTLE ENERGY: 0
9. THOUGHTS THAT YOU WOULD BE BETTER OFF DEAD, OR OF HURTING YOURSELF: 0
6. FEELING BAD ABOUT YOURSELF - OR THAT YOU ARE A FAILURE OR HAVE LET YOURSELF OR YOUR FAMILY DOWN: 0
SUM OF ALL RESPONSES TO PHQ QUESTIONS 1-9: 0
8. MOVING OR SPEAKING SO SLOWLY THAT OTHER PEOPLE COULD HAVE NOTICED. OR THE OPPOSITE, BEING SO FIGETY OR RESTLESS THAT YOU HAVE BEEN MOVING AROUND A LOT MORE THAN USUAL: 0
SUM OF ALL RESPONSES TO PHQ QUESTIONS 1-9: 0
SUM OF ALL RESPONSES TO PHQ QUESTIONS 1-9: 0
7. TROUBLE CONCENTRATING ON THINGS, SUCH AS READING THE NEWSPAPER OR WATCHING TELEVISION: 0
SUM OF ALL RESPONSES TO PHQ QUESTIONS 1-9: 0
1. LITTLE INTEREST OR PLEASURE IN DOING THINGS: 0
3. TROUBLE FALLING OR STAYING ASLEEP: 0

## 2023-05-26 NOTE — PROGRESS NOTES
Codi Tinoco is a 50 y.o.  female and presents with    Chief Complaint   Patient presents with    Cough     Since last week     Abdominal Pain     6/10 RUQ cholecystectomy last week, check suture stitches. Subjective:    Was admitted in the hospital  and was discharged on . Presented d/t abd pain and vomiting. She was dx gallstone pancreatitis, on  had laparoscopic cholecystectomy w/ cholangiogram.     Started coughing after the surgery. She started with a light cough, prior to the surgery, and after the surgery she felt like she had a cold in her chest. Went through 2 bottles of robitussin. Has been taking roxycodone for pain management but feels like this is too strong for her. Patient Active Problem List   Diagnosis    S/P laparoscopic assisted vaginal hysterectomy (LAVH)    Depression    Anemia      Past Medical History:   Diagnosis Date    Anemia 10/13/2014    Depression     Fibroids 2016    H/O excision of dermoid cyst 2016    HTN (hypertension)       Past Surgical History:   Procedure Laterality Date     SECTION  ,     CYST REMOVAL      right ovary    HYSTERECTOMY (CERVIX STATUS UNKNOWN)      due to heavy menses and uterine fibroids.      OVARY REMOVAL Right 2013    TUBAL LIGATION  2001      Family History   Problem Relation Age of Onset    Diabetes Maternal Grandmother     Cancer Father         renal cancer    Hypertension Father     Heart Attack Mother     Heart Disease Mother     Diabetes Mother     Heart Disease Father      Social History     Socioeconomic History    Marital status: Single     Spouse name: Not on file    Number of children: Not on file    Years of education: Not on file    Highest education level: Not on file   Occupational History    Not on file   Tobacco Use    Smoking status: Never    Smokeless tobacco: Never   Substance and Sexual Activity    Alcohol use: Yes    Drug use: No    Sexual

## 2023-05-26 NOTE — PROGRESS NOTES
Rocio Russo is a 50 y.o. presents today for   Chief Complaint   Patient presents with    Cough     Since last week     Abdominal Pain     RUQ cholecystectomy last week, check suture stitches. Is someone accompanying this pt? No     Is the patient using any DME equipment during OV? No     Health Maintenance Due   Topic Date Due    COVID-19 Vaccine (3 - Booster for Moderna series) 08/08/2021         Coordination of Care:   1. \"Have you been to the ER, urgent care clinic since your last visit? Hospitalized since your last visit? \" Yes [facility] for abdominal pain last week     2. \"Have you seen or consulted any other health care providers outside of the [address] [place] [address] since your last visit? \" No     3. For patients aged 39-70: Has the patient had a colonoscopy / FIT/ Cologuard? Yes - no Care Gap present      If the patient is female:    4. For patients aged 41-77: Has the patient had a mammogram within the past 2 years? Yes - no Care Gap present      5. For patients aged 21-65: Has the patient had a pap smear?  Yes - no Care Gap present    Dionna Crespo

## 2023-06-21 ENCOUNTER — TELEPHONE (OUTPATIENT)
Age: 48
End: 2023-06-21

## 2023-06-29 ENCOUNTER — OFFICE VISIT (OUTPATIENT)
Facility: CLINIC | Age: 48
End: 2023-06-29
Payer: MEDICAID

## 2023-06-29 VITALS
SYSTOLIC BLOOD PRESSURE: 119 MMHG | WEIGHT: 184.8 LBS | HEART RATE: 63 BPM | HEIGHT: 62 IN | RESPIRATION RATE: 16 BRPM | BODY MASS INDEX: 34.01 KG/M2 | OXYGEN SATURATION: 100 % | DIASTOLIC BLOOD PRESSURE: 79 MMHG | TEMPERATURE: 97.4 F

## 2023-06-29 DIAGNOSIS — Z12.31 ENCOUNTER FOR SCREENING MAMMOGRAM FOR MALIGNANT NEOPLASM OF BREAST: ICD-10-CM

## 2023-06-29 DIAGNOSIS — I10 ESSENTIAL (PRIMARY) HYPERTENSION: ICD-10-CM

## 2023-06-29 DIAGNOSIS — S52.001D CLOSED FRACTURE OF PROXIMAL END OF RIGHT ULNA WITH ROUTINE HEALING, UNSPECIFIED FRACTURE MORPHOLOGY, SUBSEQUENT ENCOUNTER: ICD-10-CM

## 2023-06-29 DIAGNOSIS — E89.40 POSTSURGICAL MENOPAUSE: Primary | ICD-10-CM

## 2023-06-29 PROCEDURE — 99214 OFFICE O/P EST MOD 30 MIN: CPT | Performed by: STUDENT IN AN ORGANIZED HEALTH CARE EDUCATION/TRAINING PROGRAM

## 2023-06-29 PROCEDURE — 3074F SYST BP LT 130 MM HG: CPT | Performed by: STUDENT IN AN ORGANIZED HEALTH CARE EDUCATION/TRAINING PROGRAM

## 2023-06-29 PROCEDURE — 3078F DIAST BP <80 MM HG: CPT | Performed by: STUDENT IN AN ORGANIZED HEALTH CARE EDUCATION/TRAINING PROGRAM

## 2023-06-29 RX ORDER — AMLODIPINE BESYLATE 2.5 MG/1
2.5 TABLET ORAL DAILY
Qty: 90 TABLET | Refills: 1 | Status: SHIPPED | OUTPATIENT
Start: 2023-06-29

## 2023-06-29 ASSESSMENT — PATIENT HEALTH QUESTIONNAIRE - PHQ9
1. LITTLE INTEREST OR PLEASURE IN DOING THINGS: 0
8. MOVING OR SPEAKING SO SLOWLY THAT OTHER PEOPLE COULD HAVE NOTICED. OR THE OPPOSITE, BEING SO FIGETY OR RESTLESS THAT YOU HAVE BEEN MOVING AROUND A LOT MORE THAN USUAL: 0
SUM OF ALL RESPONSES TO PHQ9 QUESTIONS 1 & 2: 0
SUM OF ALL RESPONSES TO PHQ QUESTIONS 1-9: 0
2. FEELING DOWN, DEPRESSED OR HOPELESS: 0
10. IF YOU CHECKED OFF ANY PROBLEMS, HOW DIFFICULT HAVE THESE PROBLEMS MADE IT FOR YOU TO DO YOUR WORK, TAKE CARE OF THINGS AT HOME, OR GET ALONG WITH OTHER PEOPLE: 0
SUM OF ALL RESPONSES TO PHQ QUESTIONS 1-9: 0
4. FEELING TIRED OR HAVING LITTLE ENERGY: 0
SUM OF ALL RESPONSES TO PHQ QUESTIONS 1-9: 0
6. FEELING BAD ABOUT YOURSELF - OR THAT YOU ARE A FAILURE OR HAVE LET YOURSELF OR YOUR FAMILY DOWN: 0
7. TROUBLE CONCENTRATING ON THINGS, SUCH AS READING THE NEWSPAPER OR WATCHING TELEVISION: 0
9. THOUGHTS THAT YOU WOULD BE BETTER OFF DEAD, OR OF HURTING YOURSELF: 0
3. TROUBLE FALLING OR STAYING ASLEEP: 0
SUM OF ALL RESPONSES TO PHQ QUESTIONS 1-9: 0
5. POOR APPETITE OR OVEREATING: 0

## 2023-06-29 ASSESSMENT — ENCOUNTER SYMPTOMS
DIARRHEA: 0
FACIAL SWELLING: 0
EYE ITCHING: 0
EYE PAIN: 0
SHORTNESS OF BREATH: 0
ABDOMINAL PAIN: 0
CONSTIPATION: 0
BACK PAIN: 0
EYE REDNESS: 0
EYE DISCHARGE: 0
COLOR CHANGE: 0
VOMITING: 0

## 2023-07-06 ENCOUNTER — OFFICE VISIT (OUTPATIENT)
Age: 48
End: 2023-07-06
Payer: MEDICAID

## 2023-07-06 VITALS — WEIGHT: 185 LBS | BODY MASS INDEX: 32.78 KG/M2 | HEIGHT: 63 IN

## 2023-07-06 DIAGNOSIS — S52.001D CLOSED FRACTURE OF PROXIMAL END OF RIGHT ULNA WITH ROUTINE HEALING, UNSPECIFIED FRACTURE MORPHOLOGY, SUBSEQUENT ENCOUNTER: Primary | ICD-10-CM

## 2023-07-06 PROCEDURE — 73080 X-RAY EXAM OF ELBOW: CPT | Performed by: PHYSICIAN ASSISTANT

## 2023-07-06 PROCEDURE — 99024 POSTOP FOLLOW-UP VISIT: CPT | Performed by: PHYSICIAN ASSISTANT

## 2023-07-06 NOTE — PROGRESS NOTES
patient exhibits no evidence of ataxia. Patient is able to ambulate with caution. No focal neurological deficit noted. No facial droop, slurred speech, or evidence of altered mentation noted on exam.   Skin: Skin over the head, neck, bilateral limbs, and trunk is warm and dry. No rash or erythema noted. Cranial Nerves II-XII grossly intact  HENT: NC/AT. Normal symmetry, bulk and tone of facial and neck musculature. Trachea midline. No discernible thyromegaly or masses. No involuntary movements. Lymphatic: No preauricular, submandibuar, anterior or posterior cervical lymphadenopathy. Psychiatric: The patient is awake, alert, and oriented to person, place and time. Behavior is normal. Thought content normal.   Cardiovascular: No clubbing, cyanosis. No edema bilateral lower extremities. Pulmonary: No tripoding nor accessory muscle recruitment. Breathing normally, no distress, no audible wheezing. Distal cap refill intact at 2/2 Kyree UE / LE. Neuro intact Kyree UE/LE to noxious stimuli      Ortho Specific exam:    Patient found in a sling which was removed right upper extremity. Skin intact right upper extremity no warmth, erythema, edema, or ecchymosis. No tenderness over the proximal head of the right ulna. Pronation supination is painless and noted no restriction in motion. Active range of motion guarded of the elbow today noted at 110 degrees  -patient 0 degrees. Distal sensation intact fully right upper extremity. X-ray: Historical images from [facility] 7[facility]6/2023 reveal avulsion fracture noted proximal ulna right elbow with no other fracture deformities noted, there is interval improvement in the fracture when compared to prior imaging. .  No lytic or blastic lesions. No soft tissue ossifications.           IMPRESSION:      ICD-10-CM    1. Closed fracture of proximal end of right ulna with routine healing, unspecified fracture morphology, subsequent encounter  S52.001D [[id number]] Elbow 3V

## 2023-07-20 ENCOUNTER — HOSPITAL ENCOUNTER (OUTPATIENT)
Dept: WOMENS IMAGING | Facility: HOSPITAL | Age: 48
Discharge: HOME | End: 2023-07-20
Attending: STUDENT IN AN ORGANIZED HEALTH CARE EDUCATION/TRAINING PROGRAM
Payer: MEDICAID

## 2023-07-20 ENCOUNTER — HOSPITAL ENCOUNTER (OUTPATIENT)
Facility: HOSPITAL | Age: 48
End: 2023-07-20
Attending: STUDENT IN AN ORGANIZED HEALTH CARE EDUCATION/TRAINING PROGRAM
Payer: MEDICAID

## 2023-07-20 DIAGNOSIS — Z12.31 ENCOUNTER FOR SCREENING MAMMOGRAM FOR MALIGNANT NEOPLASM OF BREAST: ICD-10-CM

## 2023-07-20 DIAGNOSIS — S52.001D CLOSED FRACTURE OF PROXIMAL END OF RIGHT ULNA WITH ROUTINE HEALING, UNSPECIFIED FRACTURE MORPHOLOGY, SUBSEQUENT ENCOUNTER: ICD-10-CM

## 2023-07-20 DIAGNOSIS — E89.40 POSTSURGICAL MENOPAUSE: ICD-10-CM

## 2023-07-20 PROCEDURE — 77080 DXA BONE DENSITY AXIAL: CPT

## 2023-07-20 PROCEDURE — 77067 SCR MAMMO BI INCL CAD: CPT

## 2023-07-31 ENCOUNTER — TELEPHONE (OUTPATIENT)
Facility: CLINIC | Age: 48
End: 2023-07-31

## 2023-07-31 NOTE — TELEPHONE ENCOUNTER
----- Message from Lima Tobin sent at 4/69/4500  8:22 AM EDT -----  Subject: Results Request    QUESTIONS  Results: Dexa Bone Density, Mammogram; Ordered by: Anthony Lorenz   Date Performed: 2023-07-20  ---------------------------------------------------------------------------  --------------  [facility]    [phone]; OK to leave message on voicemail  ---------------------------------------------------------------------------  --------------

## 2024-01-12 ENCOUNTER — TELEPHONE (OUTPATIENT)
Facility: CLINIC | Age: 49
End: 2024-01-12

## 2024-01-12 NOTE — TELEPHONE ENCOUNTER
The patient called and says she is having abdominal pain that is moving towards the R side. She expresses that is feels like it might be her appendix and is requesting to speak with her provider or get seen in office. Pt did express a possibility of going to the ER if she can not get seen. Please follow up with pt via phone call when available.

## 2024-01-17 NOTE — TELEPHONE ENCOUNTER
Called pt. She states she is feeling better. She thinks it could be gas. Since she had gallbladder removed she feels like her abdomen has been off. She will be making an appointment.

## 2024-02-27 ENCOUNTER — OFFICE VISIT (OUTPATIENT)
Facility: CLINIC | Age: 49
End: 2024-02-27
Payer: MEDICAID

## 2024-02-27 VITALS
RESPIRATION RATE: 12 BRPM | BODY MASS INDEX: 35.72 KG/M2 | OXYGEN SATURATION: 98 % | DIASTOLIC BLOOD PRESSURE: 78 MMHG | WEIGHT: 201.6 LBS | SYSTOLIC BLOOD PRESSURE: 135 MMHG | HEART RATE: 67 BPM | HEIGHT: 63 IN | TEMPERATURE: 97.2 F

## 2024-02-27 DIAGNOSIS — R30.0 DYSURIA: Primary | ICD-10-CM

## 2024-02-27 DIAGNOSIS — E55.9 VITAMIN D DEFICIENCY: ICD-10-CM

## 2024-02-27 DIAGNOSIS — I10 ESSENTIAL (PRIMARY) HYPERTENSION: ICD-10-CM

## 2024-02-27 DIAGNOSIS — E66.9 OBESITY, CLASS II, BMI 35-39.9: ICD-10-CM

## 2024-02-27 DIAGNOSIS — Z00.00 ENCOUNTER FOR WELL ADULT EXAM WITHOUT ABNORMAL FINDINGS: ICD-10-CM

## 2024-02-27 LAB
BILIRUBIN, URINE, POC: NEGATIVE
BLOOD URINE, POC: NEGATIVE
GLUCOSE URINE, POC: NEGATIVE
KETONES, URINE, POC: NEGATIVE
LEUKOCYTE ESTERASE, URINE, POC: NEGATIVE
NITRITE, URINE, POC: NEGATIVE
PH, URINE, POC: 7 (ref 4.6–8)
PROTEIN,URINE, POC: NEGATIVE
SPECIFIC GRAVITY, URINE, POC: 1.01 (ref 1–1.03)
URINALYSIS CLARITY, POC: CLEAR
URINALYSIS COLOR, POC: YELLOW
UROBILINOGEN, POC: NORMAL

## 2024-02-27 PROCEDURE — 3078F DIAST BP <80 MM HG: CPT | Performed by: STUDENT IN AN ORGANIZED HEALTH CARE EDUCATION/TRAINING PROGRAM

## 2024-02-27 PROCEDURE — 3075F SYST BP GE 130 - 139MM HG: CPT | Performed by: STUDENT IN AN ORGANIZED HEALTH CARE EDUCATION/TRAINING PROGRAM

## 2024-02-27 PROCEDURE — 81003 URINALYSIS AUTO W/O SCOPE: CPT | Performed by: STUDENT IN AN ORGANIZED HEALTH CARE EDUCATION/TRAINING PROGRAM

## 2024-02-27 PROCEDURE — 99214 OFFICE O/P EST MOD 30 MIN: CPT | Performed by: STUDENT IN AN ORGANIZED HEALTH CARE EDUCATION/TRAINING PROGRAM

## 2024-02-27 RX ORDER — AMLODIPINE BESYLATE 2.5 MG/1
2.5 TABLET ORAL DAILY
Qty: 90 TABLET | Refills: 1 | Status: SHIPPED | OUTPATIENT
Start: 2024-02-27

## 2024-02-27 ASSESSMENT — PATIENT HEALTH QUESTIONNAIRE - PHQ9
SUM OF ALL RESPONSES TO PHQ QUESTIONS 1-9: 0
7. TROUBLE CONCENTRATING ON THINGS, SUCH AS READING THE NEWSPAPER OR WATCHING TELEVISION: 0
SUM OF ALL RESPONSES TO PHQ QUESTIONS 1-9: 0
8. MOVING OR SPEAKING SO SLOWLY THAT OTHER PEOPLE COULD HAVE NOTICED. OR THE OPPOSITE, BEING SO FIGETY OR RESTLESS THAT YOU HAVE BEEN MOVING AROUND A LOT MORE THAN USUAL: 0
5. POOR APPETITE OR OVEREATING: 0
6. FEELING BAD ABOUT YOURSELF - OR THAT YOU ARE A FAILURE OR HAVE LET YOURSELF OR YOUR FAMILY DOWN: 0
10. IF YOU CHECKED OFF ANY PROBLEMS, HOW DIFFICULT HAVE THESE PROBLEMS MADE IT FOR YOU TO DO YOUR WORK, TAKE CARE OF THINGS AT HOME, OR GET ALONG WITH OTHER PEOPLE: 0
SUM OF ALL RESPONSES TO PHQ QUESTIONS 1-9: 0
3. TROUBLE FALLING OR STAYING ASLEEP: 0
SUM OF ALL RESPONSES TO PHQ9 QUESTIONS 1 & 2: 0
1. LITTLE INTEREST OR PLEASURE IN DOING THINGS: 0
2. FEELING DOWN, DEPRESSED OR HOPELESS: 0
9. THOUGHTS THAT YOU WOULD BE BETTER OFF DEAD, OR OF HURTING YOURSELF: 0
SUM OF ALL RESPONSES TO PHQ QUESTIONS 1-9: 0
4. FEELING TIRED OR HAVING LITTLE ENERGY: 0

## 2024-02-27 ASSESSMENT — VISUAL ACUITY
OD_CC: 20/13
OS_CC: 20/13

## 2024-02-27 ASSESSMENT — ENCOUNTER SYMPTOMS
EYE PAIN: 0
DIARRHEA: 0
EYE ITCHING: 0
COLOR CHANGE: 0
BACK PAIN: 0
EYE REDNESS: 0
EYE DISCHARGE: 0
FACIAL SWELLING: 0
SHORTNESS OF BREATH: 0
VOMITING: 0
CONSTIPATION: 0
ABDOMINAL PAIN: 0

## 2024-02-27 NOTE — PATIENT INSTRUCTIONS
rest. Being active helps you lose fat and build lean muscle.  Try to be active for at least 1 hour every day. This may sound like a lot, but it's okay to be active in smaller blocks of time that add up to 1 hour a day. Any activity that makes your heart beat faster and keeps it there for a while counts. A brisk walk, run, or swim will get your heart beating faster. So will climbing stairs, shooting baskets, or cycling. Even some household chores like vacuuming and mowing the lawn will get your heart rate up.  Pick activities that you enjoy--ones that make your heart beat faster, your muscles stronger, and your muscles and joints more flexible. If you find more than one thing you like doing, do them all. You don't have to do the same thing every day.  Don't diet  Diets don't work.  Diets are temporary. Because you give up so much when you diet, you may be hungry and think about food all the time. And after you stop dieting, you also may overeat to make up for what you missed. Most people who diet end up gaining back the pounds they lost--and more.  Remember that healthy bodies come in lots of shapes and sizes. Everyone can get healthier by eating better and being more active.  Where can you learn more?  Go to https://www.HST[employer].net/patientEd and enter B909 to learn more about \"Learning About Healthy Weight.\"  Current as of: September 20, 2023               Content Version: 13.9  © 4156-2214 [employer].   Care instructions adapted under license by [employer]. If you have questions about a medical condition or this instruction, always ask your healthcare professional. [employer] disclaims any warranty or liability for your use of this information.

## 2024-02-27 NOTE — PROGRESS NOTES
Nurys Santana is a 49 y.o. year old female who presents today for   Chief Complaint   Patient presents with    Annual Exam       Is someone accompanying this pt? No     Is the patient using any DME equipment during OV? No     Depression Screenin/27/2024     8:20 AM 2023     8:03 AM 2023     9:41 AM 2022     9:06 AM 2022     8:06 AM 2022     1:57 PM 10/10/2022    10:30 AM   PHQ-9 Questionaire   Little interest or pleasure in doing things 0 0 0 0 0 0 0   Feeling down, depressed, or hopeless 0 0 0 0 0 3 0   Trouble falling or staying asleep, or sleeping too much 0 0 0   3    Feeling tired or having little energy 0 0 0   1    Poor appetite or overeating 0 0 0   1    Feeling bad about yourself - or that you are a failure or have let yourself or your family down 0 0 0   1    Trouble concentrating on things, such as reading the newspaper or watching television 0 0 0   1    Moving or speaking so slowly that other people could have noticed. Or the opposite - being so fidgety or restless that you have been moving around a lot more than usual 0 0 0   0    Thoughts that you would be better off dead, or of hurting yourself in some way 0 0 0       PHQ-9 Total Score 0 0 0 0 0 10 0   If you checked off any problems, how difficult have these problems made it for you to do your work, take care of things at home, or get along with other people? 0 0 0           Abuse Screenin/27/2024     8:00 AM 2023     3:00 PM   AMB Abuse Screening   Do you ever feel afraid of your partner? N N   Are you in a relationship with someone who physically or mentally threatens you? N N   Is it safe for you to go home? Y Y       Learning Assessment:  No question data found.    Fall Risk:       No data to display                    Coordination of Care:   1. \"Have you been to the ER, urgent care clinic since your last visit?  Hospitalized since your last visit?\" No     2. \"Have you seen or consulted any 
were answered concerning future plans.  I have discussed medication side effects and warnings with the patient as well. I have reviewed the plan of care with the patient, accepted their input and they are in agreement with the treatment goals.     Muna Orellana MD

## 2024-02-28 LAB
A/G RATIO: 1.6 RATIO (ref 1.1–2.6)
ALBUMIN SERPL-MCNC: 4.5 G/DL (ref 3.5–5)
ALP BLD-CCNC: 113 U/L (ref 25–115)
ALT SERPL-CCNC: 30 U/L (ref 5–40)
ANION GAP SERPL CALCULATED.3IONS-SCNC: 11 MMOL/L (ref 3–15)
AST SERPL-CCNC: 20 U/L (ref 10–37)
BILIRUB SERPL-MCNC: 0.1 MG/DL (ref 0.2–1.2)
BILIRUB SERPL-MCNC: NEGATIVE MG/DL
BLOOD: NEGATIVE
BUN BLDV-MCNC: 8 MG/DL (ref 6–22)
CALCIUM SERPL-MCNC: 9.3 MG/DL (ref 8.4–10.5)
CHLORIDE BLD-SCNC: 103 MMOL/L (ref 98–110)
CHOLESTEROL/HDL RATIO: 3.7 (ref 0–5)
CHOLESTEROL: 190 MG/DL (ref 110–200)
CLARITY: CLEAR
CO2: 25 MMOL/L (ref 20–32)
COLOR: YELLOW
CREAT SERPL-MCNC: 0.8 MG/DL (ref 0.5–1.2)
ESTIMATED AVERAGE GLUCOSE: 119 MG/DL (ref 91–123)
GLOBULIN: 2.9 G/DL (ref 2–4)
GLOMERULAR FILTRATION RATE: >60 ML/MIN/1.73 SQ.M.
GLUCOSE: 105 MG/DL (ref 70–99)
GLUCOSE: NEGATIVE MG/DL
HBA1C MFR BLD: 5.8 % (ref 4.8–5.6)
HCT VFR BLD CALC: 42.7 % (ref 35.1–48)
HDLC SERPL-MCNC: 51 MG/DL
HEMOGLOBIN: 13.4 G/DL (ref 11.7–16)
KETONES, URINE: NEGATIVE MG/DL
LDL CHOLESTEROL CALCULATED: 124 MG/DL (ref 50–99)
LDL/HDL RATIO: 2.4
LEUKOCYTE ESTERASE, URINE: NEGATIVE
MCH RBC QN AUTO: 30 PG (ref 26–34)
MCHC RBC AUTO-ENTMCNC: 31 G/DL (ref 31–36)
MCV RBC AUTO: 94 FL (ref 80–99)
NITRITE, URINE: NEGATIVE
NON-HDL CHOLESTEROL: 139 MG/DL
PDW BLD-RTO: 13.7 % (ref 10–15.5)
PH, URINE: 6.5 PH (ref 5–8)
PLATELET # BLD: 329 K/UL (ref 140–440)
PMV BLD AUTO: 10.8 FL (ref 9–13)
POTASSIUM SERPL-SCNC: 4.5 MMOL/L (ref 3.5–5.5)
PROTEIN UA: NEGATIVE MG/DL
RBC: 4.53 M/UL (ref 3.8–5.2)
SODIUM BLD-SCNC: 139 MMOL/L (ref 133–145)
SPECIFIC GRAVITY: 1 (ref 1–1.03)
TOTAL PROTEIN: 7.4 G/DL (ref 6.4–8.3)
TRIGL SERPL-MCNC: 71 MG/DL (ref 40–149)
TSH SERPL DL<=0.05 MIU/L-ACNC: 0.46 MCU/ML (ref 0.27–4.2)
UROBILINOGEN: 0.2 MG/DL
VITAMIN D 25-HYDROXY: 34.6 NG/ML (ref 32–100)
VLDLC SERPL CALC-MCNC: 14 MG/DL (ref 8–30)
WBC: 5.3 K/UL (ref 4–11)

## 2024-05-28 ENCOUNTER — OFFICE VISIT (OUTPATIENT)
Facility: CLINIC | Age: 49
End: 2024-05-28
Payer: COMMERCIAL

## 2024-05-28 VITALS
BODY MASS INDEX: 34.48 KG/M2 | DIASTOLIC BLOOD PRESSURE: 77 MMHG | TEMPERATURE: 97.2 F | RESPIRATION RATE: 12 BRPM | OXYGEN SATURATION: 99 % | HEIGHT: 63 IN | SYSTOLIC BLOOD PRESSURE: 124 MMHG | HEART RATE: 69 BPM | WEIGHT: 194.6 LBS

## 2024-05-28 DIAGNOSIS — M79.18 MYOFASCIAL MUSCLE PAIN: ICD-10-CM

## 2024-05-28 DIAGNOSIS — I10 ESSENTIAL (PRIMARY) HYPERTENSION: Primary | ICD-10-CM

## 2024-05-28 PROCEDURE — 20552 NJX 1/MLT TRIGGER POINT 1/2: CPT | Performed by: STUDENT IN AN ORGANIZED HEALTH CARE EDUCATION/TRAINING PROGRAM

## 2024-05-28 PROCEDURE — 3078F DIAST BP <80 MM HG: CPT | Performed by: STUDENT IN AN ORGANIZED HEALTH CARE EDUCATION/TRAINING PROGRAM

## 2024-05-28 PROCEDURE — 99214 OFFICE O/P EST MOD 30 MIN: CPT | Performed by: STUDENT IN AN ORGANIZED HEALTH CARE EDUCATION/TRAINING PROGRAM

## 2024-05-28 PROCEDURE — 3074F SYST BP LT 130 MM HG: CPT | Performed by: STUDENT IN AN ORGANIZED HEALTH CARE EDUCATION/TRAINING PROGRAM

## 2024-05-28 RX ORDER — TIZANIDINE 4 MG/1
4 TABLET ORAL 3 TIMES DAILY PRN
Qty: 30 TABLET | Refills: 0 | Status: SHIPPED | OUTPATIENT
Start: 2024-05-28

## 2024-05-28 SDOH — ECONOMIC STABILITY: HOUSING INSECURITY
IN THE LAST 12 MONTHS, WAS THERE A TIME WHEN YOU DID NOT HAVE A STEADY PLACE TO SLEEP OR SLEPT IN A SHELTER (INCLUDING NOW)?: NO

## 2024-05-28 SDOH — ECONOMIC STABILITY: FOOD INSECURITY: WITHIN THE PAST 12 MONTHS, THE FOOD YOU BOUGHT JUST DIDN'T LAST AND YOU DIDN'T HAVE MONEY TO GET MORE.: NEVER TRUE

## 2024-05-28 SDOH — ECONOMIC STABILITY: FOOD INSECURITY: WITHIN THE PAST 12 MONTHS, YOU WORRIED THAT YOUR FOOD WOULD RUN OUT BEFORE YOU GOT MONEY TO BUY MORE.: NEVER TRUE

## 2024-05-28 SDOH — ECONOMIC STABILITY: INCOME INSECURITY: HOW HARD IS IT FOR YOU TO PAY FOR THE VERY BASICS LIKE FOOD, HOUSING, MEDICAL CARE, AND HEATING?: NOT HARD AT ALL

## 2024-05-28 ASSESSMENT — ENCOUNTER SYMPTOMS
EYE ITCHING: 0
EYE REDNESS: 0
EYE PAIN: 0
DIARRHEA: 0
BACK PAIN: 0
SHORTNESS OF BREATH: 0
COLOR CHANGE: 0
ABDOMINAL PAIN: 0
FACIAL SWELLING: 0
EYE DISCHARGE: 0
VOMITING: 0
CONSTIPATION: 0

## 2024-05-28 NOTE — PROGRESS NOTES
Nurys Santana is a 49 y.o. year old female who presents today for   Chief Complaint   Patient presents with    Hypertension    Weight Management       Is someone accompanying this pt? No     Is the patient using any DME equipment during OV? No     Depression Screenin/27/2024     8:20 AM 2023     8:03 AM 2023     9:41 AM 2022     9:06 AM 2022     8:06 AM 2022     1:57 PM 10/10/2022    10:30 AM   PHQ-9 Questionaire   Little interest or pleasure in doing things 0 0 0 0 0 0 0   Feeling down, depressed, or hopeless 0 0 0 0 0 3 0   Trouble falling or staying asleep, or sleeping too much 0 0 0   3    Feeling tired or having little energy 0 0 0   1    Poor appetite or overeating 0 0 0   1    Feeling bad about yourself - or that you are a failure or have let yourself or your family down 0 0 0   1    Trouble concentrating on things, such as reading the newspaper or watching television 0 0 0   1    Moving or speaking so slowly that other people could have noticed. Or the opposite - being so fidgety or restless that you have been moving around a lot more than usual 0 0 0   0    Thoughts that you would be better off dead, or of hurting yourself in some way 0 0 0       PHQ-9 Total Score 0 0 0 0 0 10 0   If you checked off any problems, how difficult have these problems made it for you to do your work, take care of things at home, or get along with other people? 0 0 0           Abuse Screenin/27/2024     8:00 AM 2023     3:00 PM   AMB Abuse Screening   Do you ever feel afraid of your partner? N N   Are you in a relationship with someone who physically or mentally threatens you? N N   Is it safe for you to go home? Y Y       Learning Assessment:  No question data found.    Fall Risk:       No data to display                    Coordination of Care:   1. \"Have you been to the ER, urgent care clinic since your last visit?  Hospitalized since your last visit?\" No     2. \"Have you seen

## 2024-05-28 NOTE — PROGRESS NOTES
Nurys Santana is a 49 y.o.  female and presents with    Chief Complaint   Patient presents with    Hypertension    Weight Management           Subjective:    For the las couple of weeks she has been having L sided back pain, that it goes her shoulder and neck. She anderson pain upon touching the area or turning towards the L side. She usually sleeps on her L side.     Hypertension follow up:  Taking medications as prescribed: Yes  Checking BP at home: Yes  Symptoms: none  Low sodium diet: No - doing better  Exercise: YES - has been riding a bike.   Has been losing weight by drinking less sodas, and cutting down.    Patient Active Problem List   Diagnosis    S/P laparoscopic assisted vaginal hysterectomy (LAVH)    Depression    Anemia      Past Medical History:   Diagnosis Date    Anemia 10/13/2014    Depression     Fibroids 2016    H/O excision of dermoid cyst 2016    HTN (hypertension)     Hypertension       Past Surgical History:   Procedure Laterality Date     SECTION  ,     CYST REMOVAL      right ovary    HYSTERECTOMY (CERVIX STATUS UNKNOWN)      due to heavy menses and uterine fibroids.     OVARY REMOVAL Right 2013    TUBAL LIGATION  2001      Family History   Problem Relation Age of Onset    Diabetes Maternal Grandmother     Cancer Father         renal cancer    Hypertension Father     Heart Disease Father     Heart Attack Mother     Heart Disease Mother     Diabetes Mother      Social History     Socioeconomic History    Marital status: Single     Spouse name: Not on file    Number of children: Not on file    Years of education: Not on file    Highest education level: Not on file   Occupational History    Not on file   Tobacco Use    Smoking status: Never    Smokeless tobacco: Never   Substance and Sexual Activity    Alcohol use: Not Currently    Drug use: No    Sexual activity: Yes     Partners: Male     Birth control/protection: Surgical, None

## 2024-06-12 SDOH — HEALTH STABILITY: PHYSICAL HEALTH: ON AVERAGE, HOW MANY MINUTES DO YOU ENGAGE IN EXERCISE AT THIS LEVEL?: 30 MIN

## 2024-06-12 SDOH — HEALTH STABILITY: PHYSICAL HEALTH: ON AVERAGE, HOW MANY DAYS PER WEEK DO YOU ENGAGE IN MODERATE TO STRENUOUS EXERCISE (LIKE A BRISK WALK)?: 3 DAYS

## 2024-06-13 ENCOUNTER — OFFICE VISIT (OUTPATIENT)
Age: 49
End: 2024-06-13

## 2024-06-13 VITALS — BODY MASS INDEX: 34.47 KG/M2 | RESPIRATION RATE: 16 BRPM | HEIGHT: 63 IN

## 2024-06-13 DIAGNOSIS — M25.812 IMPINGEMENT OF LEFT SHOULDER: ICD-10-CM

## 2024-06-13 DIAGNOSIS — M75.52 SUBACROMIAL BURSITIS OF LEFT SHOULDER JOINT: ICD-10-CM

## 2024-06-13 DIAGNOSIS — M25.512 ACUTE PAIN OF LEFT SHOULDER: Primary | ICD-10-CM

## 2024-06-13 DIAGNOSIS — M25.612 DECREASED RANGE OF MOTION OF LEFT SHOULDER: ICD-10-CM

## 2024-06-13 DIAGNOSIS — M54.6 ACUTE LEFT-SIDED THORACIC BACK PAIN: Primary | ICD-10-CM

## 2024-06-13 RX ORDER — TRIAMCINOLONE ACETONIDE 40 MG/ML
40 INJECTION, SUSPENSION INTRA-ARTICULAR; INTRAMUSCULAR ONCE
Status: COMPLETED | OUTPATIENT
Start: 2024-06-13 | End: 2024-06-13

## 2024-06-13 RX ORDER — DICLOFENAC SODIUM 75 MG/1
75 TABLET, DELAYED RELEASE ORAL 2 TIMES DAILY
Qty: 60 TABLET | Refills: 3 | Status: SHIPPED | OUTPATIENT
Start: 2024-06-13

## 2024-06-13 RX ORDER — BUPIVACAINE HYDROCHLORIDE 5 MG/ML
7 INJECTION, SOLUTION PERINEURAL ONCE
Status: COMPLETED | OUTPATIENT
Start: 2024-06-13 | End: 2024-06-13

## 2024-06-13 RX ADMIN — BUPIVACAINE HYDROCHLORIDE 35 MG: 5 INJECTION, SOLUTION PERINEURAL at 14:33

## 2024-06-13 RX ADMIN — TRIAMCINOLONE ACETONIDE 40 MG: 40 INJECTION, SUSPENSION INTRA-ARTICULAR; INTRAMUSCULAR at 14:34

## 2024-06-13 NOTE — PROGRESS NOTES
include Y reveals no acute fracture deformity lesions or masses noted.  No soft tissue ossifications.    IMPRESSION:      ICD-10-CM    1. Acute pain of left shoulder  M25.512 AMB POC XRAY, SHOULDER; COMPLETE, 2+     BS - [facility]     DRAIN/INJECT LARGE JOINT/BURSA     triamcinolone acetonide (KENALOG-40) injection 40 mg     BUPivacaine (MARCAINE) 0.5 % injection 35 mg      2. Decreased range of motion of left shoulder  M25.612       3. Subacromial bursitis of left shoulder joint  M75.52       4. Impingement of left shoulder  M25.812            PLAN:  Currently recommending a low-dose cortisone injection for left shoulder subacromial bursitis/tendinitis.  Patient will take off the remainder of the week.  She was also given a prescription for physical therapy outpatient as well as diclofenac 75 mg enteric-coated tablets to use twice daily.  Will plan on seeing her back in about 3 weeks.  Consideration for MRI if she fails to improve and/or worsens from current therapies.          Special note: Medication management discussed and patient will begin the following per recommended dosing.    Voltaren 75 mg tablets 1 p.o. twice daily    Discontinue Flexeril  Okay to use Zanaflex as prescribed by PCP  (Yes) Physical therapy ordered for range of motion all modalities, stretching, range of motion of specific functional group associated     with primary treating condition,  gentle strengthening of musculature with attention to increasing endurance, gait training,balance and fine motor coordination.       Special note: I reviewed and agree with the PFSH and the ROS as well as the intake form in the chart in the record.  I have reviewed prior medical record(s) in detail regarding this patient in this care appointment.          Appropriate for outpatient management. Will discuss supportive treatment, NSAIDS, RICE and orthopedic follow-up. Discussed treatment plan, return precautions,

## 2024-06-17 ENCOUNTER — TELEMEDICINE (OUTPATIENT)
Facility: CLINIC | Age: 49
End: 2024-06-17
Payer: COMMERCIAL

## 2024-06-17 DIAGNOSIS — M25.512 ACUTE PAIN OF LEFT SHOULDER: Primary | ICD-10-CM

## 2024-06-17 DIAGNOSIS — Z02.89 ENCOUNTER FOR OTHER ADMINISTRATIVE EXAMINATIONS: ICD-10-CM

## 2024-06-17 PROCEDURE — 99214 OFFICE O/P EST MOD 30 MIN: CPT | Performed by: STUDENT IN AN ORGANIZED HEALTH CARE EDUCATION/TRAINING PROGRAM

## 2024-06-17 ASSESSMENT — ENCOUNTER SYMPTOMS
FACIAL SWELLING: 0
ABDOMINAL PAIN: 0
VOMITING: 0
EYE ITCHING: 0
COLOR CHANGE: 0
EYE DISCHARGE: 0
BACK PAIN: 0
EYE REDNESS: 0
EYE PAIN: 0
DIARRHEA: 0
CONSTIPATION: 0
SHORTNESS OF BREATH: 0

## 2024-06-17 NOTE — PROGRESS NOTES
Housing Stability: Unknown (5/28/2024)    Housing Stability Vital Sign     Unable to Pay for Housing in the Last Year: Not on file     Number of Places Lived in the Last Year: Not on file     Unstable Housing in the Last Year: No        Current Outpatient Medications   Medication Sig Dispense Refill    diclofenac (VOLTAREN) 75 MG EC tablet Take 1 tablet by mouth 2 times daily 60 tablet 3    tiZANidine (ZANAFLEX) 4 MG tablet Take 1 tablet by mouth 3 times daily as needed (muscle spasm) 30 tablet 0    amLODIPine (NORVASC) 2.5 MG tablet Take 1 tablet by mouth daily 90 tablet 1    vitamin D (CHOLECALCIFEROL) 25 MCG (1000 UT) TABS tablet Take by mouth daily       No current facility-administered medications for this visit.        ROS   Review of Systems   Constitutional:  Negative for activity change and appetite change.   HENT:  Negative for congestion, drooling, ear discharge, ear pain and facial swelling.    Eyes:  Negative for pain, discharge, redness and itching.   Respiratory:  Negative for shortness of breath.    Cardiovascular:  Negative for leg swelling.   Gastrointestinal:  Negative for abdominal pain, constipation, diarrhea and vomiting.   Genitourinary:  Negative for difficulty urinating, frequency, hematuria and urgency.   Musculoskeletal:  Positive for arthralgias. Negative for back pain, myalgias and neck pain.   Skin:  Negative for color change.   Neurological:  Negative for dizziness, seizures, numbness and headaches.   Psychiatric/Behavioral:  Negative for agitation, behavioral problems, decreased concentration, sleep disturbance and suicidal ideas.              Objective:  There were no vitals filed for this visit.      Physical Exam  Pulmonary:      Effort: Pulmonary effort is normal.   Musculoskeletal:      Cervical back: Normal range of motion.   Neurological:      Mental Status: She is alert and oriented to person, place, and time.   Psychiatric:         Mood and Affect: Mood normal.

## 2024-06-21 ENCOUNTER — HOSPITAL ENCOUNTER (OUTPATIENT)
Facility: HOSPITAL | Age: 49
Setting detail: RECURRING SERIES
Discharge: HOME | End: 2024-06-24
Payer: COMMERCIAL

## 2024-06-21 PROCEDURE — 97162 PT EVAL MOD COMPLEX 30 MIN: CPT

## 2024-06-21 PROCEDURE — 97110 THERAPEUTIC EXERCISES: CPT

## 2024-06-21 NOTE — PROGRESS NOTES
PT DAILY TREATMENT NOTE/SHOULDER EVAL     Patient Name: April BRYAN Santana    Date: 2024    : 1975  Insurance: Payor: [employer] / Plan: [employer] INDIVIDUAL [employer] FAMILY [employer] / Product Type: *No Product type* /      Patient  verified yes     Visit #   Current / Total 1 4-8   Time   In / Out 10:23 10:50   Pain   In / Out 4/10 4/10   Subjective Functional Status/Changes: See below       Treatment Area: Left shoulder pain [M25.512]    SUBJECTIVE    Current symptoms/Complaints: Patient reports pain in left shoulder for the past 5 weeks. She reports pain around her left shoulder complex and pain in upper trap region when she turns her head to the left. She received a cortisone injection on 2024 which helped a little. She c/o's of an ache in left shoulder. State left shoulder pain wakes her up ~ 3x/night. She works as a CNA.   Allergies: NKA  PMH: HTN    OBJECTIVE/EXAMINATION    Mobility: [x] No assistive device [] RW [] Rollator [] Hemiwalker [] LBQC [] SBQC [] SPC [] Other:   Self Care: painful ADL's       19 min []Eval                  []Re-Eval       Therapeutic Procedures:  Tx Min Billable or 1:1 Min (if diff from Tx Min) Procedure, Rationale, Specifics   8  39682 Therapeutic Exercise (timed):  increase ROM, strength, coordination, balance, and proprioception to improve patient's ability to progress to PLOF and address remaining functional goals. (see flow sheet as applicable)     Details if applicable:     Total  8 Total Reminder: MC/BC bill using total billable min of TIMED therapeutic procedures (example: do not include dry needle or estim unattended, both untimed codes, in totals to left)     [x]  Patient Education billed concurrently with other procedures     Other Objective/Functional Measures:     Physical Therapy Evaluation - Shoulder    Posture: [] Poor    [] Fair    [x] Good    Describe:    ROM:  [] Unable to assess at this time                                           AROM

## 2024-06-21 NOTE — PROGRESS NOTES
CRISTOBAL LIANG [facility]  [address] [place] [address] Phone: [phone] Fax   Plan of Care / Statement of Necessity for Physical Therapy Services     Patient Name: Nurys Santana : 1975   Medical   Diagnosis: Left shoulder pain [M25.512] Treatment Diagnosis: M25.512  LEFT SHOULDER PAIN     Onset Date: 2024 Payor:  Payor: RONARA / Plan: [employer] / Product Type: *No Product type* /    Referral Source: Aly Grimaldo PA-C Start of Care (SOC): 2024   Prior Hospitalization: See medical history Provider #: [id number]   Prior Level of Function: Independent, working as a CNA   Comorbidities: HTN     Assessment / key information:  Patient is a 49 year old female who presents to physical therapy today with a diagnosis of acute left shoulder pain. Patient states the pain began in 2024. 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 She reports pain with any activity and sleeping.     Objective findings:    ROM:  [] Unable to assess at this time                                    AROM                                             PROM    Left Right   Left Right   Flexion 165 (p! 6/10) 165 Flexion       Extension 60 (p! 6/10) 60 Extension       Scaption/ (no p!) 155 Scaptin/ABD       ER @ 0 Degrees     ER @ 0 Degrees       ER @ 90 Degrees 90 (p! 8/10) 90 ER @ 90 Degrees       IR (HBB) L5 (p! 7/10) T10 IR @ 90 Degrees          End Feel / Painful Arc:     Strength:   [] Unable to assess at this time                                                                             L (1-5) R (1-5) Pain   Flexors 4 5 [] Yes   [] No   Abductors 4 5 [] Yes   [] No   External Rotators 5 5 [x] Yes   [] No   Internal Rotators 5 5 [] Yes   [] No   Supraspinatus     [] Yes   [] No   Serratus Anterior     [] Yes   [] No   Lower

## 2024-07-05 ENCOUNTER — HOSPITAL ENCOUNTER (OUTPATIENT)
Facility: HOSPITAL | Age: 49
Setting detail: RECURRING SERIES
Discharge: HOME | End: 2024-07-08
Payer: COMMERCIAL

## 2024-07-05 PROCEDURE — 97110 THERAPEUTIC EXERCISES: CPT

## 2024-07-05 PROCEDURE — 97530 THERAPEUTIC ACTIVITIES: CPT

## 2024-07-05 PROCEDURE — 97112 NEUROMUSCULAR REEDUCATION: CPT

## 2024-07-05 PROCEDURE — 97535 SELF CARE MNGMENT TRAINING: CPT

## 2024-07-05 NOTE — PROGRESS NOTES
extremities in order to progress to PLOF and address remaining functional goals. (see flow sheet as applicable)     Details if applicable:   tband IR/ER, cervical retraction, scapular retraction, supine arm lengthener   2  40915 Manual Therapy (timed):  decrease pain, increase ROM, and increase tissue extensibility to improve patient's ability to progress to PLOF and address remaining functional goals.  The manual therapy interventions were performed at a separate and distinct time from the therapeutic activities interventions . (see flow sheet as applicable)     Details if applicable:  STM/MFR to left upper trap   8  37827 Self Care/Home Management (timed):  improve patient knowledge and understanding of pain reducing techniques and positioning  to improve patient's ability to progress to PLOF and address remaining functional goals.  (see flow sheet as applicable)     Details if applicable:  educated on proper positioning while sleeping to avoid neck pain   38  MC BC Totals Reminder: bill using total billable min of TIMED therapeutic procedures (example: do not include dry needle or estim unattended, both untimed codes, in totals to left)  8-22 min = 1 unit; 23-37 min = 2 units; 38-52 min = 3 units; 53-67 min = 4 units; 68-82 min = 5 units   Total Total     [x]  Patient Education billed concurrently with other procedures   [x] Review HEP    [x] Progressed/Changed HEP, detail:  Access Code: [email or web address]  URL: [email or web address]  Date: 07/05/2024  Prepared by: Desi Palafox    Exercises  - Standing Row with Anchored Resistance  - 1 x daily - 7 x weekly - 1-2 sets - 10 reps - 3 seconds hold  - Shoulder extension with resistance - Neutral  - 1 x daily - 7 x weekly - 1-2 sets - 10 reps - 3 seconds hold  [] Other detail:       Objective Information/Functional Measures/Assessment    Began exercise progression per flowsheet.    Patient will continue to benefit from skilled PT / OT services to

## 2024-07-12 ENCOUNTER — APPOINTMENT (OUTPATIENT)
Facility: HOSPITAL | Age: 49
End: 2024-07-12
Payer: COMMERCIAL

## 2024-08-19 DIAGNOSIS — I10 ESSENTIAL (PRIMARY) HYPERTENSION: ICD-10-CM

## 2024-08-19 RX ORDER — AMLODIPINE BESYLATE 2.5 MG/1
2.5 TABLET ORAL DAILY
Qty: 90 TABLET | Refills: 1 | Status: SHIPPED | OUTPATIENT
Start: 2024-08-19

## 2024-08-19 NOTE — TELEPHONE ENCOUNTER
Medication(s) requesting:   Requested Prescriptions     Pending Prescriptions Disp Refills    amLODIPine (NORVASC) 2.5 MG tablet [Pharmacy Med Name: AMLODIPINE BESYLATE 2.5 MG TAB] 90 tablet 1     Sig: TAKE 1 TABLET BY MOUTH EVERY DAY       Last office visit:  06/17/2024  Next office visit DMA: 8/29/2024

## 2024-10-31 ENCOUNTER — OFFICE VISIT (OUTPATIENT)
Age: 49
End: 2024-10-31
Payer: COMMERCIAL

## 2024-10-31 VITALS — HEIGHT: 62 IN | BODY MASS INDEX: 35.51 KG/M2 | WEIGHT: 193 LBS

## 2024-10-31 DIAGNOSIS — M25.50 ACUTE PAIN IN JOINT: Primary | ICD-10-CM

## 2024-10-31 PROCEDURE — 99214 OFFICE O/P EST MOD 30 MIN: CPT | Performed by: PHYSICIAN ASSISTANT

## 2024-10-31 RX ORDER — HYDROCODONE BITARTRATE AND ACETAMINOPHEN 5; 325 MG/1; MG/1
1 TABLET ORAL EVERY 8 HOURS PRN
Qty: 20 TABLET | Refills: 0 | Status: SHIPPED | OUTPATIENT
Start: 2024-10-31 | End: 2024-11-07

## 2024-10-31 NOTE — PROGRESS NOTES
alert, and oriented to person, place and time. Behavior is normal. Thought content normal.   Cardiovascular: No clubbing, cyanosis.  No edema bilateral lower extremities.   Pulmonary: No tripoding nor accessory muscle recruitment. Breathing normally, no distress, no audible wheezing.     Distal cap refill intact at 2/2 Kyree UE / LE.  Neuro intact Kyree UE/LE to noxious stimuli      Ortho Specific exam:  Seated at bedside left shoulder active range of motion 140 degrees forward flexion against resistance with a mildly positive Elizabeth sign.  Passive external rotation guarded at 40 degrees.  Internal rotation L4 with guarding and pain.  Left elbow range of motion 110 degrees -0.  Distal sensation intact fully left upper extremity.        IMAGING:  Imaging read by myself and interpreted as follows:  X-ray: [place] 6/13/2024 3 view of the left shoulder to include Y reveals no acute fracture deformity lesions or masses noted.  No soft tissue ossifications.    IMPRESSION:      ICD-10-CM    1. Acute pain in joint  M25.50 HYDROcodone-acetaminophen (NORCO) 5-325 MG per tablet      2.  Decreased range of motion  left shoulder  3.  Osteoarthritis left shoulder  4.  Suspect internal derangement left shoulder  5.  Impingement syndrome left shoulder      PLAN: MRI ordered left shoulder patient will self limit her push, pull, lift, carry left upper extremity.  Work note provided today.  Hydrocodone provided for symptom management.      .    Hold on physical therapy at this time.      Special note: I reviewed and agree with the PFSH and the ROS as well as the intake form in the chart in the record.  I have reviewed prior medical record(s) in detail regarding this patient in this care appointment.          Appropriate for outpatient management. Will discuss supportive treatment, NSAIDS, RICE and orthopedic follow-up. Discussed treatment plan, return precautions, symptomatic relief, and expected time to improvement. All questions

## 2024-11-05 ENCOUNTER — HOSPITAL ENCOUNTER (OUTPATIENT)
Facility: HOSPITAL | Age: 49
Discharge: HOME | End: 2024-11-08

## 2024-11-05 ENCOUNTER — TELEPHONE (OUTPATIENT)
Age: 49
End: 2024-11-05

## 2024-11-05 DIAGNOSIS — M25.50 ACUTE PAIN IN JOINT: ICD-10-CM

## 2024-11-05 NOTE — TELEPHONE ENCOUNTER
Patient has called in for a work note due to the MRI that she had to do today    Patient has requested that her MRI order be sent to [facility] for the open MRI    Please advise patient @ [phone]

## 2024-11-05 NOTE — TELEPHONE ENCOUNTER
11/5/24 Patient did not have her MRI today due to it being closed MRI. We sent her order to [facility] in order to get an open MRI

## 2024-11-09 DIAGNOSIS — I10 ESSENTIAL (PRIMARY) HYPERTENSION: ICD-10-CM

## 2024-11-09 DIAGNOSIS — M79.18 MYOFASCIAL MUSCLE PAIN: ICD-10-CM

## 2024-11-11 RX ORDER — AMLODIPINE BESYLATE 2.5 MG/1
2.5 TABLET ORAL DAILY
Qty: 90 TABLET | Refills: 1 | Status: SHIPPED | OUTPATIENT
Start: 2024-11-11

## 2024-11-12 ENCOUNTER — OFFICE VISIT (OUTPATIENT)
Age: 49
End: 2024-11-12
Payer: COMMERCIAL

## 2024-11-12 VITALS — BODY MASS INDEX: 34.2 KG/M2 | HEIGHT: 63 IN | TEMPERATURE: 97.5 F | WEIGHT: 193 LBS

## 2024-11-12 DIAGNOSIS — M25.512 ACUTE PAIN OF LEFT SHOULDER: Primary | ICD-10-CM

## 2024-11-12 PROCEDURE — 99214 OFFICE O/P EST MOD 30 MIN: CPT | Performed by: PHYSICIAN ASSISTANT

## 2024-11-12 NOTE — PROGRESS NOTES
discharge and outpatient management. Medication use, risk/benefit, side effects, and precautions discussed.        Care plan outlined and precautions discussed.  Results were reviewed with the patient. All medications were reviewed with the patient. All of pt's questions and concerns were addressed.  Alarm symptoms and return precautions associated with chief complaint and evaluation were reviewed with the patient in detail.  The patient demonstrated adequate understanding.The patient expresses willing compliance with the treatment plan.     Special note: Medication management discussed in detail all patient's questions answered to their satisfaction.        Patient provided a reminder for a \"due or due soon\" health maintenance. I have asked the patient to schedule an appointment with their primary care provider for follow-up on general health maintenance concerns.    Today all the patient's questions were answered to their satisfaction.  Copies of x-rays reviewed if obtained this visit, and provided to patient.        Dictation disclaimer:  Please note that this dictation was completed with \"Dragon\", the computer voice recognition software. Today's exam was dictated using fluency dictation software (voice recognition software). Occasionally, sound-a-like computer induced   dictation errors will populate the report.  Quite often unanticipated grammatical, syntax, homophones, and other interpretive errors are inadvertently transcribed by the computer software.  Please disregard these errors.  Please excuse any errors that have escaped final proofreading.        Aly HERNANDES, APC, MPAS, PA-C  [facility]  [facility]

## 2024-11-29 NOTE — TELEPHONE ENCOUNTER
Medication(s) requesting:   Requested Prescriptions     Pending Prescriptions Disp Refills    diclofenac (VOLTAREN) 75 MG EC tablet 60 tablet 3     Sig: Take 1 tablet by mouth 2 times daily       Last office visit:  6/17/2024  Next office visit DMA: 11/9/2024

## 2024-11-30 RX ORDER — DICLOFENAC SODIUM 75 MG/1
75 TABLET, DELAYED RELEASE ORAL 2 TIMES DAILY
Qty: 60 TABLET | Refills: 3 | Status: SHIPPED | OUTPATIENT
Start: 2024-11-30

## 2024-12-10 ENCOUNTER — COMMUNITY OUTREACH (OUTPATIENT)
Facility: CLINIC | Age: 49
End: 2024-12-10

## 2025-01-08 ENCOUNTER — TELEPHONE (OUTPATIENT)
Facility: CLINIC | Age: 50
End: 2025-01-08

## 2025-01-16 ENCOUNTER — HOSPITAL ENCOUNTER (OUTPATIENT)
Dept: WOMENS IMAGING | Facility: HOSPITAL | Age: 50
Discharge: HOME | End: 2025-01-19
Payer: COMMERCIAL

## 2025-01-16 DIAGNOSIS — Z12.31 VISIT FOR SCREENING MAMMOGRAM: ICD-10-CM

## 2025-01-16 PROCEDURE — 77063 BREAST TOMOSYNTHESIS BI: CPT

## 2025-01-30 ENCOUNTER — TELEPHONE (OUTPATIENT)
Age: 50
End: 2025-01-30

## 2025-03-25 ENCOUNTER — HOSPITAL ENCOUNTER (OUTPATIENT)
Facility: HOSPITAL | Age: 50
Setting detail: SPECIMEN
Discharge: HOME | End: 2025-03-28

## 2025-03-25 ENCOUNTER — OFFICE VISIT (OUTPATIENT)
Facility: CLINIC | Age: 50
End: 2025-03-25
Payer: COMMERCIAL

## 2025-03-25 VITALS
HEIGHT: 63 IN | HEART RATE: 61 BPM | SYSTOLIC BLOOD PRESSURE: 130 MMHG | WEIGHT: 190.8 LBS | TEMPERATURE: 97.2 F | BODY MASS INDEX: 33.81 KG/M2 | RESPIRATION RATE: 14 BRPM | OXYGEN SATURATION: 99 % | DIASTOLIC BLOOD PRESSURE: 84 MMHG

## 2025-03-25 DIAGNOSIS — Z00.00 ENCOUNTER FOR ROUTINE ADULT MEDICAL EXAMINATION: ICD-10-CM

## 2025-03-25 DIAGNOSIS — E55.9 VITAMIN D DEFICIENCY: ICD-10-CM

## 2025-03-25 DIAGNOSIS — Z71.3 WEIGHT LOSS COUNSELING, ENCOUNTER FOR: ICD-10-CM

## 2025-03-25 DIAGNOSIS — J18.9 ATYPICAL PNEUMONIA: ICD-10-CM

## 2025-03-25 DIAGNOSIS — I10 ESSENTIAL (PRIMARY) HYPERTENSION: ICD-10-CM

## 2025-03-25 DIAGNOSIS — R73.03 PREDIABETES: Primary | ICD-10-CM

## 2025-03-25 DIAGNOSIS — E78.49 OTHER HYPERLIPIDEMIA: ICD-10-CM

## 2025-03-25 DIAGNOSIS — Z12.11 COLON CANCER SCREENING: ICD-10-CM

## 2025-03-25 LAB — HBA1C MFR BLD: 5.6 %

## 2025-03-25 PROCEDURE — 99001 SPECIMEN HANDLING PT-LAB: CPT

## 2025-03-25 PROCEDURE — 99214 OFFICE O/P EST MOD 30 MIN: CPT | Performed by: STUDENT IN AN ORGANIZED HEALTH CARE EDUCATION/TRAINING PROGRAM

## 2025-03-25 PROCEDURE — 83036 HEMOGLOBIN GLYCOSYLATED A1C: CPT | Performed by: STUDENT IN AN ORGANIZED HEALTH CARE EDUCATION/TRAINING PROGRAM

## 2025-03-25 PROCEDURE — 3075F SYST BP GE 130 - 139MM HG: CPT | Performed by: STUDENT IN AN ORGANIZED HEALTH CARE EDUCATION/TRAINING PROGRAM

## 2025-03-25 PROCEDURE — 3079F DIAST BP 80-89 MM HG: CPT | Performed by: STUDENT IN AN ORGANIZED HEALTH CARE EDUCATION/TRAINING PROGRAM

## 2025-03-25 PROCEDURE — 99396 PREV VISIT EST AGE 40-64: CPT | Performed by: STUDENT IN AN ORGANIZED HEALTH CARE EDUCATION/TRAINING PROGRAM

## 2025-03-25 RX ORDER — AMLODIPINE BESYLATE 2.5 MG/1
2.5 TABLET ORAL DAILY
Qty: 90 TABLET | Refills: 3 | Status: SHIPPED | OUTPATIENT
Start: 2025-03-25

## 2025-03-25 RX ORDER — AZITHROMYCIN 250 MG/1
TABLET, FILM COATED ORAL
Qty: 6 TABLET | Refills: 0 | Status: SHIPPED | OUTPATIENT
Start: 2025-03-25 | End: 2025-04-04

## 2025-03-25 SDOH — ECONOMIC STABILITY: FOOD INSECURITY: WITHIN THE PAST 12 MONTHS, THE FOOD YOU BOUGHT JUST DIDN'T LAST AND YOU DIDN'T HAVE MONEY TO GET MORE.: NEVER TRUE

## 2025-03-25 SDOH — ECONOMIC STABILITY: FOOD INSECURITY: WITHIN THE PAST 12 MONTHS, YOU WORRIED THAT YOUR FOOD WOULD RUN OUT BEFORE YOU GOT MONEY TO BUY MORE.: NEVER TRUE

## 2025-03-25 ASSESSMENT — PATIENT HEALTH QUESTIONNAIRE - PHQ9
1. LITTLE INTEREST OR PLEASURE IN DOING THINGS: NOT AT ALL
9. THOUGHTS THAT YOU WOULD BE BETTER OFF DEAD, OR OF HURTING YOURSELF: NOT AT ALL
SUM OF ALL RESPONSES TO PHQ QUESTIONS 1-9: 0
10. IF YOU CHECKED OFF ANY PROBLEMS, HOW DIFFICULT HAVE THESE PROBLEMS MADE IT FOR YOU TO DO YOUR WORK, TAKE CARE OF THINGS AT HOME, OR GET ALONG WITH OTHER PEOPLE: NOT DIFFICULT AT ALL
5. POOR APPETITE OR OVEREATING: NOT AT ALL
2. FEELING DOWN, DEPRESSED OR HOPELESS: NOT AT ALL
SUM OF ALL RESPONSES TO PHQ QUESTIONS 1-9: 0
7. TROUBLE CONCENTRATING ON THINGS, SUCH AS READING THE NEWSPAPER OR WATCHING TELEVISION: NOT AT ALL
4. FEELING TIRED OR HAVING LITTLE ENERGY: NOT AT ALL
SUM OF ALL RESPONSES TO PHQ QUESTIONS 1-9: 0
SUM OF ALL RESPONSES TO PHQ QUESTIONS 1-9: 0
3. TROUBLE FALLING OR STAYING ASLEEP: NOT AT ALL
8. MOVING OR SPEAKING SO SLOWLY THAT OTHER PEOPLE COULD HAVE NOTICED. OR THE OPPOSITE, BEING SO FIGETY OR RESTLESS THAT YOU HAVE BEEN MOVING AROUND A LOT MORE THAN USUAL: NOT AT ALL
6. FEELING BAD ABOUT YOURSELF - OR THAT YOU ARE A FAILURE OR HAVE LET YOURSELF OR YOUR FAMILY DOWN: NOT AT ALL

## 2025-03-25 ASSESSMENT — ENCOUNTER SYMPTOMS
VOMITING: 0
DIARRHEA: 0
EYE PAIN: 0
FACIAL SWELLING: 0
CONSTIPATION: 0
COUGH: 1
ABDOMINAL PAIN: 0
BACK PAIN: 0
EYE ITCHING: 0
EYE DISCHARGE: 0
SHORTNESS OF BREATH: 0
EYE REDNESS: 0
COLOR CHANGE: 0

## 2025-03-25 NOTE — PROGRESS NOTES
Nurys Santana is a 50 y.o.  female and presents with    Chief Complaint   Patient presents with    Annual Exam    Congestion    Weight Loss           Subjective:    She keeps on trying to loose weight. She has been trying to do healthy eating, and has added walking to her regimen. She has been working on this since 2024, and has only been able to loose about 4 lbs.     Health Maintenance  Colon cancer: due at age 50  Dyslipidemia: will check FLP and CMP  Diabetes mellitus: will check FBG  Influenza vaccine: due in the fall  Pneumococcal vaccine:   Tdap:   Herpes Zoster vaccine: due at age 50  Hep B vaccine: not indicated    Weight: Body mass index is Estimated body mass index is Body mass index is 33.8 kg/m².. Discussed the patient's BMI with her.  The BMI follow up plan is as follows: Improve diet and 30 min of moderate activity at least 5 times a week.  Cervical cancer:  Pap smear hysterectomy   Breast Cancer: Mammogram done 2025   Osteoporosis: DEXA scan will be due at 65  Diet:  frequent  Exercise:  frequent  Seatbelt:  YES  Sunscreen: no  Dentist:  has upcoming appts  Supplements : none    For over a week she has been feeling sick. She has tried robitussin, and mucinex. She has been coughing a lot. At the beginning she had a 99F in temperature. Had body aches at the beginning.     Hypertension follow up:  Taking medications as prescribed: Yes  Checking BP at home: Yes  Symptoms: none  Low sodium diet: No - doing better  Exercise: YES - has been riding a bike.     Patient Active Problem List   Diagnosis    S/P laparoscopic assisted vaginal hysterectomy (LAVH)    Depression    Anemia      Past Medical History:   Diagnosis Date    Anemia 10/13/2014    Depression     Fibroids 2016    H/O excision of dermoid cyst 2016    HTN (hypertension)     Hypertension       Past Surgical History:   Procedure Laterality Date     SECTION  ,     CYST REMOVAL

## 2025-03-25 NOTE — PROGRESS NOTES
Nurys Santana is a 50 y.o. year old female who presents today for   Chief Complaint   Patient presents with    Annual Exam    Congestion    Weight Loss        \"Have you been to the ER, urgent care clinic since your last visit?  Hospitalized since your last visit?\"   NO     “Have you seen or consulted any other health care providers outside our system since your last visit?”   NO       “Have you had a colorectal cancer screening such as a colonoscopy/FIT/Cologuard?    NO    No colonoscopy on file  Date of last Cologuard: 11/5/2021  Date of last FIT: 5/2/2023   No flexible sigmoidoscopy on file           - VERNON Garcia  [facility]  Phone: [phone]  Fax: [phone]

## 2025-03-26 LAB
A/G RATIO: 1.5 RATIO (ref 1.1–2.6)
ALBUMIN: 4.3 G/DL (ref 3.5–5)
ALP BLD-CCNC: 111 U/L (ref 25–115)
ALT SERPL-CCNC: 25 U/L (ref 5–40)
ANION GAP SERPL CALCULATED.3IONS-SCNC: 11 MMOL/L (ref 3–15)
AST SERPL-CCNC: 20 U/L (ref 10–37)
BILIRUB SERPL-MCNC: 0.2 MG/DL (ref 0.2–1.2)
BILIRUBIN, URINE: NEGATIVE
BUN BLDV-MCNC: 8 MG/DL (ref 6–22)
CALCIUM SERPL-MCNC: 9.4 MG/DL (ref 8.4–10.5)
CHLORIDE BLD-SCNC: 103 MMOL/L (ref 98–110)
CHOLESTEROL, TOTAL: 176 MG/DL (ref 110–200)
CHOLESTEROL/HDL RATIO: 3.5 (ref 0–5)
CLARITY, UA: CLEAR
CO2: 25 MMOL/L (ref 20–32)
COLOR, UA: YELLOW
CREAT SERPL-MCNC: 0.8 MG/DL (ref 0.5–1.2)
GFR, ESTIMATED: >60 ML/MIN/1.73 SQ.M.
GLOBULIN: 2.8 G/DL (ref 2–4)
GLUCOSE URINE: NEGATIVE MG/DL
GLUCOSE: 80 MG/DL (ref 70–99)
HCT VFR BLD CALC: 42.4 % (ref 35.1–48)
HDLC SERPL-MCNC: 50 MG/DL
HEMOGLOBIN: 13.2 G/DL (ref 11.7–16)
KETONES, URINE: NEGATIVE MG/DL
LDL CHOLESTEROL: 112 MG/DL (ref 50–99)
LDL/HDL RATIO: 2.3
LEUKOCYTE ESTERASE, URINE: NEGATIVE
MCH RBC QN AUTO: 30 PG (ref 26–34)
MCHC RBC AUTO-ENTMCNC: 31 G/DL (ref 31–36)
MCV RBC AUTO: 96 FL (ref 80–99)
NITRITE, URINE: NEGATIVE
NON-HDL CHOLESTEROL: 126 MG/DL
OCCULT BLOOD,URINE: NEGATIVE
PDW BLD-RTO: 13.2 % (ref 10–15.5)
PH, URINE: 6.5 PH (ref 5–8)
PLATELET # BLD: 337 K/UL (ref 140–440)
PMV BLD AUTO: 10.3 FL (ref 9–13)
POTASSIUM SERPL-SCNC: 4.2 MMOL/L (ref 3.5–5.5)
PROTEIN, URINE: NEGATIVE MG/DL
RBC # BLD: 4.4 M/UL (ref 3.8–5.2)
SODIUM BLD-SCNC: 139 MMOL/L (ref 133–145)
SPECIFIC GRAVITY UA: 1.01 (ref 1–1.03)
TOTAL PROTEIN: 7.1 G/DL (ref 6.4–8.3)
TRIGL SERPL-MCNC: 66 MG/DL (ref 40–149)
TSH SERPL DL<=0.05 MIU/L-ACNC: 0.52 MCU/ML (ref 0.27–4.2)
UROBILINOGEN, URINE: 0.2 MG/DL
VITAMIN D 25-HYDROXY: 27.1 NG/ML (ref 32–100)
VLDLC SERPL CALC-MCNC: 13 MG/DL (ref 8–30)
WBC # BLD: 5.4 K/UL (ref 4–11)

## 2025-03-27 ENCOUNTER — OFFICE VISIT (OUTPATIENT)
Age: 50
End: 2025-03-27
Payer: COMMERCIAL

## 2025-03-27 VITALS
DIASTOLIC BLOOD PRESSURE: 87 MMHG | HEIGHT: 63 IN | HEART RATE: 71 BPM | WEIGHT: 190 LBS | BODY MASS INDEX: 33.66 KG/M2 | OXYGEN SATURATION: 98 % | SYSTOLIC BLOOD PRESSURE: 128 MMHG

## 2025-03-27 DIAGNOSIS — R06.02 SOB (SHORTNESS OF BREATH): Primary | ICD-10-CM

## 2025-03-27 DIAGNOSIS — R00.2 PALPITATION: ICD-10-CM

## 2025-03-27 DIAGNOSIS — R07.9 CHEST PAIN, UNSPECIFIED TYPE: ICD-10-CM

## 2025-03-27 LAB — SENTARA SPECIMEN COLLECTION: NORMAL

## 2025-03-27 PROCEDURE — 99204 OFFICE O/P NEW MOD 45 MIN: CPT | Performed by: INTERNAL MEDICINE

## 2025-03-27 PROCEDURE — 93000 ELECTROCARDIOGRAM COMPLETE: CPT | Performed by: INTERNAL MEDICINE

## 2025-03-27 RX ORDER — ASPIRIN 81 MG/1
81 TABLET ORAL DAILY
Qty: 90 TABLET | Refills: 2 | Status: SHIPPED | OUTPATIENT
Start: 2025-03-27

## 2025-03-27 ASSESSMENT — PATIENT HEALTH QUESTIONNAIRE - PHQ9
SUM OF ALL RESPONSES TO PHQ QUESTIONS 1-9: 0
SUM OF ALL RESPONSES TO PHQ QUESTIONS 1-9: 0
10. IF YOU CHECKED OFF ANY PROBLEMS, HOW DIFFICULT HAVE THESE PROBLEMS MADE IT FOR YOU TO DO YOUR WORK, TAKE CARE OF THINGS AT HOME, OR GET ALONG WITH OTHER PEOPLE: NOT DIFFICULT AT ALL
1. LITTLE INTEREST OR PLEASURE IN DOING THINGS: NOT AT ALL
8. MOVING OR SPEAKING SO SLOWLY THAT OTHER PEOPLE COULD HAVE NOTICED. OR THE OPPOSITE, BEING SO FIGETY OR RESTLESS THAT YOU HAVE BEEN MOVING AROUND A LOT MORE THAN USUAL: NOT AT ALL
9. THOUGHTS THAT YOU WOULD BE BETTER OFF DEAD, OR OF HURTING YOURSELF: NOT AT ALL
SUM OF ALL RESPONSES TO PHQ QUESTIONS 1-9: 0
4. FEELING TIRED OR HAVING LITTLE ENERGY: NOT AT ALL
SUM OF ALL RESPONSES TO PHQ QUESTIONS 1-9: 0
6. FEELING BAD ABOUT YOURSELF - OR THAT YOU ARE A FAILURE OR HAVE LET YOURSELF OR YOUR FAMILY DOWN: NOT AT ALL
2. FEELING DOWN, DEPRESSED OR HOPELESS: NOT AT ALL
5. POOR APPETITE OR OVEREATING: NOT AT ALL
3. TROUBLE FALLING OR STAYING ASLEEP: NOT AT ALL
7. TROUBLE CONCENTRATING ON THINGS, SUCH AS READING THE NEWSPAPER OR WATCHING TELEVISION: NOT AT ALL

## 2025-03-27 NOTE — PROGRESS NOTES
Identified pt with two pt identifiers(name and ). Reviewed record in preparation for visit and have obtained necessary documentation.    Nurys Santana presents today for   Chief Complaint   Patient presents with    Follow-up     Last seen        Pt denies DIZZINESS, SOB, CHEST PAIN/ PRESSURE, FATIGUE/WEAKNESS, HEADACHES, SWELLING.             Nurys Santana preferred language for health care discussion is english/other.    Personal Protective Equipment:   Personal Protective Equipment was used including: mask-surgical and hands-gloves. Patient was placed on no precaution(s). Patient was masked.    Precautions:   Patient currently on None  Patient currently roomed with door closed.    Is someone accompanying this pt? no    Is the patient using any DME equipment during OV? no    Depression Screening:      3/27/2025     1:08 PM 3/25/2025    10:39 AM 2024     8:20 AM 2023     8:03 AM 2023     9:41 AM 2022     9:06 AM 2022     8:06 AM   PHQ-9 Questionaire   Little interest or pleasure in doing things 0 0 0 0 0 0 0   Feeling down, depressed, or hopeless 0 0 0 0 0 0 0   Trouble falling or staying asleep, or sleeping too much 0 0 0 0 0     Feeling tired or having little energy 0 0 0 0 0     Poor appetite or overeating 0 0 0 0 0     Feeling bad about yourself - or that you are a failure or have let yourself or your family down 0 0 0 0 0     Trouble concentrating on things, such as reading the newspaper or watching television 0 0 0 0 0     Moving or speaking so slowly that other people could have noticed. Or the opposite - being so fidgety or restless that you have been moving around a lot more than usual 0 0 0 0 0     Thoughts that you would be better off dead, or of hurting yourself in some way 0 0 0 0 0     PHQ-9 Total Score 0 0 0 0 0 0 0   If you checked off any problems, how difficult have these problems made it for you to do your work, take care of things at home, or get along with other

## 2025-03-27 NOTE — PATIENT INSTRUCTIONS
Echo  PATIENT PREPS:   -Wear easy to remove shirt to your appointment for easier accessibility.    Nuclear Stress Instructions-  PATIENT PREPS:   -NPO (Nothing to eat or drink) after midnight the night prior to the exam.    -No medications on the day of exam. You may bring them with you.   -Wear comfortable clothing and shoes suitable for walking on a treadmill. (NO sandals, flip flops, high heels, etc)   -The duration of this exam is approximately 2 to 4 hours.      **call office 3-5 days after testing is completed for results** Please ensure testing is completed prior to scheduled follow up appointment. If it is not completed your appointment may be rescheduled**    New Medication/Medication Changes/Medication Refill  Aspirin 81 mg tab daily     **please allow 24-48 hrs for medication to be escribed to pharmacy** If you need any refills on medications please contact your pharmacy so that the request can be escribed to the provider for review seven to 10 days prior to being out of medication.

## 2025-03-27 NOTE — PROGRESS NOTES
[facility]    Nurys Santana is 50 y.o. female     Patient is here today for cardiac evaluation  Denies prior history of MI or CHF  Here for cardiac evaluation.  Patient is very nervous about her cardiac status as she has strong family history.  Both parents had multiple cardiac problem including heart failure and CAD.  Brother had a CABG at age of 44.  Patient has mild dyspnea on moderate exertion.  Feels like she cannot climb more than 1 or 2 flight of stairs without getting short of breath.  She also has noticing some random chest pain however sometimes sharp sometimes dull and sometimes tingling sensation lasting usually less than 5 minutes.  No radiation.  Nonexertional.  Happens once or twice a month.  No orthopnea or PND.  No significant palpitation, presyncope sink  Denies any nausea, vomiting, abdominal pain, fever, chills, sputum production. No hematuria or other bleeding complaints    Past Medical History:   Diagnosis Date    Anemia 10/13/2014    Depression     Fibroids 2016    H/O excision of dermoid cyst 2016    HTN (hypertension)     Hypertension        Review of Systems:  Cardiac symptoms as noted above in HPI. All others negative.  Denies fatigue, malaise, skin rash, joint pain, blurring vision, photophobia, neck pain, hemoptysis, chronic cough, nausea, vomiting, hematuria, burning micturition, BRBPR, chronic headaches.    Current Outpatient Medications   Medication Sig    amLODIPine (NORVASC) 2.5 MG tablet Take 1 tablet by mouth daily    tirzepatide-weight management (ZEPBOUND) 2.5 MG/0.5ML SOAJ subCUTAneous auto-injector pen Inject 2.5 mg into the skin every 7 days    azithromycin (ZITHROMAX) 250 MG tablet 500mg on day 1 followed by 250mg on days 2 - 5     No current facility-administered medications for this visit.       Past Surgical History:   Procedure Laterality Date     SECTION  ,

## 2025-04-09 ENCOUNTER — RESULTS FOLLOW-UP (OUTPATIENT)
Facility: CLINIC | Age: 50
End: 2025-04-09

## 2025-04-17 LAB — NONINV COLON CA DNA+OCC BLD SCRN STL QL: NEGATIVE

## 2025-04-25 ENCOUNTER — OFFICE VISIT (OUTPATIENT)
Facility: CLINIC | Age: 50
End: 2025-04-25
Payer: COMMERCIAL

## 2025-04-25 VITALS
DIASTOLIC BLOOD PRESSURE: 79 MMHG | HEART RATE: 66 BPM | BODY MASS INDEX: 33.13 KG/M2 | WEIGHT: 187 LBS | OXYGEN SATURATION: 95 % | RESPIRATION RATE: 18 BRPM | HEIGHT: 63 IN | TEMPERATURE: 98 F | SYSTOLIC BLOOD PRESSURE: 125 MMHG

## 2025-04-25 DIAGNOSIS — J18.9 ATYPICAL PNEUMONIA: Primary | ICD-10-CM

## 2025-04-25 DIAGNOSIS — R05.2 SUBACUTE COUGH: ICD-10-CM

## 2025-04-25 DIAGNOSIS — I10 ESSENTIAL (PRIMARY) HYPERTENSION: ICD-10-CM

## 2025-04-25 PROCEDURE — 3074F SYST BP LT 130 MM HG: CPT | Performed by: NURSE PRACTITIONER

## 2025-04-25 PROCEDURE — 99214 OFFICE O/P EST MOD 30 MIN: CPT | Performed by: NURSE PRACTITIONER

## 2025-04-25 PROCEDURE — 3078F DIAST BP <80 MM HG: CPT | Performed by: NURSE PRACTITIONER

## 2025-04-25 SDOH — ECONOMIC STABILITY: FOOD INSECURITY: WITHIN THE PAST 12 MONTHS, YOU WORRIED THAT YOUR FOOD WOULD RUN OUT BEFORE YOU GOT MONEY TO BUY MORE.: NEVER TRUE

## 2025-04-25 SDOH — ECONOMIC STABILITY: FOOD INSECURITY: WITHIN THE PAST 12 MONTHS, THE FOOD YOU BOUGHT JUST DIDN'T LAST AND YOU DIDN'T HAVE MONEY TO GET MORE.: NEVER TRUE

## 2025-04-25 ASSESSMENT — PATIENT HEALTH QUESTIONNAIRE - PHQ9
3. TROUBLE FALLING OR STAYING ASLEEP: NOT AT ALL
10. IF YOU CHECKED OFF ANY PROBLEMS, HOW DIFFICULT HAVE THESE PROBLEMS MADE IT FOR YOU TO DO YOUR WORK, TAKE CARE OF THINGS AT HOME, OR GET ALONG WITH OTHER PEOPLE: NOT DIFFICULT AT ALL
SUM OF ALL RESPONSES TO PHQ QUESTIONS 1-9: 0
SUM OF ALL RESPONSES TO PHQ QUESTIONS 1-9: 0
9. THOUGHTS THAT YOU WOULD BE BETTER OFF DEAD, OR OF HURTING YOURSELF: NOT AT ALL
8. MOVING OR SPEAKING SO SLOWLY THAT OTHER PEOPLE COULD HAVE NOTICED. OR THE OPPOSITE, BEING SO FIGETY OR RESTLESS THAT YOU HAVE BEEN MOVING AROUND A LOT MORE THAN USUAL: NOT AT ALL
2. FEELING DOWN, DEPRESSED OR HOPELESS: NOT AT ALL
4. FEELING TIRED OR HAVING LITTLE ENERGY: NOT AT ALL
6. FEELING BAD ABOUT YOURSELF - OR THAT YOU ARE A FAILURE OR HAVE LET YOURSELF OR YOUR FAMILY DOWN: NOT AT ALL
7. TROUBLE CONCENTRATING ON THINGS, SUCH AS READING THE NEWSPAPER OR WATCHING TELEVISION: NOT AT ALL
5. POOR APPETITE OR OVEREATING: NOT AT ALL
1. LITTLE INTEREST OR PLEASURE IN DOING THINGS: NOT AT ALL
SUM OF ALL RESPONSES TO PHQ QUESTIONS 1-9: 0
SUM OF ALL RESPONSES TO PHQ QUESTIONS 1-9: 0

## 2025-04-25 NOTE — PROGRESS NOTES
Nurys Santana is a 50 y.o. year old female who presents today for   Chief Complaint   Patient presents with    Shortness of Breath    Cough       \"Have you been to the ER, urgent care clinic since your last visit?  Hospitalized since your last visit?\"    [facility] for lingering cough and dry mucous on 4/22     “Have you seen or consulted any other health care providers outside our system since your last visit?”    no          Click Here for Release of Records Request    - JOON Benitez  [facility]  Phone: [phone]  Fax: [phone]

## 2025-04-25 NOTE — PROGRESS NOTES
Nurys Santana is a 50 y.o. female  established patient, here for evaluation of the following chief complaint(s):  Chief Complaint   Patient presents with    Shortness of Breath    Cough      Assessment and Plan  1. Atypical pneumonia  -     amoxicillin-clavulanate (AUGMENTIN) 875-125 MG per tablet; Take 1 tablet by mouth 2 times daily for 7 days, Disp-14 tablet, R-0Normal  2. Subacute cough  3. Essential (primary) hypertension       Return in 2 weeks (on 5/9/2025), or if symptoms worsen or fail to improve, for Follow-up with your PCP.   HPI:   In office visit for acute care related to wheezing and shortness of breath.    Pt was told by her PCP 3/2025 she has \"walking pneumonia.\" Pt was treated w/ azithromycin. She felt better but last Friday her symptoms came back w/ wheezing and a low grade fever. She went to urgent care 4/22/2025.  Tested negative for flu, COVID and strep.  Had a chest xray and no pneumonia was seen.  Radiology interpretation was pending.  Per note possible bronchitis or early pneumonia. She was told she may have asthma but denies she has a history of asthma.  She was dispensed doxycycline, albuterol and a Medrol Dosepak. She is still feeling chest congestion and wheezing. She denies smoking anything, vaping or sick contacts.   Added Augmentin today 4/25/2025.  Follow-up with PCP.  Received office visit from [facility]    Hypertension  On amlodipine  3/27/2025 seen cardiology related to shortness of breath.  Per cardiology note, possibly somewhat atypical chest pain.  Patient has a strong family history of premature CAD.  Patient will have a nuclear stress test.  Patient was started on aspirin 81 mg daily.    Low vitamin D    Elevated LDL    Recent negative Cologuard  ROS:    General: negative for - chills, fever, weight changes or malaise  HEENT: no sore throat, nasal congestion, vision problems or ear problems  Respiratory: + cough, shortness of breath, or

## 2025-05-14 ENCOUNTER — TELEPHONE (OUTPATIENT)
Age: 50
End: 2025-05-14

## 2025-05-15 ENCOUNTER — RESULTS FOLLOW-UP (OUTPATIENT)
Age: 50
End: 2025-05-15

## 2025-06-25 ENCOUNTER — TELEMEDICINE (OUTPATIENT)
Facility: CLINIC | Age: 50
End: 2025-06-25
Payer: COMMERCIAL

## 2025-06-25 DIAGNOSIS — E66.811 CLASS 1 OBESITY WITHOUT SERIOUS COMORBIDITY WITH BODY MASS INDEX (BMI) OF 33.0 TO 33.9 IN ADULT, UNSPECIFIED OBESITY TYPE: Primary | ICD-10-CM

## 2025-06-25 DIAGNOSIS — M85.89 OSTEOPENIA OF MULTIPLE SITES: ICD-10-CM

## 2025-06-25 PROCEDURE — 99214 OFFICE O/P EST MOD 30 MIN: CPT | Performed by: STUDENT IN AN ORGANIZED HEALTH CARE EDUCATION/TRAINING PROGRAM

## 2025-06-25 RX ORDER — TOPIRAMATE 50 MG/1
50 TABLET, FILM COATED ORAL 2 TIMES DAILY
Qty: 60 TABLET | Refills: 5 | Status: SHIPPED | OUTPATIENT
Start: 2025-06-25

## 2025-06-25 RX ORDER — VITAMIN K2 90 MCG
1000 CAPSULE ORAL DAILY
Qty: 90 CAPSULE | Refills: 1 | Status: SHIPPED | OUTPATIENT
Start: 2025-06-25

## 2025-06-25 ASSESSMENT — ENCOUNTER SYMPTOMS
COLOR CHANGE: 0
SHORTNESS OF BREATH: 0
FACIAL SWELLING: 0
ABDOMINAL PAIN: 0
EYE PAIN: 0
BACK PAIN: 0
EYE ITCHING: 0
VOMITING: 0
EYE REDNESS: 0
EYE DISCHARGE: 0
DIARRHEA: 0
CONSTIPATION: 0

## 2025-06-25 ASSESSMENT — PATIENT HEALTH QUESTIONNAIRE - PHQ9
2. FEELING DOWN, DEPRESSED OR HOPELESS: NOT AT ALL
8. MOVING OR SPEAKING SO SLOWLY THAT OTHER PEOPLE COULD HAVE NOTICED. OR THE OPPOSITE, BEING SO FIGETY OR RESTLESS THAT YOU HAVE BEEN MOVING AROUND A LOT MORE THAN USUAL: NOT AT ALL
SUM OF ALL RESPONSES TO PHQ QUESTIONS 1-9: 0
10. IF YOU CHECKED OFF ANY PROBLEMS, HOW DIFFICULT HAVE THESE PROBLEMS MADE IT FOR YOU TO DO YOUR WORK, TAKE CARE OF THINGS AT HOME, OR GET ALONG WITH OTHER PEOPLE: NOT DIFFICULT AT ALL
6. FEELING BAD ABOUT YOURSELF - OR THAT YOU ARE A FAILURE OR HAVE LET YOURSELF OR YOUR FAMILY DOWN: NOT AT ALL
4. FEELING TIRED OR HAVING LITTLE ENERGY: NOT AT ALL
SUM OF ALL RESPONSES TO PHQ QUESTIONS 1-9: 0
5. POOR APPETITE OR OVEREATING: NOT AT ALL
SUM OF ALL RESPONSES TO PHQ QUESTIONS 1-9: 0
1. LITTLE INTEREST OR PLEASURE IN DOING THINGS: NOT AT ALL
3. TROUBLE FALLING OR STAYING ASLEEP: NOT AT ALL
SUM OF ALL RESPONSES TO PHQ QUESTIONS 1-9: 0
7. TROUBLE CONCENTRATING ON THINGS, SUCH AS READING THE NEWSPAPER OR WATCHING TELEVISION: NOT AT ALL
9. THOUGHTS THAT YOU WOULD BE BETTER OFF DEAD, OR OF HURTING YOURSELF: NOT AT ALL

## 2025-06-25 NOTE — PROGRESS NOTES
Nurys Santana is a 50 y.o.  female and presents with    No chief complaint on file.    Nurys Santana, was evaluated through a synchronous (real-time) audio-video encounter. The patient (or guardian if applicable) is aware that this is a billable service, which includes applicable co-pays. This Virtual Visit was conducted with patient's (and/or legal guardian's) consent. Patient identification was verified, and a caregiver was present when appropriate.   The patient was located at Home: [facility] [address] [facility] [address]  Provider was located at Facility (Appt Dept): [address], [address]  [place] [address]  Confirm you are appropriately licensed, registered, or certified to deliver care in the state where the patient is located as indicated above. If you are not or unsure, please re-schedule the visit: Yes, I confirm.        --Muna Orellana MD on 2025 at 11:47 AM    An electronic signature was used to authenticate this note.        Subjective:    Patient states that she was not able to receive the medication first abdominal.  She has been trying to make the changes when it comes to her weight, however she does not seem to be able to do so.  She does feel that sometimes cravings can be what are making her overeat.    Patient also would like to have a refill on vitamin D capsules that she had in the past.    Patient Active Problem List   Diagnosis    S/P laparoscopic assisted vaginal hysterectomy (LAVH)    Depression    Anemia      Past Medical History:   Diagnosis Date    Anemia 10/13/2014    Depression     Fibroids 2016    H/O excision of dermoid cyst 2016    HTN (hypertension)     Hypertension       Past Surgical History:   Procedure Laterality Date     SECTION  ,     CYST REMOVAL      right ovary    HYSTERECTOMY (CERVIX STATUS UNKNOWN)      due to heavy menses and uterine fibroids.     OVARY REMOVAL Right

## (undated) DEVICE — KENDALL SCD EXPRESS SLEEVES, KNEE LENGTH, MEDIUM: Brand: KENDALL SCD

## (undated) DEVICE — DRAPE,UTILITY,TAPE,15X26,STERILE: Brand: MEDLINE

## (undated) DEVICE — SHEARS: Brand: ENDO SHEARS

## (undated) DEVICE — ROUND SHAPE BALLOON: Brand: PDB

## (undated) DEVICE — LIGHT HANDLE: Brand: DEVON

## (undated) DEVICE — VCARE MEDIUM, UTERINE MANIPULATOR, VAGINAL-CERVICAL-AHLUWALIA'S-RETRACTOR-ELEVATOR: Brand: VCARE

## (undated) DEVICE — APPLICATOR SEAL FLOSEAL 5MM+ --

## (undated) DEVICE — INTENDED FOR TISSUE SEPARATION, AND OTHER PROCEDURES THAT REQUIRE A SHARP SURGICAL BLADE TO PUNCTURE OR CUT.: Brand: BARD-PARKER ® CARBON RIB-BACK BLADES

## (undated) DEVICE — SUTURE VCRL SZ 0 L18IN ABSRB UD POLYGLACTIN 910 BRAID TIE J912G

## (undated) DEVICE — PREP SKN CHLRAPRP 26ML TNT -- CONVERT TO ITEM 373320

## (undated) DEVICE — SYR 10ML CTRL LR LCK NSAF LF --

## (undated) DEVICE — BLADELESS OPTICAL TROCAR WITH FIXATION CANNULA: Brand: VERSAPORT

## (undated) DEVICE — [HIGH FLOW INSUFFLATOR,  DO NOT USE IF PACKAGE IS DAMAGED,  KEEP DRY,  KEEP AWAY FROM SUNLIGHT,  PROTECT FROM HEAT AND RADIOACTIVE SOURCES.]: Brand: PNEUMOSURE

## (undated) DEVICE — BARRIER TISS ADH ABSRB 3X4IN -- GYNECARE INTERCEED

## (undated) DEVICE — SUTURE MCRYL SZ 4-0 L18IN ABSRB UD L19MM PS-2 3/8 CIR PRIM Y496G

## (undated) DEVICE — OBTRTR BLDELSS 8MM DISP -- DA VINCI - SNGL USE

## (undated) DEVICE — DEVON™ KNEE AND BODY STRAP 60" X 3" (1.5 M X 7.6 CM): Brand: DEVON

## (undated) DEVICE — CATH URETH FOL 2W SH 14FRX5ML -- CONVERT TO ITEM 363071

## (undated) DEVICE — DISPOSABLE SUCTION/IRRIGATOR TUBE SET WITH TIP: Brand: AHTO

## (undated) DEVICE — Device

## (undated) DEVICE — VISUALIZATION SYSTEM: Brand: CLEARIFY

## (undated) DEVICE — SUTURE V-LOC 180 SZ 0 L12IN ABSRB GRN L37MM GS-21 1/2 CIR VLOCL0316

## (undated) DEVICE — TRAY CATH 16F URIN MTR LTX -- CONVERT TO ITEM 363111

## (undated) DEVICE — FLOSEAL MATRIX IS INDICATED IN SURGICAL PROCEDURES (OTHER THAN IN OPHTHALMIC) AS AN ADJUNCT TO HEMOSTASIS WHEN CONTROL OF BLEEDING BY LIGATURE OR CONVENTIONALPROCEDURES IS INEFFECTIVE OR IMPRACTICAL.: Brand: FLOSEAL HEMOSTATIC MATRIX

## (undated) DEVICE — BLADELESS OPTICAL TROCAR WITH FIXATION CANNULA: Brand: VERSAONE

## (undated) DEVICE — TIP COVER ACCESSORY

## (undated) DEVICE — ELECTRO LUBE IS A SINGLE PATIENT USE DEVICE THAT IS INTENDED TO BE USED ON ELECTROSURGICAL ELECTRODES TO REDUCE STICKING.: Brand: KEY SURGICAL ELECTRO LUBE

## (undated) DEVICE — DERMABOND SKIN ADH 0.7ML -- DERMABOND ADVANCED 12/BX

## (undated) DEVICE — BAG DRAIN URIN 2000ML LF STRL -- CONVERT TO ITEM 363123

## (undated) DEVICE — STERILE POLYISOPRENE POWDER-FREE SURGICAL GLOVES: Brand: PROTEXIS

## (undated) DEVICE — SHEET,DRAPE,70X100,STERILE: Brand: MEDLINE

## (undated) DEVICE — SOL INJ SOD CL 0.9% 1000ML BG --

## (undated) DEVICE — REM POLYHESIVE ADULT PATIENT RETURN ELECTRODE: Brand: VALLEYLAB

## (undated) DEVICE — SOL IRR NACL 0.9% 500ML POUR --

## (undated) DEVICE — SUT PROL 0 30IN CT1 BLU --

## (undated) DEVICE — Z DISCONTINUED USE 2639304 STRAP POS W3INXL30FT WIDE BK TO BK HK AND LOOP CLSR ALISTRP

## (undated) DEVICE — DRAPE SURG EQUIP W105XH13XL20IN 3 ARM DISPOSABLE DA VINCI S

## (undated) DEVICE — SYSTEM FASCIAL CLSR UNIQUE SHLDED WNG SAFE UNIF CONSISTENT